# Patient Record
Sex: FEMALE | Race: WHITE | Employment: UNEMPLOYED | URBAN - NONMETROPOLITAN AREA
[De-identification: names, ages, dates, MRNs, and addresses within clinical notes are randomized per-mention and may not be internally consistent; named-entity substitution may affect disease eponyms.]

---

## 2017-01-30 ENCOUNTER — OFFICE VISIT (OUTPATIENT)
Dept: FAMILY MEDICINE CLINIC | Age: 22
End: 2017-01-30
Payer: COMMERCIAL

## 2017-01-30 VITALS
WEIGHT: 91 LBS | BODY MASS INDEX: 18.35 KG/M2 | TEMPERATURE: 98 F | SYSTOLIC BLOOD PRESSURE: 98 MMHG | OXYGEN SATURATION: 96 % | HEIGHT: 59 IN | HEART RATE: 82 BPM | DIASTOLIC BLOOD PRESSURE: 64 MMHG | RESPIRATION RATE: 16 BRPM

## 2017-01-30 DIAGNOSIS — J01.01 ACUTE RECURRENT MAXILLARY SINUSITIS: ICD-10-CM

## 2017-01-30 DIAGNOSIS — J01.01 ACUTE RECURRENT MAXILLARY SINUSITIS: Primary | ICD-10-CM

## 2017-01-30 LAB
ANION GAP SERPL CALCULATED.3IONS-SCNC: 17 MMOL/L (ref 7–19)
BUN BLDV-MCNC: 7 MG/DL (ref 6–20)
CALCIUM SERPL-MCNC: 9.1 MG/DL (ref 8.6–10)
CHLORIDE BLD-SCNC: 98 MMOL/L (ref 98–111)
CO2: 25 MMOL/L (ref 22–29)
CREAT SERPL-MCNC: 0.5 MG/DL (ref 0.5–0.9)
GFR NON-AFRICAN AMERICAN: >60
GLUCOSE BLD-MCNC: 84 MG/DL (ref 74–109)
HCT VFR BLD CALC: 43.6 % (ref 37–47)
HEMOGLOBIN: 13.6 G/DL (ref 12–16)
MCH RBC QN AUTO: 29.2 PG (ref 27–31)
MCHC RBC AUTO-ENTMCNC: 31.2 G/DL (ref 33–37)
MCV RBC AUTO: 93.6 FL (ref 81–99)
PDW BLD-RTO: 14.1 % (ref 11.5–14.5)
PLATELET # BLD: 244 K/UL (ref 130–400)
PMV BLD AUTO: 10.7 FL (ref 7.4–10.4)
POTASSIUM SERPL-SCNC: 4.3 MMOL/L (ref 3.5–5)
RBC # BLD: 4.66 M/UL (ref 4.2–5.4)
SODIUM BLD-SCNC: 140 MMOL/L (ref 136–145)
WBC # BLD: 6.6 K/UL (ref 4.8–10.8)

## 2017-01-30 PROCEDURE — 96372 THER/PROPH/DIAG INJ SC/IM: CPT | Performed by: FAMILY MEDICINE

## 2017-01-30 PROCEDURE — 99214 OFFICE O/P EST MOD 30 MIN: CPT | Performed by: FAMILY MEDICINE

## 2017-01-30 RX ORDER — CEPHALEXIN 500 MG/1
500 CAPSULE ORAL 3 TIMES DAILY
Qty: 30 CAPSULE | Refills: 0 | Status: SHIPPED | OUTPATIENT
Start: 2017-01-30 | End: 2022-03-01 | Stop reason: CLARIF

## 2017-01-30 RX ORDER — TRIAMCINOLONE ACETONIDE 40 MG/ML
40 INJECTION, SUSPENSION INTRA-ARTICULAR; INTRAMUSCULAR ONCE
Status: COMPLETED | OUTPATIENT
Start: 2017-01-30 | End: 2017-01-30

## 2017-01-30 RX ORDER — IPRATROPIUM BROMIDE 42 UG/1
2 SPRAY, METERED NASAL 3 TIMES DAILY
Qty: 1 BOTTLE | Refills: 3 | Status: SHIPPED | OUTPATIENT
Start: 2017-01-30 | End: 2022-03-01 | Stop reason: CLARIF

## 2017-01-30 RX ORDER — HYDROXYZINE HYDROCHLORIDE 25 MG/1
12.5 TABLET, FILM COATED ORAL EVERY 8 HOURS PRN
COMMUNITY
End: 2022-03-01 | Stop reason: CLARIF

## 2017-01-30 RX ADMIN — TRIAMCINOLONE ACETONIDE 40 MG: 40 INJECTION, SUSPENSION INTRA-ARTICULAR; INTRAMUSCULAR at 17:16

## 2017-01-30 ASSESSMENT — ENCOUNTER SYMPTOMS
GASTROINTESTINAL NEGATIVE: 1
ALLERGIC/IMMUNOLOGIC NEGATIVE: 1
EYES NEGATIVE: 1
SORE THROAT: 1
COUGH: 1

## 2017-02-10 ENCOUNTER — ANTI-COAG VISIT (OUTPATIENT)
Dept: FAMILY MEDICINE CLINIC | Age: 22
End: 2017-02-10

## 2021-11-29 ENCOUNTER — OFFICE VISIT (OUTPATIENT)
Dept: FAMILY MEDICINE CLINIC | Age: 26
End: 2021-11-29
Payer: MEDICAID

## 2021-11-29 VITALS
HEART RATE: 120 BPM | WEIGHT: 109 LBS | HEIGHT: 61 IN | OXYGEN SATURATION: 99 % | BODY MASS INDEX: 20.58 KG/M2 | TEMPERATURE: 98.4 F

## 2021-11-29 DIAGNOSIS — J06.9 VIRAL URI: ICD-10-CM

## 2021-11-29 DIAGNOSIS — J06.9 VIRAL URI: Primary | ICD-10-CM

## 2021-11-29 PROCEDURE — 99213 OFFICE O/P EST LOW 20 MIN: CPT | Performed by: FAMILY MEDICINE

## 2021-11-29 RX ORDER — AZITHROMYCIN 250 MG/1
TABLET, FILM COATED ORAL
Qty: 6 TABLET | Refills: 0 | Status: SHIPPED | OUTPATIENT
Start: 2021-11-29 | End: 2022-03-01 | Stop reason: CLARIF

## 2021-11-29 ASSESSMENT — PATIENT HEALTH QUESTIONNAIRE - PHQ9
SUM OF ALL RESPONSES TO PHQ9 QUESTIONS 1 & 2: 0
SUM OF ALL RESPONSES TO PHQ QUESTIONS 1-9: 0
2. FEELING DOWN, DEPRESSED OR HOPELESS: 0
SUM OF ALL RESPONSES TO PHQ QUESTIONS 1-9: 0
SUM OF ALL RESPONSES TO PHQ QUESTIONS 1-9: 0
1. LITTLE INTEREST OR PLEASURE IN DOING THINGS: 0

## 2021-11-29 ASSESSMENT — ENCOUNTER SYMPTOMS
GASTROINTESTINAL NEGATIVE: 1
ALLERGIC/IMMUNOLOGIC NEGATIVE: 1
RESPIRATORY NEGATIVE: 1
EYES NEGATIVE: 1

## 2021-11-29 NOTE — PROGRESS NOTES
SUBJECTIVE:    Patient ID: Noemy Seen is a 27 y. o.female. HPI:   Patient here for evaluation of a upper respiratory infection  Patient is a 68-year-old white female. She complains of a couple day history of cough congestion associated with fevers and wheezes. No nausea no vomiting. Mom and dad are sick with the same thing according to her. Fevers and chills. No loss of smell or taste. Past Medical History:   Diagnosis Date    Allergic rhinitis       Current Outpatient Medications   Medication Sig Dispense Refill    azithromycin (ZITHROMAX) 250 MG tablet 2 pills day 1 then 1 pill daily for day 2-5 6 tablet 0    ondansetron (ZOFRAN-ODT) 4 MG disintegrating tablet Take 4 mg by mouth every 8 hours as needed for Nausea or Vomiting      pantoprazole (PROTONIX) 40 MG tablet Take 40 mg by mouth daily      hydrOXYzine (ATARAX) 25 MG tablet Take 12.5 mg by mouth every 8 hours as needed for Itching or Anxiety (Patient not taking: Reported on 11/29/2021)      cephALEXin (KEFLEX) 500 MG capsule Take 1 capsule by mouth 3 times daily (Patient not taking: Reported on 11/29/2021) 30 capsule 0    ipratropium (ATROVENT) 0.06 % nasal spray 2 sprays by Nasal route 3 times daily 1 Bottle 3    LO LOESTRIN FE 1 MG-10 MCG / 10 MCG tablet       escitalopram (LEXAPRO) 10 MG tablet Take 1 tablet by mouth daily (Patient not taking: Reported on 11/29/2021) 30 tablet 5    hyoscyamine (ANASPAZ;LEVSIN) 0.125 MG tablet Take 1 tablet by mouth every 4 hours as needed for Cramping (Patient not taking: Reported on 11/29/2021) 180 tablet 3    colestipol (COLESTID) 1 G tablet Take 1 tablet by mouth 4 times daily (before meals and nightly) (Patient not taking: Reported on 11/29/2021) 120 tablet 5     No current facility-administered medications for this visit.       Allergies   Allergen Reactions    Latex     Bentyl [Dicyclomine Hcl] Other (See Comments)     Tachycardia    Paxil [Paroxetine Hcl] Other (See Comments) Suicidal and worsen depression, work very on panic attacks    Desyrel [Trazodone] Nausea And Vomiting    Penicillins Rash    Zoloft [Sertraline Hcl] Nausea And Vomiting       Review of Systems   Constitutional: Positive for fever. HENT: Positive for congestion. Eyes: Negative. Respiratory: Negative. Cardiovascular: Negative. Gastrointestinal: Negative. Endocrine: Negative. Genitourinary: Negative. Musculoskeletal: Negative. Skin: Negative. Allergic/Immunologic: Negative. Neurological: Negative. Hematological: Negative. Psychiatric/Behavioral: Negative. OBJECTIVE:    Physical Exam  Constitutional:       Appearance: Normal appearance. She is well-developed and well-groomed. HENT:      Head: Normocephalic and atraumatic. Right Ear: Tympanic membrane, ear canal and external ear normal. There is no impacted cerumen. Left Ear: Tympanic membrane, ear canal and external ear normal. There is no impacted cerumen. Nose: Nose normal.      Mouth/Throat:      Lips: Pink. Mouth: Mucous membranes are moist.      Dentition: Normal dentition. Pharynx: Oropharynx is clear. Uvula midline. Eyes:      General: Lids are normal.         Right eye: No discharge. Left eye: No discharge. Extraocular Movements: Extraocular movements intact. Conjunctiva/sclera: Conjunctivae normal.      Right eye: Right conjunctiva is not injected. Left eye: Left conjunctiva is not injected. Pupils: Pupils are equal, round, and reactive to light. Neck:      Thyroid: No thyromegaly. Vascular: No carotid bruit or JVD. Cardiovascular:      Rate and Rhythm: Normal rate and regular rhythm. Pulses: Normal pulses. Carotid pulses are 2+ on the right side and 2+ on the left side. Radial pulses are 2+ on the right side and 2+ on the left side.       Heart sounds: Normal heart sounds, S1 normal and S2 normal. No murmur heard.      Pulmonary:      Effort: Pulmonary effort is normal.      Breath sounds: Normal breath sounds. Chest:   Breasts:      Right: No supraclavicular adenopathy. Left: No supraclavicular adenopathy. Abdominal:      General: Bowel sounds are normal. There is no distension or abdominal bruit. Palpations: Abdomen is soft. There is no mass. Hernia: No hernia is present. Musculoskeletal:         General: Normal range of motion. Cervical back: Full passive range of motion without pain, normal range of motion and neck supple. Right lower leg: No edema. Left lower leg: No edema. Lymphadenopathy:      Cervical: No cervical adenopathy. Right cervical: No superficial cervical adenopathy. Left cervical: No superficial cervical adenopathy. Upper Body:      Right upper body: No supraclavicular adenopathy. Left upper body: No supraclavicular adenopathy. Skin:     General: Skin is warm and dry. Coloration: Skin is not pale. Findings: No lesion or rash. Nails: There is no clubbing. Neurological:      Mental Status: She is alert and oriented to person, place, and time. Motor: No weakness or tremor. Coordination: Coordination normal.      Deep Tendon Reflexes: Reflexes are normal and symmetric. Psychiatric:         Attention and Perception: Attention normal.         Mood and Affect: Mood normal.         Speech: Speech normal.         Behavior: Behavior normal. Behavior is cooperative. Thought Content: Thought content normal.         Cognition and Memory: Cognition and memory normal.         Judgment: Judgment normal.        Pulse 120   Temp 98.4 °F (36.9 °C) (Temporal)   Ht 5' 1\" (1.549 m)   Wt 109 lb (49.4 kg)   SpO2 99%   BMI 20.60 kg/m²      ASSESSMENT:     Diagnosis Orders   1. Viral URI  Miscellaneous Sendout 1    azithromycin (ZITHROMAX) 250 MG tablet        PLAN:    1.  Z-Diogenes. Respiratory DNA.   Follow-up as needed

## 2021-11-30 LAB
ADENOVIRUS BY PCR: NOT DETECTED
BORDETELLA PARAPERTUSSIS BY PCR: NOT DETECTED
BORDETELLA PERTUSSIS BY PCR: NOT DETECTED
CHLAMYDOPHILIA PNEUMONIAE BY PCR: NOT DETECTED
CORONAVIRUS 229E BY PCR: NOT DETECTED
CORONAVIRUS HKU1 BY PCR: NOT DETECTED
CORONAVIRUS NL63 BY PCR: NOT DETECTED
CORONAVIRUS OC43 BY PCR: NOT DETECTED
HUMAN METAPNEUMOVIRUS BY PCR: DETECTED
HUMAN RHINOVIRUS/ENTEROVIRUS BY PCR: NOT DETECTED
INFLUENZA A BY PCR: NOT DETECTED
INFLUENZA B BY PCR: NOT DETECTED
MYCOPLASMA PNEUMONIAE BY PCR: NOT DETECTED
PARAINFLUENZA VIRUS 1 BY PCR: NOT DETECTED
PARAINFLUENZA VIRUS 2 BY PCR: NOT DETECTED
PARAINFLUENZA VIRUS 3 BY PCR: NOT DETECTED
PARAINFLUENZA VIRUS 4 BY PCR: NOT DETECTED
RESPIRATORY SYNCYTIAL VIRUS BY PCR: NOT DETECTED
SARS-COV-2, PCR: NOT DETECTED

## 2022-03-01 ENCOUNTER — OFFICE VISIT (OUTPATIENT)
Dept: FAMILY MEDICINE CLINIC | Age: 27
End: 2022-03-01
Payer: MEDICAID

## 2022-03-01 VITALS
OXYGEN SATURATION: 99 % | TEMPERATURE: 98.2 F | BODY MASS INDEX: 19.84 KG/M2 | DIASTOLIC BLOOD PRESSURE: 62 MMHG | HEART RATE: 109 BPM | WEIGHT: 105 LBS | SYSTOLIC BLOOD PRESSURE: 112 MMHG

## 2022-03-01 DIAGNOSIS — R00.0 TACHYCARDIA: ICD-10-CM

## 2022-03-01 DIAGNOSIS — G93.5 CHIARI I MALFORMATION (HCC): ICD-10-CM

## 2022-03-01 DIAGNOSIS — N30.00 ACUTE CYSTITIS WITHOUT HEMATURIA: Primary | ICD-10-CM

## 2022-03-01 DIAGNOSIS — K58.9 IRRITABLE BOWEL SYNDROME, UNSPECIFIED TYPE: ICD-10-CM

## 2022-03-01 LAB
ALBUMIN SERPL-MCNC: 5.1 G/DL (ref 3.5–5.2)
ALP BLD-CCNC: 62 U/L (ref 35–104)
ALT SERPL-CCNC: <5 U/L (ref 5–33)
ANION GAP SERPL CALCULATED.3IONS-SCNC: 16 MMOL/L (ref 7–19)
AST SERPL-CCNC: 14 U/L (ref 5–32)
BILIRUB SERPL-MCNC: 0.5 MG/DL (ref 0.2–1.2)
BILIRUBIN URINE: NEGATIVE
BLOOD, URINE: NEGATIVE
BUN BLDV-MCNC: 10 MG/DL (ref 6–20)
CALCIUM SERPL-MCNC: 9.6 MG/DL (ref 8.6–10)
CHLORIDE BLD-SCNC: 106 MMOL/L (ref 98–111)
CLARITY: CLEAR
CO2: 18 MMOL/L (ref 22–29)
COLOR: YELLOW
CREAT SERPL-MCNC: 0.6 MG/DL (ref 0.5–0.9)
GFR AFRICAN AMERICAN: >59
GFR NON-AFRICAN AMERICAN: >60
GLUCOSE BLD-MCNC: 95 MG/DL (ref 74–109)
GLUCOSE URINE: NEGATIVE MG/DL
HCT VFR BLD CALC: 45.9 % (ref 37–47)
HEMOGLOBIN: 14.3 G/DL (ref 12–16)
KETONES, URINE: NEGATIVE MG/DL
LEUKOCYTE ESTERASE, URINE: ABNORMAL
MCH RBC QN AUTO: 31.4 PG (ref 27–31)
MCHC RBC AUTO-ENTMCNC: 31.2 G/DL (ref 33–37)
MCV RBC AUTO: 100.7 FL (ref 81–99)
NITRITE, URINE: NEGATIVE
PDW BLD-RTO: 12.8 % (ref 11.5–14.5)
PH UA: 7.5 (ref 5–8)
PLATELET # BLD: 220 K/UL (ref 130–400)
PMV BLD AUTO: 10.1 FL (ref 9.4–12.3)
POTASSIUM SERPL-SCNC: 4.2 MMOL/L (ref 3.5–5)
PROTEIN UA: NEGATIVE MG/DL
RBC # BLD: 4.56 M/UL (ref 4.2–5.4)
SODIUM BLD-SCNC: 140 MMOL/L (ref 136–145)
SPECIFIC GRAVITY UA: 1.01 (ref 1–1.03)
TOTAL PROTEIN: 7.2 G/DL (ref 6.6–8.7)
TSH SERPL DL<=0.05 MIU/L-ACNC: 2.43 UIU/ML (ref 0.27–4.2)
URINE REFLEX TO CULTURE: YES
URINE TYPE: ABNORMAL
UROBILINOGEN, URINE: 0.2 E.U./DL
WBC # BLD: 6.7 K/UL (ref 4.8–10.8)

## 2022-03-01 PROCEDURE — 81003 URINALYSIS AUTO W/O SCOPE: CPT | Performed by: FAMILY MEDICINE

## 2022-03-01 PROCEDURE — 99214 OFFICE O/P EST MOD 30 MIN: CPT | Performed by: FAMILY MEDICINE

## 2022-03-01 RX ORDER — QUETIAPINE FUMARATE 100 MG/1
TABLET, FILM COATED ORAL
COMMUNITY
Start: 2021-12-22 | End: 2022-04-04

## 2022-03-01 RX ORDER — ALPRAZOLAM 1 MG/1
1 TABLET ORAL 3 TIMES DAILY PRN
COMMUNITY
Start: 2021-10-28

## 2022-03-01 RX ORDER — LEVOFLOXACIN 500 MG/1
500 TABLET, FILM COATED ORAL DAILY
Qty: 7 TABLET | Refills: 0 | Status: SHIPPED | OUTPATIENT
Start: 2022-03-01 | End: 2022-03-08

## 2022-03-01 ASSESSMENT — ENCOUNTER SYMPTOMS
RESPIRATORY NEGATIVE: 1
ALLERGIC/IMMUNOLOGIC NEGATIVE: 1
EYES NEGATIVE: 1
ABDOMINAL PAIN: 1

## 2022-03-01 NOTE — PROGRESS NOTES
SUBJECTIVE:    Patient ID: Madeline Hale is a 27 y. o.female. HPI:   Here for follow-up of multiple medical problems. Patient is a 79-year-old white female. She complains of a 2-week history of dysuria frequency with some mild lower back pain. No nausea no vomiting. No fevers no chills. She has history of UTIs. She also would like to be referred to a cardiologist.  She was seen in cardiologist for tachycardia in [facility]. She was given a beta-blocker that she was not able to tolerate. Etiology of her tachycardia was never determined. She also have IBS. She was seen the GI doctor in [place]. She needs to be referral to GI locally. She also have a mild Chiari malformation. She was seen neurosurgery in [place]. Past Medical History:   Diagnosis Date    Allergic rhinitis       Current Outpatient Medications   Medication Sig Dispense Refill    QUEtiapine (SEROQUEL) 100 MG tablet       ALPRAZolam (XANAX) 1 MG tablet Take 1 mg by mouth 3 times daily as needed.  levoFLOXacin (LEVAQUIN) 500 MG tablet Take 1 tablet by mouth daily for 7 days 7 tablet 0     No current facility-administered medications for this visit. Allergies   Allergen Reactions    Latex     Bentyl [Dicyclomine Hcl] Other (See Comments)     Tachycardia    Paxil [Paroxetine Hcl] Other (See Comments)     Suicidal and worsen depression, work very on panic attacks    Desyrel [Trazodone] Nausea And Vomiting    Penicillins Rash    Zoloft [Sertraline Hcl] Nausea And Vomiting       Review of Systems   Constitutional: Negative. HENT: Negative. Eyes: Negative. Respiratory: Negative. Cardiovascular: Positive for palpitations. Gastrointestinal: Positive for abdominal pain. Endocrine: Negative. Genitourinary: Positive for dysuria. Musculoskeletal: Negative. Skin: Negative. Allergic/Immunologic: Negative. Neurological: Negative. Hematological: Negative.     Psychiatric/Behavioral: Negative. OBJECTIVE:    Physical Exam   /62 (Site: Left Upper Arm, Position: Sitting, Cuff Size: Medium Adult)   Pulse 109   Temp 98.2 °F (36.8 °C) (Temporal)   Wt 105 lb (47.6 kg)   SpO2 99%   BMI 19.84 kg/m²      ASSESSMENT:     Diagnosis Orders   1. Acute cystitis without hematuria  Urinalysis with Reflex to Culture    levoFLOXacin (LEVAQUIN) 500 MG tablet   2. Tachycardia-etiology unknown Alyssa Jacobs MD, Cardiology, [place]    CBC    Comprehensive Metabolic Panel    TSH   3. Irritable bowel syndrome, unspecified type-ongoing Fernanda Morrison MD, Gastroenterology, [place]   4. Chiari I malformation (HCC)-needs follow-up Paulina Lopez DO, Neurosurgery, [place]        PLAN:    1. Urine with reflex to culture. Trial of Levaquin. 2.  Refer to cardiology. Blood work. Zio patch  3.  Refer to GI  4.   Refer to neurosurgery  Follow-up after seen by specialist.

## 2022-03-02 LAB
BACTERIA: NEGATIVE /HPF
CRYSTALS, UA: ABNORMAL /HPF
EPITHELIAL CELLS, UA: 2 /HPF (ref 0–5)
HYALINE CASTS: 0 /HPF (ref 0–8)
RBC UA: 0 /HPF (ref 0–4)
WBC UA: 6 /HPF (ref 0–5)

## 2022-03-04 ENCOUNTER — TELEPHONE (OUTPATIENT)
Dept: NEUROSURGERY | Age: 27
End: 2022-03-04

## 2022-03-04 LAB — URINE CULTURE, ROUTINE: NORMAL

## 2022-03-04 NOTE — TELEPHONE ENCOUNTER
1st attempt to call patient to schedule appointment, left voicemail with call back number [phone]

## 2022-03-07 ENCOUNTER — TELEPHONE (OUTPATIENT)
Dept: NEUROSURGERY | Age: 27
End: 2022-03-07

## 2022-03-07 NOTE — TELEPHONE ENCOUNTER
2nd attempt to call patient to schedule appointment left voicemail with call back number [phone]

## 2022-03-08 DIAGNOSIS — N30.00 ACUTE CYSTITIS WITHOUT HEMATURIA: Primary | ICD-10-CM

## 2022-03-10 ENCOUNTER — TELEPHONE (OUTPATIENT)
Dept: NEUROSURGERY | Age: 27
End: 2022-03-10

## 2022-03-10 NOTE — TELEPHONE ENCOUNTER
3rd attempt to call patient to schedule appointment, left voicemail with call back number [phone]

## 2022-03-14 ENCOUNTER — HOSPITAL ENCOUNTER (OUTPATIENT)
Dept: NON INVASIVE DIAGNOSTICS | Age: 27
Discharge: HOME | End: 2022-03-14
Payer: MEDICAID

## 2022-03-14 DIAGNOSIS — R00.0 TACHYCARDIA: ICD-10-CM

## 2022-03-14 PROCEDURE — 93246 EXT ECG>7D<15D RECORDING: CPT

## 2022-03-28 ENCOUNTER — OFFICE VISIT (OUTPATIENT)
Dept: FAMILY MEDICINE CLINIC | Age: 27
End: 2022-03-28
Payer: MEDICAID

## 2022-03-28 ENCOUNTER — NURSE TRIAGE (OUTPATIENT)
Dept: OTHER | Facility: CLINIC | Age: 27
End: 2022-03-28

## 2022-03-28 VITALS
TEMPERATURE: 98.1 F | OXYGEN SATURATION: 99 % | DIASTOLIC BLOOD PRESSURE: 64 MMHG | HEART RATE: 116 BPM | RESPIRATION RATE: 20 BRPM | SYSTOLIC BLOOD PRESSURE: 100 MMHG | WEIGHT: 101 LBS | BODY MASS INDEX: 19.08 KG/M2

## 2022-03-28 DIAGNOSIS — R53.83 OTHER FATIGUE: ICD-10-CM

## 2022-03-28 DIAGNOSIS — R74.8 ABNORMAL AST AND ALT: Primary | ICD-10-CM

## 2022-03-28 DIAGNOSIS — R79.89 ABNORMAL CBC: ICD-10-CM

## 2022-03-28 DIAGNOSIS — R74.8 ABNORMAL AST AND ALT: ICD-10-CM

## 2022-03-28 LAB
ALBUMIN SERPL-MCNC: 4.9 G/DL (ref 3.5–5.2)
ALP BLD-CCNC: 65 U/L (ref 35–104)
ALT SERPL-CCNC: <5 U/L (ref 5–33)
AST SERPL-CCNC: 11 U/L (ref 5–32)
BASOPHILS ABSOLUTE: 0 K/UL (ref 0–0.2)
BASOPHILS RELATIVE PERCENT: 0.5 % (ref 0–1)
BILIRUB SERPL-MCNC: 0.4 MG/DL (ref 0.2–1.2)
BILIRUBIN DIRECT: 0.1 MG/DL (ref 0–0.3)
BILIRUBIN, INDIRECT: 0.3 MG/DL (ref 0.1–1)
C-REACTIVE PROTEIN: <0.3 MG/DL (ref 0–0.5)
EOSINOPHILS ABSOLUTE: 0 K/UL (ref 0–0.6)
EOSINOPHILS RELATIVE PERCENT: 0.7 % (ref 0–5)
FERRITIN: 46.9 NG/ML (ref 13–150)
HCT VFR BLD CALC: 45 % (ref 37–47)
HEMOGLOBIN: 14.2 G/DL (ref 12–16)
IMMATURE GRANULOCYTES #: 0 K/UL
IRON SATURATION: 20 % (ref 14–50)
IRON: 60 UG/DL (ref 37–145)
LYMPHOCYTES ABSOLUTE: 2.1 K/UL (ref 1.1–4.5)
LYMPHOCYTES RELATIVE PERCENT: 37.4 % (ref 20–40)
MCH RBC QN AUTO: 31 PG (ref 27–31)
MCHC RBC AUTO-ENTMCNC: 31.6 G/DL (ref 33–37)
MCV RBC AUTO: 98.3 FL (ref 81–99)
MONOCYTES ABSOLUTE: 0.5 K/UL (ref 0–0.9)
MONOCYTES RELATIVE PERCENT: 9 % (ref 0–10)
NEUTROPHILS ABSOLUTE: 2.9 K/UL (ref 1.5–7.5)
NEUTROPHILS RELATIVE PERCENT: 52 % (ref 50–65)
PDW BLD-RTO: 12.8 % (ref 11.5–14.5)
PLATELET # BLD: 292 K/UL (ref 130–400)
PMV BLD AUTO: 10 FL (ref 9.4–12.3)
RBC # BLD: 4.58 M/UL (ref 4.2–5.4)
SEDIMENTATION RATE, ERYTHROCYTE: 6 MM/HR (ref 0–20)
TOTAL IRON BINDING CAPACITY: 295 UG/DL (ref 250–400)
TOTAL PROTEIN: 7 G/DL (ref 6.6–8.7)
WBC # BLD: 5.6 K/UL (ref 4.8–10.8)

## 2022-03-28 PROCEDURE — 99214 OFFICE O/P EST MOD 30 MIN: CPT | Performed by: NURSE PRACTITIONER

## 2022-03-28 SDOH — ECONOMIC STABILITY: FOOD INSECURITY: WITHIN THE PAST 12 MONTHS, THE FOOD YOU BOUGHT JUST DIDN'T LAST AND YOU DIDN'T HAVE MONEY TO GET MORE.: NEVER TRUE

## 2022-03-28 SDOH — ECONOMIC STABILITY: FOOD INSECURITY: WITHIN THE PAST 12 MONTHS, YOU WORRIED THAT YOUR FOOD WOULD RUN OUT BEFORE YOU GOT MONEY TO BUY MORE.: NEVER TRUE

## 2022-03-28 ASSESSMENT — ENCOUNTER SYMPTOMS
BLOOD IN STOOL: 1
SHORTNESS OF BREATH: 0
COUGH: 0
NAUSEA: 1
VOMITING: 0
SORE THROAT: 0

## 2022-03-28 ASSESSMENT — SOCIAL DETERMINANTS OF HEALTH (SDOH): HOW HARD IS IT FOR YOU TO PAY FOR THE VERY BASICS LIKE FOOD, HOUSING, MEDICAL CARE, AND HEATING?: NOT HARD AT ALL

## 2022-03-28 NOTE — TELEPHONE ENCOUNTER
Received call from [facility] with Red Flag Complaint. Subjective: Caller states \"I've had a migraine for a few days. I have really bad body pain. \"     Hx of Migraines-\"body pain is not this bad usually\"    Current Symptoms: headache, extreme fatigue, body aches, slight nausea    Onset: a few days ago; worsening    Associated Symptoms: reduced activity    Pain Severity: 7/10 body aches, 6/10 headache; aching; constant    Temperature: Denies feeling feverish    What has been tried: Tramadol, Tylenol-NOT helping    LMP: IUD Pregnant: No    Recommended disposition: Go to Office Now. Writer advised patient to be seen at [facility] if unable to get appointment in office in the next four hours. Patient agreeable. Care advice provided, patient verbalizes understanding; denies any other questions or concerns; instructed to call back for any new or worsening symptoms. Patient/Caller agrees with recommended disposition; writer provided warm transfer to [place] [facility] [employer] for appointment scheduling. Attention Provider: Thank you for allowing me to participate in the care of your patient. The patient was connected to triage in response to information provided to the [facility]. Please do not respond through this encounter as the response is not directed to a shared pool.       Reason for Disposition   SEVERE pain (e.g., excruciating, unable to do any normal activities) and not improved 2 hours after pain medicine    Protocols used: MUSCLE ACHES AND BODY PAIN-ADULT-[place]

## 2022-03-28 NOTE — PROGRESS NOTES
SUBJECTIVE:  Headache , Fatigue and body aches   Patient ID: Erlin Griffin is a 27 y.o. female. HPI:   HPI   So reviewed with mother number to call for Neuro. She has correct number. Started a few days ago. Felt tired ,   Has had diarrhea for 2 months. Goes 30 minutes after eating. 3-4 times a day. Doesn't wake in the middle of the night. States bleeding from rectum. Has an appointment with GI 15 th of April   It is from fissures. No hard Bm. Has hemorrhoids. Will alternate between improving and getting bad. Red in color no clots. No fever. Has some stomach pain. Sometimes stabbing and sometimes achy. With Nausea. Drink ETOH Wine once a month. Eating only 1-2 meals. Fatigue for 3 days. sleeping all the time. No sore throat. Works at [employer]. Past Medical History:   Diagnosis Date    Allergic rhinitis       Prior to Visit Medications    Medication Sig Taking? Authorizing Provider   QUEtiapine (SEROQUEL) 100 MG tablet  Yes Historical Provider, MD   ALPRAZolam (XANAX) 1 MG tablet Take 1 mg by mouth 3 times daily as needed. Yes Historical Provider, MD      Allergies   Allergen Reactions    Latex     Bentyl [Dicyclomine Hcl] Other (See Comments)     Tachycardia    Paxil [Paroxetine Hcl] Other (See Comments)     Suicidal and worsen depression, work very on panic attacks    Desyrel [Trazodone] Nausea And Vomiting    Penicillins Rash    Zoloft [Sertraline Hcl] Nausea And Vomiting       Review of Systems   Constitutional: Positive for fatigue. Negative for fever. HENT: Negative for congestion and sore throat. Respiratory: Negative for cough and shortness of breath. Cardiovascular: Negative for chest pain and leg swelling. Gastrointestinal: Positive for blood in stool and nausea. Negative for vomiting. Genitourinary: Negative for difficulty urinating, hematuria and menstrual problem. Vaginal discharge: all over    Musculoskeletal: Positive for arthralgias and myalgias. Neurological: Positive for dizziness and headaches. Psychiatric/Behavioral: Positive for sleep disturbance. Negative for confusion and dysphoric mood. The patient is not nervous/anxious. OBJECTIVE:    Physical Exam  Constitutional:       Appearance: Normal appearance. She is well-developed and well-groomed. HENT:      Head: Normocephalic and atraumatic. Right Ear: Tympanic membrane, ear canal and external ear normal. There is no impacted cerumen. Left Ear: Tympanic membrane, ear canal and external ear normal. There is no impacted cerumen. Nose: Nose normal.      Mouth/Throat:      Lips: Pink. Mouth: Mucous membranes are moist.      Dentition: Normal dentition. Pharynx: Oropharynx is clear. Uvula midline. Eyes:      General: Lids are normal.         Right eye: No discharge. Left eye: No discharge. Extraocular Movements: Extraocular movements intact. Conjunctiva/sclera: Conjunctivae normal.      Right eye: Right conjunctiva is not injected. Left eye: Left conjunctiva is not injected. Pupils: Pupils are equal, round, and reactive to light. Neck:      Thyroid: No thyromegaly. Vascular: No carotid bruit or JVD. Cardiovascular:      Rate and Rhythm: Normal rate and regular rhythm. Pulses: Normal pulses. Carotid pulses are 2+ on the right side and 2+ on the left side. Radial pulses are 2+ on the right side and 2+ on the left side. Heart sounds: Normal heart sounds, S1 normal and S2 normal. No murmur heard. Pulmonary:      Effort: Pulmonary effort is normal.      Breath sounds: Normal breath sounds. Chest:   Breasts:      Right: No supraclavicular adenopathy. Left: No supraclavicular adenopathy. Abdominal:      General: Bowel sounds are normal. There is no distension or abdominal bruit. Palpations: Abdomen is soft. There is no mass. Hernia: No hernia is present.    Musculoskeletal: General: Normal range of motion. Cervical back: Full passive range of motion without pain, normal range of motion and neck supple. Right lower leg: No edema. Left lower leg: No edema. Lymphadenopathy:      Cervical: No cervical adenopathy. Right cervical: No superficial cervical adenopathy. Left cervical: No superficial cervical adenopathy. Upper Body:      Right upper body: No supraclavicular adenopathy. Left upper body: No supraclavicular adenopathy. Skin:     General: Skin is warm and dry. Coloration: Skin is not pale. Findings: No lesion or rash. Nails: There is no clubbing. Neurological:      Mental Status: She is alert and oriented to person, place, and time. Motor: No weakness or tremor. Coordination: Coordination normal.      Deep Tendon Reflexes: Reflexes are normal and symmetric. Psychiatric:         Attention and Perception: Attention normal.         Mood and Affect: Mood normal.         Speech: Speech normal.         Behavior: Behavior normal. Behavior is cooperative. Thought Content: Thought content normal.         Cognition and Memory: Cognition and memory normal.         Judgment: Judgment normal.        /64 (Site: Left Upper Arm, Position: Sitting, Cuff Size: Medium Adult)   Pulse 116   Temp 98.1 °F (36.7 °C) (Temporal)   Resp 20   Wt 101 lb (45.8 kg)   SpO2 99%   BMI 19.08 kg/m²      ASSESSMENT:      ICD-10-CM    1. Abnormal AST and ALT  R74.8 Hepatic Function Panel   2. Abnormal CBC  R79.89 Ferritin     Iron and TIBC     CBC with Auto Differential   3.  Other fatigue  R53.83 Sedimentation Rate     C-Reactive Protein     SHANDA Screen with Reflex       PLAN:    Tennie Showers: Migraine (has had a migraine with body aches and fatigue for the last few days ), Fatigue (fatigue ), and Generalized Body Aches (has taken Tramadol, Tylenol and Gar Patricia nothing is helping )  Has an appointment with GI in April   Mother Has called Neurology back no answer left message   Will see cardiology     Diagnosis and orders for this visit are above.

## 2022-03-28 NOTE — LETTER
[facility]  [address] [address]  Phone: [phone]  Fax: [phone]    GENARO Saini        March 28, 2022     Patient: Jesus Peterson   YOB: 1995   Date of Visit: 3/28/2022       To Whom It May Concern: It is my medical opinion that Jesus Peterson may return to work on 3/29/2022. If you have any questions or concerns, please don't hesitate to call.     Sincerely,        GENARO Saini

## 2022-03-29 ENCOUNTER — TELEPHONE (OUTPATIENT)
Dept: NEUROSURGERY | Age: 27
End: 2022-03-29

## 2022-03-29 ENCOUNTER — TELEPHONE (OUTPATIENT)
Dept: NEUROLOGY | Age: 27
End: 2022-03-29

## 2022-03-29 NOTE — TELEPHONE ENCOUNTER
Spoke with Blossom Stout at [facility] to request MRI Brain Imaging, and was informed to fax a medical records request to [phone]. I faxed request to obtain MRI Brain from 4/13/2021 and to also obtain X-ray Lumbar Spine and X-ray Thoracic (10-26-21).

## 2022-03-29 NOTE — TELEPHONE ENCOUNTER
[facility] New Patient Questionnaire    1. Diagnosis/Reason for Referral?    Chiari malformation     2. Who is completing questionnaire? Patient X Caregiver Family      3. Has the patient had any previous spinal/brain surgeries? NO        A. If yes, what is the name of the facility in which the surgery was performed? B. Procedure/Surgery performed? C. Who was the surgeon? D. When was the surgery? MM/YY       E. Did the patient improve after the surgery? 4. Is this a second opinion? If yes, Dr. Heather Hooks would like to review patient first before making the appointment. 5. Have MRI Images been obtain within the last year? Yes  No X      XR  CT     If yes, where was the imaging performed? If yes, what part of the body? Lumbar  Cervical  Thoracic  Brain     If yes, when was it obtained? MM/YY    Note: if the scan was performed at a facility other than [facility], the disc will need to be brought to the appointment or we need to reach out to obtain the disc. A. Was the patient instructed to provide the disc? Yes   No X      8. Has the patient had a NCV/EMG within the last year? Yes  No X     If yes, where was it performed and date? MM/YY  Location:      9. Has the patient been to Physical Therapy? Yes  No X     If yes, what location, how long attended, and last visit? Location:        Therapy Lasted:    Date of Last Visit:      10. Has the patient been to Pain Management? Yes  No X     If yes, what location and last visit     Location:   Last Visit:   Is it helping?

## 2022-03-31 LAB — ANA IGG, ELISA: NORMAL

## 2022-04-04 ENCOUNTER — OFFICE VISIT (OUTPATIENT)
Dept: UROLOGY | Age: 27
End: 2022-04-04
Payer: MEDICAID

## 2022-04-04 VITALS — WEIGHT: 103.4 LBS | BODY MASS INDEX: 19.52 KG/M2 | HEIGHT: 61 IN | TEMPERATURE: 98.1 F

## 2022-04-04 DIAGNOSIS — Z87.442 HISTORY OF KIDNEY STONES: ICD-10-CM

## 2022-04-04 DIAGNOSIS — R30.0 DYSURIA: ICD-10-CM

## 2022-04-04 DIAGNOSIS — N39.0 RECURRENT UTI: Primary | ICD-10-CM

## 2022-04-04 PROCEDURE — 99204 OFFICE O/P NEW MOD 45 MIN: CPT | Performed by: NURSE PRACTITIONER

## 2022-04-04 PROCEDURE — 51798 US URINE CAPACITY MEASURE: CPT | Performed by: NURSE PRACTITIONER

## 2022-04-04 RX ORDER — PANTOPRAZOLE SODIUM 40 MG/1
40 TABLET, DELAYED RELEASE ORAL DAILY
COMMUNITY
End: 2022-08-26 | Stop reason: SDUPTHER

## 2022-04-04 RX ORDER — AMITRIPTYLINE HYDROCHLORIDE 10 MG/1
10 TABLET, FILM COATED ORAL NIGHTLY
Qty: 14 TABLET | Refills: 0 | Status: SHIPPED | OUTPATIENT
Start: 2022-04-04 | End: 2022-05-04 | Stop reason: ALTCHOICE

## 2022-04-04 RX ORDER — TRAMADOL HYDROCHLORIDE 50 MG/1
50 TABLET ORAL EVERY 6 HOURS PRN
COMMUNITY
Start: 2022-01-07 | End: 2022-08-26 | Stop reason: SDUPTHER

## 2022-04-04 RX ORDER — TOPIRAMATE 50 MG/1
TABLET, FILM COATED ORAL
COMMUNITY
Start: 2022-01-07

## 2022-04-04 RX ORDER — PHENAZOPYRIDINE HYDROCHLORIDE 100 MG/1
100 TABLET, FILM COATED ORAL 3 TIMES DAILY PRN
Qty: 30 TABLET | Refills: 0 | Status: SHIPPED | OUTPATIENT
Start: 2022-04-04 | End: 2022-05-04 | Stop reason: ALTCHOICE

## 2022-04-04 RX ORDER — AMITRIPTYLINE HYDROCHLORIDE 25 MG/1
25 TABLET, FILM COATED ORAL NIGHTLY
Qty: 30 TABLET | Refills: 0 | Status: SHIPPED | OUTPATIENT
Start: 2022-04-04 | End: 2022-08-26

## 2022-04-04 RX ORDER — QUETIAPINE FUMARATE 25 MG/1
25 TABLET, FILM COATED ORAL NIGHTLY
COMMUNITY
Start: 2022-03-01

## 2022-04-04 NOTE — PROGRESS NOTES
Sebastien Santiago is a 27 y.o., female, New patient who presents today   Chief Complaint   Patient presents with    New Patient     I am here today for recurrent UTI        HPI   Patient presents with complaints of lower urinary tract symptoms including dysuria, pain in her lower abdomen and vaginal area, lower back pain. She reports these have been recurring intermittently since she was 17years old. She believes these are secondary to recurrent urinary tract infections. She denies any fevers, but sometimes can experience some chills and nausea if her symptoms are severe enough. She has recently moved here and her previous primary care provider only sent 1+ culture for review. This was in February 2020 and grew out Enterococcus. She reports over the past few years, chronically she has been experiencing some urgency without frequency. She denies any trouble emptying her bladder. She does note some triggers for her symptoms including tampons, intercourse, after her period. She also notes dietary triggers such as soda and sugar. She has utilized Azo in the past which has been helpful for her symptoms. She reports recently having her first stone episode about 2 to 3 weeks ago. She was able to pass this on her own. She was evaluated at that time for her urinary symptoms, however, her culture was negative as her symptoms were likely secondary to the stone passage. She is on Topamax and has been on the medication for about 8 years. REVIEW OF SYSTEMS:  Review of Systems   Constitutional: Negative for chills and fever. Gastrointestinal: Positive for abdominal pain (Suprapubic). Negative for abdominal distention, nausea and vomiting. Genitourinary: Positive for dysuria and urgency. Negative for difficulty urinating, flank pain, frequency and hematuria. Musculoskeletal: Negative for back pain and gait problem. Psychiatric/Behavioral: Negative for agitation and confusion.         PHYSICAL EXAM:  Temp 98.1 °F (36.7 °C) (Temporal)   Ht 5' 1\" (1.549 m)   Wt 103 lb 6.4 oz (46.9 kg)   BMI 19.54 kg/m²   Physical Exam  Vitals and nursing note reviewed. Constitutional:       General: She is not in acute distress. Appearance: Normal appearance. She is not ill-appearing. Pulmonary:      Effort: Pulmonary effort is normal. No respiratory distress. Abdominal:      General: There is no distension. Tenderness: There is no abdominal tenderness. There is no right CVA tenderness or left CVA tenderness. Neurological:      Mental Status: She is alert and oriented to person, place, and time. Mental status is at baseline. Psychiatric:         Mood and Affect: Mood normal.         Behavior: Behavior normal.         ASSESSMENT/PLAN  1. Recurrent UTI  Patient presents with complaints of recurrent urinary tract infections, however, I am uncertain these are actually secondary to bacterial infection versus chronic cystitis or interstitial cystitis. The patient, her mother, and I discussed the difference between these conditions and the treatment moving forward. She has elected to move forward with Elavil as well as as needed Pyridium. We will reevaluate in a few weeks and if she is doing well on the current dosage, we will try to increased to 50 mg. If this is not helpful, we can look into other therapies. - [place] Measure, post-void residual, US, non-imaging  - amitriptyline (ELAVIL) 10 MG tablet; Take 1 tablet by mouth nightly  Dispense: 14 tablet; Refill: 0  - amitriptyline (ELAVIL) 25 MG tablet; Take 1 tablet by mouth nightly  Dispense: 30 tablet; Refill: 0  - phenazopyridine (PYRIDIUM) 100 MG tablet; Take 1 tablet by mouth 3 times daily as needed for Pain  Dispense: 30 tablet; Refill: [facility], [place]    2. Dysuria  Not likely secondary to bacterial source. Will treat as of interstitial cystitis. - AZ Measure, post-void residual, US, non-imaging  - amitriptyline (ELAVIL) 10 MG tablet;  Take 1 tablet by mouth nightly  Dispense: 14 tablet; Refill: 0  - phenazopyridine (PYRIDIUM) 100 MG tablet; Take 1 tablet by mouth 3 times daily as needed for Pain  Dispense: 30 tablet; Refill: 0    3. History of kidney stones  Recent history of stones. No imaging. Will evaluate with KUB at next appointment. - XR ABDOMEN (KUB) (SINGLE AP VIEW); Future      Orders Placed This Encounter   Procedures    XR ABDOMEN (KUB) (SINGLE AP VIEW)     Standing Status:   Future     Standing Expiration Date:   4/4/2023   [facility], [place]     Referral Priority:   Routine     Referral Type:   Eval and Treat     Referral Reason:   Specialty Services Required     Requested Specialty:   Obstetrics & Gynecology     Number of Visits Requested:   1    [place] Measure, post-void residual, US, non-imaging        Return in about 6 weeks (around 5/16/2022) for KUB prior. An electronic signature was used to authenticate this note. TYESHA LADD, APRN - CNP    All information inputted into the note by the MA to include chief complaint, past medical history, past surgical history, medications, allergies, social and family history and review of systems has been reviewed and updated as needed by me. EMR Dragon/transcription disclaimer: Much of this document is electronic transcription/translation of spoken language to printed text. The electronic translation of spoken language may be erroneous or, at times, nonsensical words or phrases may be inadvertently transcribed.  Although I have reviewed the document for such errors, some may still exist.

## 2022-04-05 ASSESSMENT — ENCOUNTER SYMPTOMS
BACK PAIN: 0
ABDOMINAL DISTENTION: 0
VOMITING: 0
NAUSEA: 0
ABDOMINAL PAIN: 1

## 2022-04-06 PROCEDURE — 93248 EXT ECG>7D<15D REV&INTERPJ: CPT | Performed by: INTERNAL MEDICINE

## 2022-04-06 NOTE — PROCEDURES
Cardiac event monitor report    Dates of recording 3/14/2022 through 3/26/2022    Summary impressions:    1. Sinus rhythm minimal heart rate 45 average 89 maximal 187    2. No episodes of ventricular tachycardia were recorded    3. No pauses greater than 3.0 seconds were recorded    4. No episodes of complete or Mobitz 2 atrioventricular block were recorded; rare Mobitz type I second-degree atrioventricular block was recorded. 5. No episodes of atrial fibrillation were recorded    6. No episodes of supraventricular tachycardia were recorded    7. 5 triggered events 0 diary events rhythm during those recording sinus rhythm    8.  Rare ectopy otherwise noted

## 2022-04-25 ENCOUNTER — TELEPHONE (OUTPATIENT)
Dept: UROLOGY | Age: 27
End: 2022-04-25

## 2022-04-25 NOTE — TELEPHONE ENCOUNTER
Pt. Called and states that amitripyline needs to be added to list of medicine she can not take. States it gives her bad side effects. Needing to have medicine changed. Please advise pt.

## 2022-05-04 ENCOUNTER — OFFICE VISIT (OUTPATIENT)
Dept: GASTROENTEROLOGY | Age: 27
End: 2022-05-04
Payer: MEDICAID

## 2022-05-04 VITALS
OXYGEN SATURATION: 99 % | BODY MASS INDEX: 19.26 KG/M2 | WEIGHT: 102 LBS | HEIGHT: 61 IN | HEART RATE: 83 BPM | DIASTOLIC BLOOD PRESSURE: 65 MMHG | SYSTOLIC BLOOD PRESSURE: 107 MMHG

## 2022-05-04 DIAGNOSIS — K52.9 CHRONIC DIARRHEA: Primary | ICD-10-CM

## 2022-05-04 DIAGNOSIS — R11.10 CHRONIC VOMITING: ICD-10-CM

## 2022-05-04 DIAGNOSIS — R11.0 CHRONIC NAUSEA: ICD-10-CM

## 2022-05-04 DIAGNOSIS — R10.84 GENERALIZED ABDOMINAL CRAMPING: ICD-10-CM

## 2022-05-04 DIAGNOSIS — G89.29 CHRONIC EPIGASTRIC PAIN: ICD-10-CM

## 2022-05-04 DIAGNOSIS — R10.13 CHRONIC EPIGASTRIC PAIN: ICD-10-CM

## 2022-05-04 PROCEDURE — 99204 OFFICE O/P NEW MOD 45 MIN: CPT | Performed by: NURSE PRACTITIONER

## 2022-05-04 ASSESSMENT — ENCOUNTER SYMPTOMS
CONSTIPATION: 0
DIARRHEA: 1
COUGH: 0
BLOOD IN STOOL: 0
RECTAL PAIN: 0
TROUBLE SWALLOWING: 0
SHORTNESS OF BREATH: 0
ABDOMINAL PAIN: 1
NAUSEA: 1
ABDOMINAL DISTENTION: 0
ANAL BLEEDING: 0
SORE THROAT: 1
VOMITING: 1
BACK PAIN: 1
VOICE CHANGE: 0

## 2022-05-04 NOTE — PROGRESS NOTES
Subjective:      Salvador Mercado is a23 y.o. female  Chief Complaint   Patient presents with    New Patient    Diarrhea       HPI  PCP: Mo Saini MD  Referring Provider: Ann Choe MD  New pt referral  From PCP  Pt reports the following GI complaints    Reports diarrhea  Started when she was 17years old  Used to alternate with soft formed stools but over the past 6 months its been diarrhea only  Has 5-6 diarrhea stools daily  Has generalized cramping all the time  But has sharp pains \"like knives\" that occur sometimes  And has nausea and vomiting with this. No blood in stool    Reports JAMAR pain  'Sensitive to touch\"  Chronic since childhood  Currently on oprotonix daily, this helps her heartburn but has never helped the JAMAR pain. Is s/p waqas      Family HX:    Pt denies family hx of colon polyps, colon CA, inflammatory bowel dx, gastric CA and esophageal CA.     Past Medical History:   Diagnosis Date    Allergic rhinitis           Past Surgical History:   Procedure Laterality Date    COLONOSCOPY  03/15/2012    Dr Alexandru Vergara  02/23/2014    Dr Arreola Done colitis (stool samples sent), cipro/flagyl    TONSILLECTOMY      TYMPANOSTOMY TUBE PLACEMENT  age 5   [facility] UPPER GASTROINTESTINAL ENDOSCOPY  05/11/2015    Dr Nik Camp reflux    UPPER GASTROINTESTINAL ENDOSCOPY  03/15/2012    Dr Ashli Carl (-)       Social History     Socioeconomic History    Marital status: Single     Spouse name: None    Number of children: None    Years of education: None    Highest education level: None   Occupational History    None   Tobacco Use    Smoking status: Never Smoker    Smokeless tobacco: Never Used   Vaping Use    Vaping Use: Never used   Substance and Sexual Activity    Alcohol use: Not Currently     Comment: maybe 2-3x a yr    Drug use: No    Sexual activity: None   Other Topics Concern    None   Social History Narrative    None     Social Determinants of Health     Financial Resource Strain: Low Risk     Difficulty of Paying Living Expenses: Not hard at all   Food Insecurity: No Food Insecurity    Worried About Running Out of Food in the Last Year: Never true    Debbie of Food in the Last Year: Never true   Transportation Needs:     Lack of Transportation (Medical): Not on file    Lack of Transportation (Non-Medical):  Not on file   Physical Activity:     Days of Exercise per Week: Not on file    Minutes of Exercise per Session: Not on file   Stress:     Feeling of Stress : Not on file   Social Connections:     Frequency of Communication with Friends and Family: Not on file    Frequency of Social Gatherings with Friends and Family: Not on file    Attends [employer] Services: Not on file    Active Member of [address] [place] [address] [employer] or Organizations: Not on file    Attends Club or Organization Meetings: Not on file    Marital Status: Not on file   Intimate Partner Violence:     Fear of Current or Ex-Partner: Not on file    Emotionally Abused: Not on file    Physically Abused: Not on file    Sexually Abused: Not on file   Housing Stability:     Unable to Pay for Housing in the Last Year: Not on file    Number of [place] in the Last Year: Not on file    Unstable Housing in the Last Year: Not on file       Allergies   Allergen Reactions    Latex Rash     Latex RAST test results 11/30/2018=  <0.1 kU/L-no significant reactivity detected    Penicillins Rash, Hives, Nausea And Vomiting, Palpitations and Shortness Of Breath    Amitriptyline     Bentyl [Dicyclomine Hcl] Other (See Comments)     Tachycardia    Dicyclomine Other (See Comments)     Heart rate increases    Paxil [Paroxetine Hcl] Other (See Comments)     Suicidal and worsen depression, work very on panic attacks    Desyrel [Trazodone] Nausea And Vomiting    Zoloft [Sertraline Hcl] Nausea And Vomiting       Current Outpatient Medications   Medication Sig Dispense Refill    topiramate (TOPAMAX) 50 MG tablet Take 1 tablet in the AM and 2 tablets in the PM      traMADol (ULTRAM) 50 MG tablet Take 50 mg by mouth every 6 hours as needed.  QUEtiapine (SEROQUEL) 25 MG tablet Take 25 mg by mouth at bedtime       pantoprazole (PROTONIX) 40 MG tablet Take 40 mg by mouth daily       amitriptyline (ELAVIL) 25 MG tablet Take 1 tablet by mouth nightly 30 tablet 0    ALPRAZolam (XANAX) 1 MG tablet Take 1 mg by mouth 3 times daily as needed. No current facility-administered medications for this visit. Review of Systems   Constitutional: Positive for fatigue. Negative for appetite change, fever and unexpected weight change. HENT: Positive for sore throat. Negative for trouble swallowing and voice change. Respiratory: Negative for cough and shortness of breath. Cardiovascular: Negative for chest pain, palpitations and leg swelling. Gastrointestinal: Positive for abdominal pain, diarrhea, nausea and vomiting. Negative for abdominal distention, anal bleeding, blood in stool, constipation and rectal pain. Genitourinary: Negative for hematuria. Musculoskeletal: Positive for back pain and neck pain. Negative for arthralgias. Neurological: Negative for dizziness, weakness, light-headedness and headaches. Psychiatric/Behavioral: Positive for dysphoric mood. Negative for sleep disturbance. The patient is nervous/anxious. All other systems reviewed and are negative. Objective:     Physical Exam  Vitals and nursing note reviewed. Constitutional:       Appearance: She is well-developed. Comments: /65 (Site: Left Upper Arm)   Pulse 83   Ht 5' 1\" (1.549 m)   Wt 102 lb (46.3 kg)   SpO2 99%   BMI 19.27 kg/m²    Eyes:      General: No scleral icterus. Conjunctiva/sclera: Conjunctivae normal.      Pupils: Pupils are equal, round, and reactive to light. Neck:      Thyroid: No thyromegaly. Cardiovascular:      Rate and Rhythm: Normal rate and regular rhythm.       Heart sounds: Normal heart sounds. No murmur heard. No friction rub. No gallop. Pulmonary:      Effort: Pulmonary effort is normal. No respiratory distress. Breath sounds: Normal breath sounds. Abdominal:      General: Bowel sounds are normal. There is no distension. Palpations: Abdomen is soft. Tenderness: There is abdominal tenderness. There is no rebound. Comments: abd pain in these locations with palpation   Musculoskeletal:         General: No deformity. Normal range of motion. Cervical back: Normal range of motion and neck supple. Neurological:      Mental Status: She is alert and oriented to person, place, and time. Cranial Nerves: No cranial nerve deficit. Psychiatric:         Judgment: Judgment normal.           Assessment:       Diagnosis Orders   1. Chronic diarrhea  COLONOSCOPY W/ OR W/O BIOPSY    ESOPHAGOSCOPY / EGD   2. Chronic nausea  ESOPHAGOSCOPY / EGD   3. Chronic vomiting  ESOPHAGOSCOPY / EGD   4. Chronic epigastric pain  ESOPHAGOSCOPY / EGD   5. Generalized abdominal cramping  COLONOSCOPY W/ OR W/O BIOPSY    ESOPHAGOSCOPY / EGD         Plan:      1. Schedule outpatient endoscopy r/o celiac and h pylori. Patient advised no Aspirin, Fish Oil, Vit E or NSAIDs 5 (five) days before procedure. Follow-up Visit: per Dr. Baljit Ruby  Pt Education:   Risks, benefits, and alternatives to endoscopy were discussed. Patient voices understanding of risks of, but not limited to, perforation, bleeding, and infection. The risk of perforation is increased with esophageal dilatation. All questions answered to patient's satisfaction. Patient is agreable to proceed. 2. Schedule outpatient colonoscopy with random colon bx. Patient advised no Aspirin, Fish Oil, Vit E or NSAIDs 5 (five) days before procedure. Follow-up Visit: per Dr Baljit Ruby  Pt education:  Risks, benefits, and alternatives to colonoscopy were discussed.  Risks of colonoscopy include, but are not limited to, perforation, bleeding, and infection. We discussed that the risk for perforation is 1-3 in 5,000  at the time of colonoscopy;   and 1-2% risk of bleeding post-polypectomy. All questions answered to the satisfaction of the patient. Pt is agreeable to proceed.   3. Sent pt home with diatherix stool kit test to r/o infectious etiology and h pylori

## 2022-05-04 NOTE — PATIENT INSTRUCTIONS

## 2022-05-18 ENCOUNTER — ANESTHESIA (OUTPATIENT)
Dept: OPERATING ROOM | Age: 27
End: 2022-05-18

## 2022-05-18 ENCOUNTER — TELEPHONE (OUTPATIENT)
Dept: GASTROENTEROLOGY | Age: 27
End: 2022-05-18

## 2022-05-18 ENCOUNTER — APPOINTMENT (OUTPATIENT)
Dept: OPERATING ROOM | Age: 27
End: 2022-05-18

## 2022-05-18 ENCOUNTER — HOSPITAL ENCOUNTER (OUTPATIENT)
Age: 27
Setting detail: OUTPATIENT SURGERY
Discharge: HOME | End: 2022-05-18
Attending: INTERNAL MEDICINE | Admitting: INTERNAL MEDICINE
Payer: MEDICAID

## 2022-05-18 ENCOUNTER — ANESTHESIA EVENT (OUTPATIENT)
Dept: OPERATING ROOM | Age: 27
End: 2022-05-18

## 2022-05-18 ENCOUNTER — HOSPITAL ENCOUNTER (OUTPATIENT)
Age: 27
Setting detail: SPECIMEN
Discharge: HOME | End: 2022-05-18
Payer: MEDICAID

## 2022-05-18 VITALS
HEART RATE: 77 BPM | HEIGHT: 61 IN | DIASTOLIC BLOOD PRESSURE: 59 MMHG | BODY MASS INDEX: 20.77 KG/M2 | RESPIRATION RATE: 16 BRPM | TEMPERATURE: 97.7 F | OXYGEN SATURATION: 100 % | WEIGHT: 110 LBS | SYSTOLIC BLOOD PRESSURE: 100 MMHG

## 2022-05-18 DIAGNOSIS — R11.10 CHRONIC VOMITING: ICD-10-CM

## 2022-05-18 DIAGNOSIS — R19.8 ABNORMAL FINDINGS ON ESOPHAGOGASTRODUODENOSCOPY (EGD): Primary | ICD-10-CM

## 2022-05-18 DIAGNOSIS — R10.13 CHRONIC EPIGASTRIC PAIN: ICD-10-CM

## 2022-05-18 DIAGNOSIS — R11.0 CHRONIC NAUSEA: ICD-10-CM

## 2022-05-18 DIAGNOSIS — G89.29 CHRONIC EPIGASTRIC PAIN: ICD-10-CM

## 2022-05-18 DIAGNOSIS — R10.84 GENERALIZED ABDOMINAL CRAMPING: ICD-10-CM

## 2022-05-18 PROCEDURE — 45380 COLONOSCOPY AND BIOPSY: CPT

## 2022-05-18 PROCEDURE — 88305 TISSUE EXAM BY PATHOLOGIST: CPT

## 2022-05-18 PROCEDURE — 45380 COLONOSCOPY AND BIOPSY: CPT | Performed by: INTERNAL MEDICINE

## 2022-05-18 PROCEDURE — 43239 EGD BIOPSY SINGLE/MULTIPLE: CPT

## 2022-05-18 PROCEDURE — 43239 EGD BIOPSY SINGLE/MULTIPLE: CPT | Performed by: INTERNAL MEDICINE

## 2022-05-18 RX ORDER — PROPOFOL 10 MG/ML
INJECTION, EMULSION INTRAVENOUS PRN
Status: DISCONTINUED | OUTPATIENT
Start: 2022-05-18 | End: 2022-05-18 | Stop reason: SDUPTHER

## 2022-05-18 RX ORDER — LIDOCAINE HYDROCHLORIDE 10 MG/ML
INJECTION, SOLUTION INFILTRATION; PERINEURAL PRN
Status: DISCONTINUED | OUTPATIENT
Start: 2022-05-18 | End: 2022-05-18 | Stop reason: SDUPTHER

## 2022-05-18 RX ORDER — SODIUM CHLORIDE 9 MG/ML
INJECTION, SOLUTION INTRAVENOUS CONTINUOUS
Status: DISCONTINUED | OUTPATIENT
Start: 2022-05-18 | End: 2022-05-18 | Stop reason: HOSPADM

## 2022-05-18 RX ADMIN — PROPOFOL 400 MG: 10 INJECTION, EMULSION INTRAVENOUS at 11:45

## 2022-05-18 RX ADMIN — LIDOCAINE HYDROCHLORIDE 40 MG: 10 INJECTION, SOLUTION INFILTRATION; PERINEURAL at 11:45

## 2022-05-18 RX ADMIN — SODIUM CHLORIDE: 9 INJECTION, SOLUTION INTRAVENOUS at 10:33

## 2022-05-18 NOTE — ANESTHESIA POSTPROCEDURE EVALUATION
Department of Anesthesiology  Postprocedure Note    Patient: Lyle Hedrick  MRN: [id number]  YOB: 1995  Date of evaluation: 5/18/2022  Time:  12:11 PM     Procedure Summary     Date: 05/18/22 Room / Location: [facility] ENDO 02 / [address]    Anesthesia Start: 3567 Anesthesia Stop:     Procedures:       EGD BIOPSY (N/A Esophagus)      COLONOSCOPY WITH BIOPSY (N/A Abdomen) Diagnosis: (N/V, DAIRRHEA, ABD PAIN)    Surgeons: Vern Tidwlel MD Responsible Provider: GENARO Frank CRNA    Anesthesia Type: general, TIVA ASA Status: 1          Anesthesia Type: No value filed. Gordo Phase I:      Gordo Phase II:      Last vitals: Reviewed and per EMR flowsheets.        Anesthesia Post Evaluation    Patient location during evaluation: bedside  Patient participation: complete - patient participated  Level of consciousness: sleepy but conscious  Pain score: 0  Airway patency: patent  Nausea & Vomiting: no nausea and no vomiting  Complications: no  Cardiovascular status: blood pressure returned to baseline  Respiratory status: acceptable, room air and spontaneous ventilation  Hydration status: euvolemic

## 2022-05-18 NOTE — ANESTHESIA PRE PROCEDURE
Department of Anesthesiology  Preprocedure Note       Name:  Tesfaye Dillard   Age:  27 y. o.  :  1995                                          MRN:  [id number]         Date:  2022      Surgeon: Reagan Cyr):  Dylon Root MD    Procedure: Procedure(s):  EGD BIOPSY  COLORECTAL CANCER SCREENING, NOT HIGH RISK    Medications prior to admission:   Prior to Admission medications    Medication Sig Start Date End Date Taking? Authorizing Provider   topiramate (TOPAMAX) 50 MG tablet Take 1 tablet in the AM and 2 tablets in the PM 22   Historical Provider, MD   traMADol (ULTRAM) 50 MG tablet Take 50 mg by mouth every 6 hours as needed. 22   Historical Provider, MD   QUEtiapine (SEROQUEL) 25 MG tablet Take 25 mg by mouth at bedtime  3/1/22   Historical Provider, MD   pantoprazole (PROTONIX) 40 MG tablet Take 40 mg by mouth daily     Historical Provider, MD   amitriptyline (ELAVIL) 25 MG tablet Take 1 tablet by mouth nightly  Patient not taking: Reported on 2022   Tari White, APRN - CNP   ALPRAZolam (XANAX) 1 MG tablet Take 1 mg by mouth 3 times daily as needed. 10/28/21   Historical Provider, MD       Current medications:    Current Facility-Administered Medications   Medication Dose Route Frequency Provider Last Rate Last Admin    0.9 % sodium chloride infusion   IntraVENous Continuous Dylon Root  mL/hr at 22 1033 New Bag at 22 1033       Allergies:     Allergies   Allergen Reactions    Latex Rash     Latex RAST test results 2018=  <0.1 kU/L-no significant reactivity detected    Penicillins Rash, Hives, Nausea And Vomiting, Palpitations and Shortness Of Breath    Amitriptyline     Bentyl [Dicyclomine Hcl] Other (See Comments)     Tachycardia    Dicyclomine Other (See Comments)     Heart rate increases    Paxil [Paroxetine Hcl] Other (See Comments)     Suicidal and worsen depression, work very on panic attacks    Desyrel [Trazodone] Nausea And Vomiting    Zoloft [Sertraline Hcl] Nausea And Vomiting       Problem List:    Patient Active Problem List   Diagnosis Code    Allergic rhinitis J30.9    IBS (irritable bowel syndrome) K58.9    Gastroesophageal reflux disease without esophagitis K21.9    Panic attack F41.0    Near syncope R55    Tachycardia R00.0    Chest pain R07.9    Generalized abdominal cramping R10.84    Chronic epigastric pain R10.13, G89.29    Chronic vomiting R11.10    Chronic nausea R11.0    Chronic diarrhea K52.9       Past Medical History:        Diagnosis Date    Allergic rhinitis        Past Surgical History:        Procedure Laterality Date    COLONOSCOPY  03/15/2012    Dr Liz Lewis  02/23/2014    Dr José Miguel Walter colitis (stool samples sent), cipro/flagyl    TONSILLECTOMY      TYMPANOSTOMY TUBE PLACEMENT  age 5   Gisel [facility] UPPER GASTROINTESTINAL ENDOSCOPY  05/11/2015    Dr Cameron Lai reflux    UPPER GASTROINTESTINAL ENDOSCOPY  03/15/2012    Dr Devante Villarreal (-)       Social History:    Social History     Tobacco Use    Smoking status: Never Smoker    Smokeless tobacco: Never Used   Substance Use Topics    Alcohol use: Not Currently     Comment: maybe 2-3x a yr                                Counseling given: Not Answered      Vital Signs (Current):   Vitals:    05/18/22 1024   BP: 112/69   Pulse: 85   Resp: 20   Temp: 97.7 °F (36.5 °C)   SpO2: 100%   Weight: 110 lb (49.9 kg)   Height: 5' 1\" (1.549 m)                                              BP Readings from Last 3 Encounters:   05/18/22 112/69   05/04/22 107/65   03/28/22 100/64       NPO Status: Time of last liquid consumption: 0500                        Time of last solid consumption: 2300                        Date of last liquid consumption: 05/18/22                        Date of last solid food consumption: 05/16/22    BMI:   Wt Readings from Last 3 Encounters:   05/18/22 110 lb (49.9 kg)   05/04/22 102 lb (46.3 kg)   04/04/22 103 lb 6.4 oz (46.9 kg)     Body mass index is 20.78 kg/m². CBC:   Lab Results   Component Value Date    WBC 5.6 03/28/2022    RBC 4.58 03/28/2022    HGB 14.2 03/28/2022    HCT 45.0 03/28/2022    MCV 98.3 03/28/2022    RDW 12.8 03/28/2022     03/28/2022       CMP:   Lab Results   Component Value Date     03/01/2022    K 4.2 03/01/2022     03/01/2022    CO2 18 03/01/2022    BUN 10 03/01/2022    CREATININE 0.6 03/01/2022    GFRAA >59 03/01/2022    LABGLOM >60 03/01/2022    GLUCOSE 95 03/01/2022    PROT 7.0 03/28/2022    CALCIUM 9.6 03/01/2022    BILITOT 0.4 03/28/2022    ALKPHOS 65 03/28/2022    AST 11 03/28/2022    ALT <5 03/28/2022       POC Tests: No results for input(s): POCGLU, POCNA, POCK, POCCL, POCBUN, POCHEMO, POCHCT in the last 72 hours. Coags: No results found for: PROTIME, INR, APTT    HCG (If Applicable): No results found for: PREGTESTUR, PREGSERUM, HCG, HCGQUANT     ABGs: No results found for: PHART, PO2ART, CWL4UUJ, NUK5JIU, BEART, B4SNRYVW     Type & Screen (If Applicable):  No results found for: LABABO, LABRH    Drug/Infectious Status (If Applicable):  No results found for: HIV, HEPCAB    COVID-19 Screening (If Applicable):   Lab Results   Component Value Date    COVID19 Not Detected 11/29/2021           Anesthesia Evaluation  Patient summary reviewed and Nursing notes reviewed  Airway: Mallampati: II  TM distance: >3 FB   Neck ROM: full  Mouth opening: > = 3 FB Dental: normal exam         Pulmonary:Negative Pulmonary ROS and normal exam                               Cardiovascular:Negative CV ROS  Exercise tolerance: good (>4 METS),                     Neuro/Psych:   Negative Neuro/Psych ROS              GI/Hepatic/Renal:   (+) GERD:,           Endo/Other: Negative Endo/Other ROS                    Abdominal:             Vascular: Other Findings:             Anesthesia Plan      general and TIVA     ASA 1       Induction: intravenous.       Anesthetic plan and risks discussed with patient. Plan discussed with CRNA.                   GENARO Vela - BILL   5/18/2022

## 2022-05-18 NOTE — TELEPHONE ENCOUNTER
Per Dr Alonso Likes note     Recommend   Consider a nuclear medicine gastric emptying scan study if not done previously      Called and notified Deedee Ktia (Mother)   Transferred to scheduling     Scheduled 06-06-22

## 2022-05-18 NOTE — OP NOTE
Patient: Shahab Steinberg : 1995  Med Rec#: [id number] Acc#: [id number]   Primary Care Provider Burt Chavira MD    Date of Procedure:  2022    Endoscopist: Abel Guzmán MD, MD    Referring Provider: Burt Chavira MD,     Operation Performed: Colonoscopy up to the terminal ileum with cold biopsies. Indications: For both EGD and colonoscopy exams today:    1. Chronic diarrhea          2. Chronic nausea     3. Chronic vomiting     4. Chronic epigastric pain     5. Generalized abdominal cramping       Anesthesia:  Sedation was administered by anesthesia who monitored the patient during the procedure. I met with Shahab Steinberg prior to procedure. We discussed the procedure itself, and I have discussed the risks of endoscopy (including-- but not limited to-- pain, discomfort, bleeding potentially requiring second endoscopic procedure and/or blood transfusion, organ perforation requiring operative repair, damage to organs near the colon, infection, aspiration, cardiopulmonary/allergic reaction), benefits, indications to endoscopy. Additionally, we discussed options other than colonoscopy. The patient expressed understanding. All questions answered. The patient decided to proceed with the procedure. Signed informed consent was placed on the chart. Blood Loss: minimal    Withdrawal time: >6 mins  Bowel Prep: adequate     Complications: no immediate complications    DESCRIPTION OF PROCEDURE:     A time out was performed. After written informed consent was obtained, the patient was placed in the left lateral position. The perianal area was inspected, and a digital rectal exam was performed. A rectal exam was performed: normal tone, no palpable lesions. At this point, a forward viewing Olympus colonoscope was inserted into the anus and carefully advanced to the terminal ileum. The cecum was identified by the ileocecal valve and the appendiceal orifice.  The colonoscope was then slowly withdrawn with careful inspection of the mucosa in a linear and circumferential fashion. The scope was retroflexed in the rectum. Suction was utilized during the procedure to remove as much air as possible from the bowel. The colonoscope was removed from the patient, and the procedure was terminated. Findings are listed below. Findings: The mucosa appeared normal throughout the entire examined colon and the examined terminal ileum; random cold biopsies were taken to check for microscopic colitis. NO large polyps or masses or strictures or colitis or ileitis or overt IBD. Retroflexion in the rectum was normal and revealed no further abnormalities. Patient's diarrhea is likely related to IBS-D or to dietary factors or possibly to non-GI causes. Recommendations:  1. Repeat colonoscopy: pending pathology -if no evidence of microscopic colitis, at age 41 to begin colorectal cancer screening; sooner if her personal or family history as pertaining to colorectal cancer risk changes requiring an earlier exam or if the patient were to develop lower GI symptoms such as bleeding, abdominal pain, change in bowel habits or stool caliber or if the patient has anemia or unexplained weight loss in the future. 2. Await biopsy results-you will receive a letter with your results within 7-10 days.    - Resume previous meds and diet  - GI clinic f/u 4-6 weeks with Ms. Cruz  - Keep scheduled f/u appts with other MDs     - NO ASA/NSAIDs x 2 weeks    Findings and recommendations were discussed w/ the patient. A copy of the images was provided.     Daria Swain MD, MD  5/18/2022  11:55 AM

## 2022-05-18 NOTE — H&P
Patient Name: Cullen Wolff  : 1995  MRN: [id number]  DATE: 22    Allergies: Allergies   Allergen Reactions    Latex Rash     Latex RAST test results 2018=  <0.1 kU/L-no significant reactivity detected    Penicillins Rash, Hives, Nausea And Vomiting, Palpitations and Shortness Of Breath    Amitriptyline     Bentyl [Dicyclomine Hcl] Other (See Comments)     Tachycardia    Dicyclomine Other (See Comments)     Heart rate increases    Paxil [Paroxetine Hcl] Other (See Comments)     Suicidal and worsen depression, work very on panic attacks    Desyrel [Trazodone] Nausea And Vomiting    Zoloft [Sertraline Hcl] Nausea And Vomiting        ENDOSCOPY  History and Physical    Procedure:    [x] Diagnostic Colonoscopy       [] Screening Colonoscopy  [x] EGD      [] ERCP      [] EUS       [] Other    [x] Previous office notes/History and Physical reviewed from the patients chart. Please see EMR for further details of HPI. I have examined the patient's status immediately prior to the procedure and:      Indications/HPI: For both EGD and colonoscopy exams today:    1. Chronic diarrhea          2. Chronic nausea     3. Chronic vomiting     4. Chronic epigastric pain     5.  Generalized abdominal cramping       []Abdominal Pain   []Cancer- GI/Lung     []Fhx of colon CA/polyps  []History of Polyps  []Barretts            []Melena  []Abnormal Imaging              []Dysphagia              []Persistent Pneumonia   []Anemia                            []Food Impaction        []History of Polyps  [] GI Bleed             []Pulmonary nodule/Mass   []Change in bowel habits []Heartburn/Reflux  []Rectal Bleed (BRBPR)  []Chest Pain - Non Cardiac []Heme (+) Stool []Ulcers  []Constipation  []Hemoptysis  []Varices  []Diarrhea  []Hypoxemia    []Nausea/Vomiting   []Screening   []Crohns/Colitis  []Other:     Anesthesia:   [x] MAC [] Moderate Sedation   [] General   [] None     ROS: 12 pt Review of Symptoms was negative unless mentioned above    Medications:   Prior to Admission medications    Medication Sig Start Date End Date Taking? Authorizing Provider   topiramate (TOPAMAX) 50 MG tablet Take 1 tablet in the AM and 2 tablets in the PM 1/7/22   Historical Provider, MD   traMADol (ULTRAM) 50 MG tablet Take 50 mg by mouth every 6 hours as needed. 1/7/22   Historical Provider, MD   QUEtiapine (SEROQUEL) 25 MG tablet Take 25 mg by mouth at bedtime  3/1/22   Historical Provider, MD   pantoprazole (PROTONIX) 40 MG tablet Take 40 mg by mouth daily     Historical Provider, MD   amitriptyline (ELAVIL) 25 MG tablet Take 1 tablet by mouth nightly  Patient not taking: Reported on 5/18/2022 4/4/22   Tari White, APRN - CNP   ALPRAZolam (XANAX) 1 MG tablet Take 1 mg by mouth 3 times daily as needed. 10/28/21   Historical Provider, MD       Past Medical History:  Past Medical History:   Diagnosis Date    Allergic rhinitis        Past Surgical History:  Past Surgical History:   Procedure Laterality Date    COLONOSCOPY  03/15/2012    Dr Cesar Mcmullen  02/23/2014    Dr Nirav Fernandez colitis (stool samples sent), cipro/flagyl    TONSILLECTOMY      TYMPANOSTOMY TUBE PLACEMENT  age 5   [facility]OS[facility]  05/11/2015    Dr Tashi Irving reflux    UPPER GASTROINTESTINAL ENDOSCOPY  03/15/2012    Dr Le Fall (-)       Social History:  Social History     Tobacco Use    Smoking status: Never Smoker    Smokeless tobacco: Never Used   Vaping Use    Vaping Use: Never used   Substance Use Topics    Alcohol use: Not Currently     Comment: maybe 2-3x a yr    Drug use: No       Vital Signs:   Vitals:    05/18/22 1024   BP: 112/69   Pulse: 85   Resp: 20   Temp: 97.7 °F (36.5 °C)   SpO2: 100%        Physical Exam:  Cardiac:  [x]WNL  []Comments:  Pulmonary:  [x]WNL   []Comments:  Neuro/Mental Status:  [x]WNL  []Comments:  Abdominal:  [x]WNL    []Comments:  Other:   []WNL  []Comments:    Informed Consent:   The risks and benefits of the procedure have been discussed with either the patient or if they cannot consent, their representative. Assessment:  Patient examined and appropriate for planned sedation and procedure. Plan:  Proceed with planned sedation and procedure as above.          Yves Nicolas MD

## 2022-05-18 NOTE — OP NOTE
Endoscopic Procedure Note    Patient: Ivy Lombardi : 1995  Med Rec#: [id number] Acc#: [id number]     Primary Care Provider Carmen Kennedy MD    Endoscopist: Yves Nicolas MD, MD    Date of Procedure:  2022    Procedure:   1. EGD with cold biopsies    Indications: For both EGD and colonoscopy exams today:    1. Chronic diarrhea          2. Chronic nausea     3. Chronic vomiting     4. Chronic epigastric pain     5. Generalized abdominal cramping       Anesthesia:  Sedation was administered by anesthesia who monitored the patient during the procedure. Estimated Blood Loss: minimal    Procedure:   After reviewing the patient's chart and obtaining informed consent, the patient was placed in the left lateral decubitus position. A forward-viewing Olympus endoscope was lubricated and inserted through the mouth into the oropharynx. Under direct visualization, the upper esophagus was intubated. The scope was advanced to the level of the third portion of duodenum. Scope was slowly withdrawn with careful inspection of the mucosal surfaces. The scope was retroflexed for inspection of the gastric fundus and incisura. Findings and maneuvers are listed in impression below. The patient tolerated the procedure well. The scope was removed. There were no immediate complications. Findings/IMPRESSION:  Esophagus: normal and GE junction at 38 cm also appeared normal  NO erosions or ulcers or nodules or strictures or webs or rings or mass lesions or extrinsic compression or diverticula noted. There is no obvious hiatal hernia present. Stomach:  Normal.    NO ulcers or masses or gastric outlet obstruction or retained food or fluid. Rugae were normal and lumen distended well with insufflation. Retroflexed views otherwise revealed a normal GE junction, fundus and cardia as well. Duodenum: Normal. Random biopsies were taken to check for Celiac disease and other causes of villous atrophy. RECOMMENDATIONS:    1. Await path results, the patient will be contacted in 7-10 days with biopsy results. 2.  Small frequent meals and a soft diet until her nausea and vomiting improved  3. Consider a nuclear medicine gastric emptying scan study if not done previously  4. Continue antiemetics as needed    The results were discussed with the patient and family. A copy of the images obtained were given to the patient.      Lorraine Barahona MD, MD  5/18/2022  11:56 AM

## 2022-06-06 ENCOUNTER — HOSPITAL ENCOUNTER (OUTPATIENT)
Dept: NUCLEAR MEDICINE | Age: 27
Discharge: HOME | End: 2022-06-08
Payer: MEDICAID

## 2022-06-06 DIAGNOSIS — R19.8 ABNORMAL FINDINGS ON ESOPHAGOGASTRODUODENOSCOPY (EGD): ICD-10-CM

## 2022-06-06 DIAGNOSIS — R10.84 GENERALIZED ABDOMINAL CRAMPING: ICD-10-CM

## 2022-06-06 DIAGNOSIS — G89.29 CHRONIC EPIGASTRIC PAIN: ICD-10-CM

## 2022-06-06 DIAGNOSIS — R10.13 CHRONIC EPIGASTRIC PAIN: ICD-10-CM

## 2022-06-06 DIAGNOSIS — R11.10 CHRONIC VOMITING: ICD-10-CM

## 2022-06-06 DIAGNOSIS — R11.0 CHRONIC NAUSEA: ICD-10-CM

## 2022-06-06 PROCEDURE — 3430000000 HC RX DIAGNOSTIC RADIOPHARMACEUTICAL: Performed by: INTERNAL MEDICINE

## 2022-06-06 PROCEDURE — 78264 GASTRIC EMPTYING IMG STUDY: CPT

## 2022-06-06 PROCEDURE — 78264 GASTRIC EMPTYING IMG STUDY: CPT | Performed by: RADIOLOGY

## 2022-06-06 PROCEDURE — A9541 TC99M SULFUR COLLOID: HCPCS | Performed by: INTERNAL MEDICINE

## 2022-06-06 RX ADMIN — Medication 2 MILLICURIE: at 15:16

## 2022-06-07 ENCOUNTER — TELEPHONE (OUTPATIENT)
Dept: GASTROENTEROLOGY | Age: 27
End: 2022-06-07

## 2022-06-07 NOTE — TELEPHONE ENCOUNTER
6-7-22    REMINDER CALL TO PT FOR HER BRING BACK  THE DIATHERIX TEST BEFORE THIS THURS SO WE CAN HAVE THE RESULTS BEFORE HER APPT ON 6-15-22    PT VOICED UNDERSTANDING.

## 2022-06-13 ENCOUNTER — TELEPHONE (OUTPATIENT)
Dept: GASTROENTEROLOGY | Age: 27
End: 2022-06-13

## 2022-06-13 NOTE — TELEPHONE ENCOUNTER
Virginia Cazares, this pt is on your schedule 6-15-22,  She has not brought back the diatherix stool test.      Do I need to reschedule this appt? ??

## 2022-06-13 NOTE — TELEPHONE ENCOUNTER
Yes please I need results of this prior to her appt so we can develop a plan of care at her OV appt. Thanks!

## 2022-07-05 ENCOUNTER — TELEPHONE (OUTPATIENT)
Dept: CARDIOLOGY CLINIC | Age: 27
End: 2022-07-05

## 2022-07-05 ENCOUNTER — TELEPHONE (OUTPATIENT)
Dept: GASTROENTEROLOGY | Age: 27
End: 2022-07-05

## 2022-07-11 ENCOUNTER — TELEPHONE (OUTPATIENT)
Dept: OBGYN CLINIC | Age: 27
End: 2022-07-11

## 2022-07-11 NOTE — TELEPHONE ENCOUNTER
Elly requests that office return their call. The best time to reach her is Anytime. Patient states she is returning [employer]. Call, did not provide additional information. Thank you.

## 2022-08-26 ENCOUNTER — OFFICE VISIT (OUTPATIENT)
Dept: FAMILY MEDICINE CLINIC | Age: 27
End: 2022-08-26
Payer: MEDICAID

## 2022-08-26 VITALS
DIASTOLIC BLOOD PRESSURE: 68 MMHG | OXYGEN SATURATION: 99 % | BODY MASS INDEX: 18.5 KG/M2 | HEART RATE: 117 BPM | HEIGHT: 61 IN | TEMPERATURE: 98.1 F | WEIGHT: 98 LBS | SYSTOLIC BLOOD PRESSURE: 116 MMHG

## 2022-08-26 DIAGNOSIS — K21.9 GASTROESOPHAGEAL REFLUX DISEASE WITHOUT ESOPHAGITIS: ICD-10-CM

## 2022-08-26 DIAGNOSIS — K58.9 IRRITABLE BOWEL SYNDROME, UNSPECIFIED TYPE: Primary | ICD-10-CM

## 2022-08-26 DIAGNOSIS — Z11.59 NEED FOR HEPATITIS C SCREENING TEST: ICD-10-CM

## 2022-08-26 DIAGNOSIS — Z11.4 ENCOUNTER FOR SCREENING FOR HIV: ICD-10-CM

## 2022-08-26 DIAGNOSIS — M79.7 FIBROMYALGIA: ICD-10-CM

## 2022-08-26 LAB
ALBUMIN SERPL-MCNC: 4.4 G/DL (ref 3.5–5.2)
ALP BLD-CCNC: 62 U/L (ref 35–104)
ALT SERPL-CCNC: 6 U/L (ref 5–33)
ANION GAP SERPL CALCULATED.3IONS-SCNC: 13 MMOL/L (ref 7–19)
AST SERPL-CCNC: 13 U/L (ref 5–32)
BASOPHILS ABSOLUTE: 0 K/UL (ref 0–0.2)
BASOPHILS RELATIVE PERCENT: 0.8 % (ref 0–1)
BILIRUB SERPL-MCNC: 0.3 MG/DL (ref 0.2–1.2)
BUN BLDV-MCNC: 7 MG/DL (ref 6–20)
CALCIUM SERPL-MCNC: 9 MG/DL (ref 8.6–10)
CHLORIDE BLD-SCNC: 104 MMOL/L (ref 98–111)
CO2: 22 MMOL/L (ref 22–29)
CREAT SERPL-MCNC: 0.7 MG/DL (ref 0.5–0.9)
EOSINOPHILS ABSOLUTE: 0.1 K/UL (ref 0–0.6)
EOSINOPHILS RELATIVE PERCENT: 1.2 % (ref 0–5)
GFR AFRICAN AMERICAN: >59
GFR NON-AFRICAN AMERICAN: >60
GLUCOSE BLD-MCNC: 84 MG/DL (ref 74–109)
HCT VFR BLD CALC: 43.7 % (ref 37–47)
HEMOGLOBIN: 14 G/DL (ref 12–16)
HEPATITIS C ANTIBODY INTERPRETATION: NORMAL
HIV-1 P24 AG: NORMAL
IMMATURE GRANULOCYTES #: 0 K/UL
LYMPHOCYTES ABSOLUTE: 2 K/UL (ref 1.1–4.5)
LYMPHOCYTES RELATIVE PERCENT: 39.6 % (ref 20–40)
MCH RBC QN AUTO: 32 PG (ref 27–31)
MCHC RBC AUTO-ENTMCNC: 32 G/DL (ref 33–37)
MCV RBC AUTO: 99.8 FL (ref 81–99)
MONOCYTES ABSOLUTE: 0.4 K/UL (ref 0–0.9)
MONOCYTES RELATIVE PERCENT: 8.5 % (ref 0–10)
NEUTROPHILS ABSOLUTE: 2.5 K/UL (ref 1.5–7.5)
NEUTROPHILS RELATIVE PERCENT: 49.3 % (ref 50–65)
PDW BLD-RTO: 13.2 % (ref 11.5–14.5)
PLATELET # BLD: 224 K/UL (ref 130–400)
PMV BLD AUTO: 11.2 FL (ref 9.4–12.3)
POTASSIUM SERPL-SCNC: 4.1 MMOL/L (ref 3.5–5)
RAPID HIV 1&2: NORMAL
RBC # BLD: 4.38 M/UL (ref 4.2–5.4)
RHEUMATOID FACTOR: <10 IU/ML
SEDIMENTATION RATE, ERYTHROCYTE: 4 MM/HR (ref 0–20)
SODIUM BLD-SCNC: 139 MMOL/L (ref 136–145)
TOTAL PROTEIN: 6.8 G/DL (ref 6.6–8.7)
WBC # BLD: 5 K/UL (ref 4.8–10.8)

## 2022-08-26 PROCEDURE — 99214 OFFICE O/P EST MOD 30 MIN: CPT | Performed by: FAMILY MEDICINE

## 2022-08-26 RX ORDER — TRAMADOL HYDROCHLORIDE 50 MG/1
50 TABLET ORAL EVERY 6 HOURS PRN
Status: CANCELLED | OUTPATIENT
Start: 2022-08-26

## 2022-08-26 RX ORDER — DICYCLOMINE HCL 20 MG
20 TABLET ORAL EVERY 6 HOURS
Qty: 120 TABLET | Refills: 5 | Status: SHIPPED | OUTPATIENT
Start: 2022-08-26

## 2022-08-26 RX ORDER — PANTOPRAZOLE SODIUM 40 MG/1
40 TABLET, DELAYED RELEASE ORAL DAILY
Qty: 30 TABLET | Refills: 5 | Status: SHIPPED | OUTPATIENT
Start: 2022-08-26

## 2022-08-26 RX ORDER — TRAMADOL HYDROCHLORIDE 50 MG/1
50 TABLET ORAL EVERY 6 HOURS PRN
Qty: 30 TABLET | Refills: 0 | Status: SHIPPED | OUTPATIENT
Start: 2022-08-26 | End: 2022-09-25

## 2022-08-26 ASSESSMENT — ENCOUNTER SYMPTOMS
RESPIRATORY NEGATIVE: 1
EYES NEGATIVE: 1
GASTROINTESTINAL NEGATIVE: 1
ALLERGIC/IMMUNOLOGIC NEGATIVE: 1

## 2022-08-26 ASSESSMENT — PATIENT HEALTH QUESTIONNAIRE - PHQ9
SUM OF ALL RESPONSES TO PHQ QUESTIONS 1-9: 0
2. FEELING DOWN, DEPRESSED OR HOPELESS: 0
SUM OF ALL RESPONSES TO PHQ QUESTIONS 1-9: 0
1. LITTLE INTEREST OR PLEASURE IN DOING THINGS: 0
SUM OF ALL RESPONSES TO PHQ9 QUESTIONS 1 & 2: 0

## 2022-08-26 NOTE — PROGRESS NOTES
SUBJECTIVE:    Patient ID: Yolanda Gonzalez is a 27 y. o.female. HPI:   Here for follow-up of multiple medical problems  Patient is a 82-year-old white female. She have chronic abdominal pain associated with vomiting and diarrhea. She was referred to GI. She have used Bentyl in the past but she thought it was not helpful. She is willing to try it again. She also have anxiety. Sometimes anxiety make her feel bad 2. She also complains of generalized pain. Pain is above and below her waist.  Pain is in the muscles and joints. She was given tramadol. She used it only once in a while. Averaged about once a week before that. She does not like taking it. She never been diagnosed with anything. No source of pain was ever identified. Past Medical History:   Diagnosis Date    Allergic rhinitis       Current Outpatient Medications   Medication Sig Dispense Refill    pantoprazole (PROTONIX) 40 MG tablet Take 1 tablet by mouth daily Take 40 mg by mouth daily 30 tablet 5    dicyclomine (BENTYL) 20 MG tablet Take 1 tablet by mouth in the morning and 1 tablet at noon and 1 tablet in the evening and 1 tablet before bedtime. 120 tablet 5    traMADol (ULTRAM) 50 MG tablet Take 1 tablet by mouth every 6 hours as needed for Pain for up to 30 days. 30 tablet 0    topiramate (TOPAMAX) 50 MG tablet Take 1 tablet in the AM and 2 tablets in the PM      QUEtiapine (SEROQUEL) 25 MG tablet Take 25 mg by mouth at bedtime       ALPRAZolam (XANAX) 1 MG tablet Take 1 mg by mouth 3 times daily as needed. No current facility-administered medications for this visit.       Allergies   Allergen Reactions    Latex Rash     Latex RAST test results 11/30/2018=  <0.1 kU/L-no significant reactivity detected    Penicillins Rash, Hives, Nausea And Vomiting, Palpitations and Shortness Of Breath    Amitriptyline     Bentyl [Dicyclomine Hcl] Other (See Comments)     Tachycardia    Dicyclomine Other (See Comments)     Heart rate increases Paxil [Paroxetine Hcl] Other (See Comments)     Suicidal and worsen depression, work very on panic attacks    Desyrel [Trazodone] Nausea And Vomiting    Zoloft [Sertraline Hcl] Nausea And Vomiting       Review of Systems   Constitutional: Negative. HENT: Negative. Eyes: Negative. Respiratory: Negative. Cardiovascular: Negative. Gastrointestinal: Negative. Endocrine: Negative. Genitourinary: Negative. Musculoskeletal: Negative. Skin: Negative. Allergic/Immunologic: Negative. Neurological: Negative. Hematological: Negative. Psychiatric/Behavioral: Negative. OBJECTIVE:    Physical Exam  Constitutional:       Appearance: Normal appearance. She is well-developed and well-groomed. HENT:      Head: Normocephalic and atraumatic. Right Ear: Tympanic membrane, ear canal and external ear normal. There is no impacted cerumen. Left Ear: Tympanic membrane, ear canal and external ear normal. There is no impacted cerumen. Nose: Nose normal.      Mouth/Throat:      Lips: Pink. Mouth: Mucous membranes are moist.      Dentition: Normal dentition. Pharynx: Oropharynx is clear. Uvula midline. Eyes:      General: Lids are normal.         Right eye: No discharge. Left eye: No discharge. Extraocular Movements: Extraocular movements intact. Conjunctiva/sclera: Conjunctivae normal.      Right eye: Right conjunctiva is not injected. Left eye: Left conjunctiva is not injected. Pupils: Pupils are equal, round, and reactive to light. Neck:      Thyroid: No thyromegaly. Vascular: No carotid bruit or JVD. Cardiovascular:      Rate and Rhythm: Normal rate and regular rhythm. Pulses: Normal pulses. Carotid pulses are 2+ on the right side and 2+ on the left side. Radial pulses are 2+ on the right side and 2+ on the left side.       Heart sounds: Normal heart sounds, S1 normal and S2 normal. No murmur heard.  Pulmonary:      Effort: Pulmonary effort is normal.      Breath sounds: Normal breath sounds. Chest:   Breasts:     Right: No supraclavicular adenopathy. Left: No supraclavicular adenopathy. Abdominal:      General: Bowel sounds are normal. There is no distension or abdominal bruit. Palpations: Abdomen is soft. There is no mass. Hernia: No hernia is present. Musculoskeletal:         General: Tenderness (Multiple tender points) present. Normal range of motion. Cervical back: Full passive range of motion without pain, normal range of motion and neck supple. Right lower leg: No edema. Left lower leg: No edema. Lymphadenopathy:      Cervical: No cervical adenopathy. Right cervical: No superficial cervical adenopathy. Left cervical: No superficial cervical adenopathy. Upper Body:      Right upper body: No supraclavicular adenopathy. Left upper body: No supraclavicular adenopathy. Skin:     General: Skin is warm and dry. Coloration: Skin is not pale. Findings: No lesion or rash. Nails: There is no clubbing. Neurological:      Mental Status: She is alert and oriented to person, place, and time. Motor: No weakness or tremor. Coordination: Coordination normal.      Deep Tendon Reflexes: Reflexes are normal and symmetric. Psychiatric:         Attention and Perception: Attention normal.         Mood and Affect: Mood normal.         Speech: Speech normal.         Behavior: Behavior normal. Behavior is cooperative. Thought Content: Thought content normal.         Cognition and Memory: Cognition and memory normal.         Judgment: Judgment normal.      /68 (Site: Left Upper Arm, Position: Sitting, Cuff Size: Medium Adult)   Pulse (!) 117   Temp 98.1 °F (36.7 °C) (Temporal)   Ht 5' 1\" (1.549 m)   Wt 98 lb (44.5 kg)   SpO2 99%   BMI 18.52 kg/m²      ASSESSMENT:     Diagnosis Orders   1.  Irritable bowel syndrome, unspecified type-? dicyclomine (BENTYL) 20 MG tablet      2. Gastroesophageal reflux disease without esophagitis-ongoing pantoprazole (PROTONIX) 40 MG tablet      3. Fibromyalgia-suspected traMADol (ULTRAM) 50 MG tablet    Sedimentation Rate    Rheumatoid Factor    CBC with Auto Differential    Comprehensive Metabolic Panel      4. Encounter for screening for HIV  HIV Rapid 1&2      5. Need for hepatitis C screening test  Hepatitis C Antibody           PLAN:    1. Follow-up with GI. Trial of Bentyl again. 2.  Continue PPI. 3.  Refill tramadol. Belinda [facility]. Blood work. Fibromyalgia testing. Consider adding nortriptyline. 4.  Blood work  5.   Blood work  Follow-up

## 2022-09-26 ENCOUNTER — OFFICE VISIT (OUTPATIENT)
Dept: FAMILY MEDICINE CLINIC | Age: 27
End: 2022-09-26
Payer: MEDICAID

## 2022-09-26 VITALS
DIASTOLIC BLOOD PRESSURE: 64 MMHG | HEART RATE: 93 BPM | BODY MASS INDEX: 18.71 KG/M2 | SYSTOLIC BLOOD PRESSURE: 122 MMHG | WEIGHT: 99 LBS | TEMPERATURE: 98.1 F | OXYGEN SATURATION: 99 %

## 2022-09-26 DIAGNOSIS — N12 PYELONEPHRITIS: Primary | ICD-10-CM

## 2022-09-26 DIAGNOSIS — N12 PYELONEPHRITIS: ICD-10-CM

## 2022-09-26 LAB
ALBUMIN SERPL-MCNC: 4.5 G/DL (ref 3.5–5.2)
ALP BLD-CCNC: 66 U/L (ref 35–104)
ALT SERPL-CCNC: 6 U/L (ref 5–33)
ANION GAP SERPL CALCULATED.3IONS-SCNC: 10 MMOL/L (ref 7–19)
AST SERPL-CCNC: 13 U/L (ref 5–32)
BASOPHILS ABSOLUTE: 0 K/UL (ref 0–0.2)
BASOPHILS RELATIVE PERCENT: 0.7 % (ref 0–1)
BILIRUB SERPL-MCNC: 0.6 MG/DL (ref 0.2–1.2)
BUN BLDV-MCNC: 10 MG/DL (ref 6–20)
C-REACTIVE PROTEIN: <0.3 MG/DL (ref 0–0.5)
CALCIUM SERPL-MCNC: 9.2 MG/DL (ref 8.6–10)
CHLORIDE BLD-SCNC: 107 MMOL/L (ref 98–111)
CO2: 24 MMOL/L (ref 22–29)
CREAT SERPL-MCNC: 0.6 MG/DL (ref 0.5–0.9)
EOSINOPHILS ABSOLUTE: 0 K/UL (ref 0–0.6)
EOSINOPHILS RELATIVE PERCENT: 0.6 % (ref 0–5)
GFR AFRICAN AMERICAN: >59
GFR NON-AFRICAN AMERICAN: >60
GLUCOSE BLD-MCNC: 94 MG/DL (ref 74–109)
HCG(URINE) PREGNANCY TEST: NEGATIVE
HCT VFR BLD CALC: 45.1 % (ref 37–47)
HEMOGLOBIN: 14.4 G/DL (ref 12–16)
IMMATURE GRANULOCYTES #: 0 K/UL
LYMPHOCYTES ABSOLUTE: 2.2 K/UL (ref 1.1–4.5)
LYMPHOCYTES RELATIVE PERCENT: 40.7 % (ref 20–40)
MCH RBC QN AUTO: 31.4 PG (ref 27–31)
MCHC RBC AUTO-ENTMCNC: 31.9 G/DL (ref 33–37)
MCV RBC AUTO: 98.5 FL (ref 81–99)
MONOCYTES ABSOLUTE: 0.4 K/UL (ref 0–0.9)
MONOCYTES RELATIVE PERCENT: 7.2 % (ref 0–10)
NEUTROPHILS ABSOLUTE: 2.7 K/UL (ref 1.5–7.5)
NEUTROPHILS RELATIVE PERCENT: 50.4 % (ref 50–65)
PDW BLD-RTO: 12.9 % (ref 11.5–14.5)
PLATELET # BLD: 238 K/UL (ref 130–400)
PMV BLD AUTO: 10.4 FL (ref 9.4–12.3)
POTASSIUM SERPL-SCNC: 3.9 MMOL/L (ref 3.5–5)
RBC # BLD: 4.58 M/UL (ref 4.2–5.4)
SARS-COV-2, PCR: NOT DETECTED
SODIUM BLD-SCNC: 141 MMOL/L (ref 136–145)
TOTAL PROTEIN: 7.1 G/DL (ref 6.6–8.7)
WBC # BLD: 5.4 K/UL (ref 4.8–10.8)

## 2022-09-26 PROCEDURE — 99214 OFFICE O/P EST MOD 30 MIN: CPT | Performed by: FAMILY MEDICINE

## 2022-09-26 RX ORDER — CEFDINIR 300 MG/1
300 CAPSULE ORAL 2 TIMES DAILY
Qty: 20 CAPSULE | Refills: 0 | Status: SHIPPED | OUTPATIENT
Start: 2022-09-26 | End: 2022-10-06

## 2022-09-26 ASSESSMENT — ENCOUNTER SYMPTOMS
GASTROINTESTINAL NEGATIVE: 1
RESPIRATORY NEGATIVE: 1
ALLERGIC/IMMUNOLOGIC NEGATIVE: 1
EYES NEGATIVE: 1

## 2022-09-26 NOTE — PROGRESS NOTES
SUBJECTIVE:    Patient ID: Clarke Sheehan is a 27 y. o.female. HPI:   Patient here for acute problem  Patient is a 51-year-old female. She complains of severe right flank pain associated with nausea and fevers. She has history of UTIs. She does not have a gallbladder. She feels very fatigued. No skin rashes. No history of kidney stones. Past Medical History:   Diagnosis Date    Allergic rhinitis       Current Outpatient Medications   Medication Sig Dispense Refill    cefdinir (OMNICEF) 300 MG capsule Take 1 capsule by mouth 2 times daily for 10 days 20 capsule 0    pantoprazole (PROTONIX) 40 MG tablet Take 1 tablet by mouth daily Take 40 mg by mouth daily 30 tablet 5    dicyclomine (BENTYL) 20 MG tablet Take 1 tablet by mouth in the morning and 1 tablet at noon and 1 tablet in the evening and 1 tablet before bedtime. 120 tablet 5    topiramate (TOPAMAX) 50 MG tablet Take 1 tablet in the AM and 2 tablets in the PM      QUEtiapine (SEROQUEL) 25 MG tablet Take 25 mg by mouth at bedtime       ALPRAZolam (XANAX) 1 MG tablet Take 1 mg by mouth 3 times daily as needed. Current Facility-Administered Medications   Medication Dose Route Frequency Provider Last Rate Last Admin    cefTRIAXone (ROCEPHIN) 1,000 mg in lidocaine 1 % 2.86 mL IM Injection  1,000 mg IntraMUSCular Once Yen Molina MD          Allergies   Allergen Reactions    Latex Rash     Latex RAST test results 11/30/2018=  <0.1 kU/L-no significant reactivity detected    Penicillins Rash, Hives, Nausea And Vomiting, Palpitations and Shortness Of Breath    Amitriptyline     Dicyclomine Other (See Comments)     Heart rate increases    Paxil [Paroxetine Hcl] Other (See Comments)     Suicidal and worsen depression, work very on panic attacks    Desyrel [Trazodone] Nausea And Vomiting    Zoloft [Sertraline Hcl] Nausea And Vomiting       Review of Systems   Constitutional:  Positive for fatigue and fever. HENT: Negative. Eyes: Negative. present. Musculoskeletal:         General: Normal range of motion. Cervical back: Full passive range of motion without pain, normal range of motion and neck supple. Right lower leg: No edema. Left lower leg: No edema. Lymphadenopathy:      Cervical: No cervical adenopathy. Right cervical: No superficial cervical adenopathy. Left cervical: No superficial cervical adenopathy. Upper Body:      Right upper body: No supraclavicular adenopathy. Left upper body: No supraclavicular adenopathy. Skin:     General: Skin is warm and dry. Coloration: Skin is not pale. Findings: No lesion or rash. Nails: There is no clubbing. Neurological:      Mental Status: She is alert and oriented to person, place, and time. Motor: No weakness or tremor. Coordination: Coordination normal.      Deep Tendon Reflexes: Reflexes are normal and symmetric. Psychiatric:         Attention and Perception: Attention normal.         Mood and Affect: Mood normal.         Speech: Speech normal.         Behavior: Behavior normal. Behavior is cooperative. Thought Content: Thought content normal.         Cognition and Memory: Cognition and memory normal.         Judgment: Judgment normal.      /64 (Site: Left Upper Arm, Position: Sitting, Cuff Size: Medium Adult)   Pulse 93   Temp 98.1 °F (36.7 °C) (Temporal)   Wt 99 lb (44.9 kg)   SpO2 99%   BMI 18.71 kg/m²      ASSESSMENT:     Diagnosis Orders   1. Pyelonephritis-suspected CBC with Auto Differential    Comprehensive Metabolic Panel    C-Reactive Protein    Urinalysis with Reflex to Culture    Culture, Urine    COVID-19    Pregnancy, Urine    cefTRIAXone (ROCEPHIN) 1,000 mg in lidocaine 1 % 2.86 mL IM Injection    cefdinir (OMNICEF) 300 MG capsule    CT ABDOMEN PELVIS W WO CONTRAST Additional Contrast? None           PLAN:    1. Rocephin. Blood work.   CT.  ER if worsen

## 2022-09-28 LAB — URINE CULTURE, ROUTINE: NORMAL

## 2022-09-30 ENCOUNTER — HOSPITAL ENCOUNTER (OUTPATIENT)
Dept: GENERAL RADIOLOGY | Age: 27
Discharge: HOME | End: 2022-09-30
Payer: MEDICAID

## 2022-09-30 DIAGNOSIS — N12 PYELONEPHRITIS: ICD-10-CM

## 2022-09-30 PROCEDURE — 6360000004 HC RX CONTRAST MEDICATION: Performed by: FAMILY MEDICINE

## 2022-09-30 PROCEDURE — 74178 CT ABD&PLV WO CNTR FLWD CNTR: CPT

## 2022-09-30 RX ADMIN — IOPAMIDOL 75 ML: 755 INJECTION, SOLUTION INTRAVENOUS at 10:22

## 2022-10-06 ENCOUNTER — OFFICE VISIT (OUTPATIENT)
Dept: FAMILY MEDICINE CLINIC | Age: 27
End: 2022-10-06
Payer: MEDICAID

## 2022-10-06 VITALS
WEIGHT: 102 LBS | HEART RATE: 145 BPM | HEIGHT: 61 IN | OXYGEN SATURATION: 100 % | TEMPERATURE: 98.1 F | SYSTOLIC BLOOD PRESSURE: 110 MMHG | DIASTOLIC BLOOD PRESSURE: 68 MMHG | BODY MASS INDEX: 19.26 KG/M2

## 2022-10-06 DIAGNOSIS — R50.9 FEVER, UNSPECIFIED FEVER CAUSE: ICD-10-CM

## 2022-10-06 DIAGNOSIS — R51.9 ACUTE NONINTRACTABLE HEADACHE, UNSPECIFIED HEADACHE TYPE: ICD-10-CM

## 2022-10-06 DIAGNOSIS — R05.1 ACUTE COUGH: Primary | ICD-10-CM

## 2022-10-06 LAB — SARS-COV-2, PCR: NOT DETECTED

## 2022-10-06 PROCEDURE — 99213 OFFICE O/P EST LOW 20 MIN: CPT | Performed by: NURSE PRACTITIONER

## 2022-10-06 RX ORDER — PREDNISONE 20 MG/1
20 TABLET ORAL 2 TIMES DAILY
Qty: 10 TABLET | Refills: 0 | Status: SHIPPED | OUTPATIENT
Start: 2022-10-06 | End: 2022-10-11

## 2022-10-06 RX ORDER — ALBUTEROL SULFATE 90 UG/1
2 AEROSOL, METERED RESPIRATORY (INHALATION) 4 TIMES DAILY PRN
Qty: 18 G | Refills: 0 | Status: SHIPPED | OUTPATIENT
Start: 2022-10-06

## 2022-10-06 ASSESSMENT — ENCOUNTER SYMPTOMS
COUGH: 1
SORE THROAT: 1
SHORTNESS OF BREATH: 1
RHINORRHEA: 1

## 2022-10-06 NOTE — PROGRESS NOTES
SUBJECTIVE:  COVID symptoms   Patient ID: Bessie Mendez is a 27 y.o. female. HPI:   HPI   Yesterday start feeling bad with sore throat a bit of cough body aches fatigue and runny nose. Fever low grade. Headache is the worse  Live in the home with Positive COVID  parents. Past Medical History:   Diagnosis Date    Allergic rhinitis       Prior to Visit Medications    Medication Sig Taking? Authorizing Provider   predniSONE (DELTASONE) 20 MG tablet Take 1 tablet by mouth 2 times daily for 5 days Yes GENARO Finn   albuterol sulfate HFA (VENTOLIN HFA) 108 (90 Base) MCG/ACT inhaler Inhale 2 puffs into the lungs 4 times daily as needed for Wheezing Yes GENARO Finn   cefdinir (OMNICEF) 300 MG capsule Take 1 capsule by mouth 2 times daily for 10 days Yes Andrea Kumar MD   pantoprazole (PROTONIX) 40 MG tablet Take 1 tablet by mouth daily Take 40 mg by mouth daily Yes Andrea Kumar MD   dicyclomine (BENTYL) 20 MG tablet Take 1 tablet by mouth in the morning and 1 tablet at noon and 1 tablet in the evening and 1 tablet before bedtime. Yes Andrea Kumar MD   topiramate (TOPAMAX) 50 MG tablet Take 1 tablet in the AM and 2 tablets in the PM Yes Historical Provider, MD   QUEtiapine (SEROQUEL) 25 MG tablet Take 25 mg by mouth at bedtime  Yes Historical Provider, MD   ALPRAZolam Bryan Murcia) 1 MG tablet Take 1 mg by mouth 3 times daily as needed.  Yes Historical Provider, MD     Allergies   Allergen Reactions    Latex Rash     Latex RAST test results 11/30/2018=  <0.1 kU/L-no significant reactivity detected    Penicillins Rash, Hives, Nausea And Vomiting, Palpitations and Shortness Of Breath    Amitriptyline     Dicyclomine Other (See Comments)     Heart rate increases    Paxil [Paroxetine Hcl] Other (See Comments)     Suicidal and worsen depression, work very on panic attacks    Desyrel [Trazodone] Nausea And Vomiting    Zoloft [Sertraline Hcl] Nausea And Vomiting       Review of Systems Constitutional:  Positive for diaphoresis, fatigue and fever. HENT:  Positive for rhinorrhea and sore throat. Respiratory:  Positive for cough and shortness of breath. Musculoskeletal:  Positive for myalgias. Neurological:  Positive for headaches. OBJECTIVE:    Physical Exam  Constitutional:       Appearance: She is well-developed. HENT:      Head: Normocephalic and atraumatic. Right Ear: Tympanic membrane, ear canal and external ear normal. No drainage or tenderness. No middle ear effusion. There is no impacted cerumen. Tympanic membrane is not injected or bulging. Left Ear: Tympanic membrane, ear canal and external ear normal. No drainage or tenderness. No middle ear effusion. There is no impacted cerumen. Tympanic membrane is not injected or bulging. Nose: Nose normal. No congestion or rhinorrhea. Right Sinus: No maxillary sinus tenderness or frontal sinus tenderness. Left Sinus: No maxillary sinus tenderness or frontal sinus tenderness. Mouth/Throat:      Lips: Pink. Mouth: Mucous membranes are moist. No oral lesions. Dentition: No gum lesions. Pharynx: Oropharyngeal exudate and posterior oropharyngeal erythema present. No uvula swelling. Tonsils: No tonsillar exudate or tonsillar abscesses. Eyes:      General: Lids are normal.         Right eye: No discharge. Left eye: No discharge. Extraocular Movements: Extraocular movements intact. Conjunctiva/sclera: Conjunctivae normal.      Right eye: Right conjunctiva is not injected. No exudate. Left eye: Left conjunctiva is not injected. No exudate. Cardiovascular:      Rate and Rhythm: Normal rate and regular rhythm. Heart sounds: Normal heart sounds. No murmur heard. Pulmonary:      Effort: Pulmonary effort is normal. No respiratory distress. Breath sounds: Normal breath sounds. No decreased breath sounds, wheezing, rhonchi or rales.    Abdominal:      General: Bowel sounds are normal.      Palpations: Abdomen is soft. Musculoskeletal:         General: Normal range of motion. Cervical back: Normal range of motion and neck supple. No rigidity. Normal range of motion. Right lower leg: No edema. Left lower leg: No edema. Lymphadenopathy:      Cervical: No cervical adenopathy. Right cervical: No superficial cervical adenopathy. Left cervical: No superficial cervical adenopathy. Skin:     General: Skin is warm and dry. Coloration: Skin is not pale. Neurological:      Mental Status: She is alert and oriented to person, place, and time. Psychiatric:         Attention and Perception: Attention normal.         Mood and Affect: Mood normal.         Behavior: Behavior normal. Behavior is cooperative. /68 (Site: Left Upper Arm, Position: Sitting, Cuff Size: Large Adult)   Pulse (!) 145   Temp 98.1 °F (36.7 °C) (Temporal)   Ht 5' 1\" (1.549 m)   Wt 102 lb (46.3 kg)   SpO2 100%   BMI 19.27 kg/m²      ASSESSMENT:      ICD-10-CM    1. Acute cough  R05.1 predniSONE (DELTASONE) 20 MG tablet     albuterol sulfate HFA (VENTOLIN HFA) 108 (90 Base) MCG/ACT inhaler     COVID-19     CANCELED: COVID-19      2. Acute nonintractable headache, unspecified headache type  R51.9 predniSONE (DELTASONE) 20 MG tablet     albuterol sulfate HFA (VENTOLIN HFA) 108 (90 Base) MCG/ACT inhaler     COVID-19     CANCELED: COVID-19      3. Fever, unspecified fever cause  R50.9 COVID-19     CANCELED: COVID-19          PLAN:    Thomas Ibanez: Pharyngitis (Patient is here today with sore throat and chills, her mom and dad tested positive for covid on Monday and she started feeling bad yesterday ), Cough, and Chills      Tylenol for fever  Push fluids. RTC for no improvement.

## 2022-10-07 DIAGNOSIS — N94.9 ADNEXAL CYST: Primary | ICD-10-CM

## 2022-10-10 ENCOUNTER — HOSPITAL ENCOUNTER (OUTPATIENT)
Dept: GENERAL RADIOLOGY | Age: 27
Discharge: HOME | End: 2022-10-10
Payer: MEDICAID

## 2022-10-10 DIAGNOSIS — N94.9 ADNEXAL CYST: ICD-10-CM

## 2022-10-10 PROCEDURE — 76856 US EXAM PELVIC COMPLETE: CPT | Performed by: RADIOLOGY

## 2022-10-10 PROCEDURE — 76830 TRANSVAGINAL US NON-OB: CPT

## 2022-10-10 PROCEDURE — 76830 TRANSVAGINAL US NON-OB: CPT | Performed by: RADIOLOGY

## 2022-10-13 ENCOUNTER — TELEPHONE (OUTPATIENT)
Dept: OBGYN CLINIC | Age: 27
End: 2022-10-13

## 2022-10-13 NOTE — TELEPHONE ENCOUNTER
Ela Kc mother called requesting new patient appointment. Stated PCP ordered ultrasound shows cyst on ovaries, enlarged ovary etc. Requesting referral/ results to be sent to office. [facility] not able to accommodate an appointment any time soon.      Please return her call to discuss anytime [phone]      Thank you

## 2022-11-14 DIAGNOSIS — R30.0 DYSURIA: Primary | ICD-10-CM

## 2022-12-05 ENCOUNTER — OFFICE VISIT (OUTPATIENT)
Dept: FAMILY MEDICINE CLINIC | Age: 27
End: 2022-12-05
Payer: MEDICAID

## 2022-12-05 VITALS
SYSTOLIC BLOOD PRESSURE: 98 MMHG | DIASTOLIC BLOOD PRESSURE: 62 MMHG | HEIGHT: 61 IN | WEIGHT: 105.6 LBS | HEART RATE: 120 BPM | TEMPERATURE: 98.2 F | OXYGEN SATURATION: 99 % | BODY MASS INDEX: 19.94 KG/M2 | RESPIRATION RATE: 20 BRPM

## 2022-12-05 DIAGNOSIS — R30.0 DYSURIA: ICD-10-CM

## 2022-12-05 DIAGNOSIS — N30.00 ACUTE CYSTITIS WITHOUT HEMATURIA: ICD-10-CM

## 2022-12-05 DIAGNOSIS — R30.0 DYSURIA: Primary | ICD-10-CM

## 2022-12-05 PROCEDURE — 99213 OFFICE O/P EST LOW 20 MIN: CPT | Performed by: NURSE PRACTITIONER

## 2022-12-05 RX ORDER — CEFDINIR 300 MG/1
300 CAPSULE ORAL 2 TIMES DAILY
Qty: 20 CAPSULE | Refills: 0 | Status: SHIPPED | OUTPATIENT
Start: 2022-12-05 | End: 2022-12-15

## 2022-12-05 RX ORDER — TRAMADOL HYDROCHLORIDE 50 MG/1
50 TABLET ORAL EVERY 6 HOURS PRN
COMMUNITY

## 2022-12-05 ASSESSMENT — ENCOUNTER SYMPTOMS: BACK PAIN: 1

## 2022-12-05 NOTE — PROGRESS NOTES
SUBJECTIVE:  Regular UTI  Patient ID: Nitin Joseph is a 24 y.o. female. HPI:   Urinary Tract Infection     Started 2 days ago. Burning , at the end feels like she hasn't emptied her bladder. Back pain. Lower right Nauseated  No fever or vomiting   Using AZO for 2 days   history of Pyelonephritis states doesn't feel the same. Past Medical History:   Diagnosis Date    Allergic rhinitis       Prior to Visit Medications    Medication Sig Taking? Authorizing Provider   traMADol (ULTRAM) 50 MG tablet Take 50 mg by mouth every 6 hours as needed for Pain. Yes Historical Provider, MD   cefdinir (OMNICEF) 300 MG capsule Take 1 capsule by mouth 2 times daily for 10 days Yes GENARO Qureshi   pantoprazole (PROTONIX) 40 MG tablet Take 1 tablet by mouth daily Take 40 mg by mouth daily Yes Beryle Harper, MD   dicyclomine (BENTYL) 20 MG tablet Take 1 tablet by mouth in the morning and 1 tablet at noon and 1 tablet in the evening and 1 tablet before bedtime. Yes Beryle Harper, MD   topiramate (TOPAMAX) 50 MG tablet Take 1 tablet in the AM and 2 tablets in the PM Yes Historical Provider, MD   QUEtiapine (SEROQUEL) 25 MG tablet Take 25 mg by mouth at bedtime  Yes Historical Provider, MD   ALPRAZolam Salena Barks) 1 MG tablet Take 1 mg by mouth 3 times daily as needed. Yes Historical Provider, MD      Allergies   Allergen Reactions    Latex Rash     Latex RAST test results 11/30/2018=  <0.1 kU/L-no significant reactivity detected    Penicillins Rash, Hives, Nausea And Vomiting, Palpitations and Shortness Of Breath    Amitriptyline     Dicyclomine Other (See Comments)     Heart rate increases    Paxil [Paroxetine Hcl] Other (See Comments)     Suicidal and worsen depression, work very on panic attacks    Desyrel [Trazodone] Nausea And Vomiting    Zoloft [Sertraline Hcl] Nausea And Vomiting       Review of Systems   Constitutional:  Negative for fever.    Genitourinary:  Positive for decreased urine volume, difficulty urinating, dysuria and urgency. Musculoskeletal:  Positive for back pain. Neurological:  Negative for dizziness and headaches. OBJECTIVE:    Physical Exam  Constitutional:       Appearance: Normal appearance. She is well-developed. She is not ill-appearing. HENT:      Head: Normocephalic and atraumatic. Right Ear: External ear normal.      Left Ear: External ear normal.      Nose: Nose normal.      Mouth/Throat:      Lips: Pink. Mouth: Mucous membranes are moist.   Eyes:      General: Lids are normal.      Extraocular Movements: Extraocular movements intact. Conjunctiva/sclera: Conjunctivae normal.   Cardiovascular:      Rate and Rhythm: Normal rate and regular rhythm. Heart sounds: Normal heart sounds. No murmur heard. Pulmonary:      Effort: Pulmonary effort is normal.      Breath sounds: Normal breath sounds. Musculoskeletal:      Cervical back: Normal range of motion. Skin:     General: Skin is warm and dry. Neurological:      Mental Status: She is alert and oriented to person, place, and time. Motor: No weakness or tremor. Coordination: Coordination is intact. Psychiatric:         Attention and Perception: Attention and perception normal.         Mood and Affect: Mood normal.         Speech: Speech normal.         Behavior: Behavior normal. Behavior is cooperative. Thought Content: Thought content normal.         Cognition and Memory: Cognition and memory normal.         Judgment: Judgment normal.      BP 98/62 (Site: Right Upper Arm, Position: Sitting, Cuff Size: Small Adult)   Pulse (!) 120   Temp 98.2 °F (36.8 °C) (Temporal)   Resp 20   Ht 5' 1\" (1.549 m)   Wt 105 lb 9.6 oz (47.9 kg)   SpO2 99%   BMI 19.95 kg/m²      ASSESSMENT:      ICD-10-CM    1. Dysuria  R30.0 CANCELED: Urinalysis with Reflex to Culture      2.  Acute cystitis without hematuria  N30.00 cefdinir (OMNICEF) 300 MG capsule          PLAN:    Mackenzie Phlegm: Urinary Tract Infection (X2 days the at home test was positive for UTI)  Review urine culture     Diagnosis and orders for this visit are above.

## 2022-12-07 LAB
ORGANISM: ABNORMAL
URINE CULTURE, ROUTINE: ABNORMAL
URINE CULTURE, ROUTINE: ABNORMAL

## 2023-01-17 ENCOUNTER — OFFICE VISIT (OUTPATIENT)
Dept: FAMILY MEDICINE CLINIC | Age: 28
End: 2023-01-17
Payer: MEDICAID

## 2023-01-17 VITALS
SYSTOLIC BLOOD PRESSURE: 118 MMHG | WEIGHT: 104 LBS | HEIGHT: 61 IN | DIASTOLIC BLOOD PRESSURE: 64 MMHG | TEMPERATURE: 98 F | HEART RATE: 111 BPM | BODY MASS INDEX: 19.63 KG/M2 | OXYGEN SATURATION: 98 %

## 2023-01-17 DIAGNOSIS — R51.9 NONINTRACTABLE HEADACHE, UNSPECIFIED CHRONICITY PATTERN, UNSPECIFIED HEADACHE TYPE: ICD-10-CM

## 2023-01-17 DIAGNOSIS — F32.3 MAJOR DEPRESSION WITH PSYCHOTIC FEATURES (HCC): Primary | ICD-10-CM

## 2023-01-17 DIAGNOSIS — F41.0 PANIC ATTACK: ICD-10-CM

## 2023-01-17 DIAGNOSIS — M79.7 FIBROMYALGIA: ICD-10-CM

## 2023-01-17 PROCEDURE — 99214 OFFICE O/P EST MOD 30 MIN: CPT | Performed by: FAMILY MEDICINE

## 2023-01-17 RX ORDER — ALPRAZOLAM 1 MG/1
1 TABLET ORAL 3 TIMES DAILY PRN
Qty: 30 TABLET | Refills: 2 | Status: SHIPPED | OUTPATIENT
Start: 2023-01-17 | End: 2023-02-16

## 2023-01-17 RX ORDER — ALPRAZOLAM 1 MG/1
1 TABLET ORAL 3 TIMES DAILY PRN
Status: CANCELLED | OUTPATIENT
Start: 2023-01-17

## 2023-01-17 RX ORDER — QUETIAPINE FUMARATE 300 MG/1
300 TABLET, FILM COATED ORAL
Qty: 30 TABLET | Refills: 5 | Status: SHIPPED | OUTPATIENT
Start: 2023-01-17

## 2023-01-17 RX ORDER — QUETIAPINE FUMARATE 25 MG/1
25 TABLET, FILM COATED ORAL NIGHTLY
Status: CANCELLED | OUTPATIENT
Start: 2023-01-17

## 2023-01-17 RX ORDER — TRAMADOL HYDROCHLORIDE 50 MG/1
50 TABLET ORAL EVERY 6 HOURS PRN
Status: CANCELLED | OUTPATIENT
Start: 2023-01-17

## 2023-01-17 RX ORDER — TRAMADOL HYDROCHLORIDE 50 MG/1
50 TABLET ORAL EVERY 6 HOURS PRN
Qty: 30 TABLET | Refills: 0 | Status: SHIPPED | OUTPATIENT
Start: 2023-01-17 | End: 2023-02-16

## 2023-01-17 ASSESSMENT — ENCOUNTER SYMPTOMS
RESPIRATORY NEGATIVE: 1
EYES NEGATIVE: 1
ALLERGIC/IMMUNOLOGIC NEGATIVE: 1
GASTROINTESTINAL NEGATIVE: 1

## 2023-01-17 ASSESSMENT — PATIENT HEALTH QUESTIONNAIRE - PHQ9
SUM OF ALL RESPONSES TO PHQ QUESTIONS 1-9: 0
SUM OF ALL RESPONSES TO PHQ9 QUESTIONS 1 & 2: 0
SUM OF ALL RESPONSES TO PHQ QUESTIONS 1-9: 0
2. FEELING DOWN, DEPRESSED OR HOPELESS: 0
1. LITTLE INTEREST OR PLEASURE IN DOING THINGS: 0

## 2023-01-17 NOTE — PROGRESS NOTES
SUBJECTIVE:    Patient ID: Tevin Nvaa is a 24 y. o.female. HPI:   Here for follow-up of multiple medical problems. Patient is a 81-year-old female. She have history of depression with psychotic features. She was given Seroquel 400 at bedtime. She been taking only 200. She thinks to 400 too much but 300 she can tolerates. She would like to increase the dose. When she take the medication she have very little psychotic features. She said that she only hears voices very rarely. She wonders she can increase her dose slightly. Psychotic features includes hallucinations both auditory and visual.  No suicidal homicidal.  She also have fibromyalgia. Takes tramadol. Medication is helpful. She takes only as needed. She takes Xanax for panic attack as needed. Medication is helpful. Denies medication side effect. She also have history of migraines. She have used multiple medications in the past without success. She does not know what else to do. Past Medical History:   Diagnosis Date    Allergic rhinitis       Current Outpatient Medications   Medication Sig Dispense Refill    traMADol (ULTRAM) 50 MG tablet Take 1 tablet by mouth every 6 hours as needed for Pain for up to 30 days. 30 tablet 0    ALPRAZolam (XANAX) 1 MG tablet Take 1 tablet by mouth 3 times daily as needed for Sleep for up to 30 days. 30 tablet 2    QUEtiapine (SEROQUEL) 300 MG tablet Take 1 tablet by mouth nightly 30 tablet 5    traMADol (ULTRAM) 50 MG tablet Take 50 mg by mouth every 6 hours as needed for Pain.      pantoprazole (PROTONIX) 40 MG tablet Take 1 tablet by mouth daily Take 40 mg by mouth daily 30 tablet 5    dicyclomine (BENTYL) 20 MG tablet Take 1 tablet by mouth in the morning and 1 tablet at noon and 1 tablet in the evening and 1 tablet before bedtime.  120 tablet 5    topiramate (TOPAMAX) 50 MG tablet Take 1 tablet in the AM and 2 tablets in the PM      ALPRAZolam (XANAX) 1 MG tablet Take 1 mg by mouth 3 times daily as needed. No current facility-administered medications for this visit. Allergies   Allergen Reactions    Latex Rash     Latex RAST test results 11/30/2018=  <0.1 kU/L-no significant reactivity detected    Penicillins Rash, Hives, Nausea And Vomiting, Palpitations and Shortness Of Breath    Amitriptyline     Dicyclomine Other (See Comments)     Heart rate increases    Paxil [Paroxetine Hcl] Other (See Comments)     Suicidal and worsen depression, work very on panic attacks    Desyrel [Trazodone] Nausea And Vomiting    Zoloft [Sertraline Hcl] Nausea And Vomiting       Review of Systems   Constitutional: Negative. HENT: Negative. Eyes: Negative. Respiratory: Negative. Cardiovascular: Negative. Gastrointestinal: Negative. Endocrine: Negative. Genitourinary: Negative. Musculoskeletal: Negative. Skin: Negative. Allergic/Immunologic: Negative. Neurological: Negative. Hematological: Negative. Psychiatric/Behavioral: Negative. OBJECTIVE:    Physical Exam  Constitutional:       Appearance: Normal appearance. She is well-developed and well-groomed. HENT:      Head: Normocephalic and atraumatic. Right Ear: Tympanic membrane, ear canal and external ear normal. There is no impacted cerumen. Left Ear: Tympanic membrane, ear canal and external ear normal. There is no impacted cerumen. Nose: Nose normal.      Mouth/Throat:      Lips: Pink. Mouth: Mucous membranes are moist.      Dentition: Normal dentition. Pharynx: Oropharynx is clear. Uvula midline. Eyes:      General: Lids are normal.         Right eye: No discharge. Left eye: No discharge. Extraocular Movements: Extraocular movements intact. Conjunctiva/sclera: Conjunctivae normal.      Right eye: Right conjunctiva is not injected. Left eye: Left conjunctiva is not injected. Pupils: Pupils are equal, round, and reactive to light.    Neck:      Thyroid: No thyromegaly. Vascular: No carotid bruit or JVD. Cardiovascular:      Rate and Rhythm: Normal rate and regular rhythm. Pulses: Normal pulses. Carotid pulses are 2+ on the right side and 2+ on the left side. Radial pulses are 2+ on the right side and 2+ on the left side. Heart sounds: Normal heart sounds, S1 normal and S2 normal. No murmur heard. Pulmonary:      Effort: Pulmonary effort is normal.      Breath sounds: Normal breath sounds. Abdominal:      General: Bowel sounds are normal. There is no distension or abdominal bruit. Palpations: Abdomen is soft. There is no mass. Hernia: No hernia is present. Musculoskeletal:         General: Normal range of motion. Cervical back: Full passive range of motion without pain, normal range of motion and neck supple. Right lower leg: No edema. Left lower leg: No edema. Lymphadenopathy:      Cervical: No cervical adenopathy. Right cervical: No superficial cervical adenopathy. Left cervical: No superficial cervical adenopathy. Upper Body:      Right upper body: No supraclavicular adenopathy. Left upper body: No supraclavicular adenopathy. Skin:     General: Skin is warm and dry. Coloration: Skin is not pale. Findings: No lesion or rash. Nails: There is no clubbing. Neurological:      Mental Status: She is alert and oriented to person, place, and time. Motor: No weakness or tremor. Coordination: Coordination normal.      Deep Tendon Reflexes: Reflexes are normal and symmetric. Psychiatric:         Attention and Perception: Attention normal.         Mood and Affect: Mood normal.         Speech: Speech normal.         Behavior: Behavior normal. Behavior is cooperative. Thought Content:  Thought content normal.         Cognition and Memory: Cognition and memory normal.         Judgment: Judgment normal.      /64 (Site: Left Upper Arm, Position: Sitting, Cuff Size: Medium Adult)   Pulse (!) 111   Temp 98 °F (36.7 °C) (Temporal)   Ht 5' 1\" (1.549 m)   Wt 104 lb (47.2 kg)   SpO2 98%   BMI 19.65 kg/m²      ASSESSMENT:     Diagnosis Orders   1. Major depression with psychotic features (HCC)-fair control QUEtiapine (SEROQUEL) 300 MG tablet      2. Fibromyalgia-stable-stable traMADol (ULTRAM) 50 MG tablet      3. Panic attack-stable ALPRAZolam (XANAX) 1 MG tablet      4. Nonintractable headache, unspecified chronicity pattern, unspecified headache type-not controlled Pedro Pablo Stephenson MD, Neurology, [place]           PLAN:    1. Increase Seroquel to 300. Follow-up in 2 weeks if not better or in 3 months if better  2. Jinx File continue medication  3. Jinx File and continue medication  4. Refer to neurology.   Follow-up 3 months

## 2023-01-19 ENCOUNTER — NURSE ONLY (OUTPATIENT)
Dept: FAMILY MEDICINE CLINIC | Age: 28
End: 2023-01-19

## 2023-01-19 ENCOUNTER — TELEPHONE (OUTPATIENT)
Dept: FAMILY MEDICINE CLINIC | Age: 28
End: 2023-01-19

## 2023-01-19 DIAGNOSIS — R51.9 NONINTRACTABLE HEADACHE, UNSPECIFIED CHRONICITY PATTERN, UNSPECIFIED HEADACHE TYPE: Primary | ICD-10-CM

## 2023-01-19 RX ORDER — KETOROLAC TROMETHAMINE 30 MG/ML
30 INJECTION, SOLUTION INTRAMUSCULAR; INTRAVENOUS ONCE
Status: COMPLETED | OUTPATIENT
Start: 2023-01-19 | End: 2023-01-19

## 2023-01-19 RX ORDER — PROMETHAZINE HYDROCHLORIDE 25 MG/ML
25 INJECTION, SOLUTION INTRAMUSCULAR; INTRAVENOUS ONCE
Status: COMPLETED | OUTPATIENT
Start: 2023-01-19 | End: 2023-01-19

## 2023-01-19 RX ADMIN — PROMETHAZINE HYDROCHLORIDE 25 MG: 25 INJECTION, SOLUTION INTRAMUSCULAR; INTRAVENOUS at 11:56

## 2023-01-19 RX ADMIN — KETOROLAC TROMETHAMINE 30 MG: 30 INJECTION, SOLUTION INTRAMUSCULAR; INTRAVENOUS at 11:57

## 2023-01-19 NOTE — TELEPHONE ENCOUNTER
Mom called stating patient has had a headache since Tuesday and it is not any better. She is inquiring about a shot or something to help her get rid of it. Please advise.

## 2023-01-26 ENCOUNTER — OFFICE VISIT (OUTPATIENT)
Dept: FAMILY MEDICINE CLINIC | Age: 28
End: 2023-01-26

## 2023-01-26 VITALS
HEIGHT: 61 IN | SYSTOLIC BLOOD PRESSURE: 112 MMHG | TEMPERATURE: 97.5 F | DIASTOLIC BLOOD PRESSURE: 68 MMHG | BODY MASS INDEX: 20.2 KG/M2 | WEIGHT: 107 LBS | OXYGEN SATURATION: 99 % | HEART RATE: 118 BPM

## 2023-01-26 DIAGNOSIS — R51.9 NONINTRACTABLE HEADACHE, UNSPECIFIED CHRONICITY PATTERN, UNSPECIFIED HEADACHE TYPE: Primary | ICD-10-CM

## 2023-01-26 RX ORDER — BUTALBITAL, ASPIRIN, AND CAFFEINE 325; 50; 40 MG/1; MG/1; MG/1
1 CAPSULE ORAL EVERY 4 HOURS PRN
Qty: 15 CAPSULE | Refills: 1 | Status: SHIPPED | OUTPATIENT
Start: 2023-01-26 | End: 2023-02-25

## 2023-01-26 RX ORDER — PROMETHAZINE HYDROCHLORIDE 25 MG/ML
25 INJECTION, SOLUTION INTRAMUSCULAR; INTRAVENOUS EVERY 6 HOURS PRN
Status: SHIPPED | OUTPATIENT
Start: 2023-01-26

## 2023-01-26 RX ORDER — KETOROLAC TROMETHAMINE 30 MG/ML
30 INJECTION, SOLUTION INTRAMUSCULAR; INTRAVENOUS ONCE
Status: COMPLETED | OUTPATIENT
Start: 2023-01-26 | End: 2023-01-26

## 2023-01-26 RX ADMIN — KETOROLAC TROMETHAMINE 30 MG: 30 INJECTION, SOLUTION INTRAMUSCULAR; INTRAVENOUS at 13:20

## 2023-01-26 RX ADMIN — PROMETHAZINE HYDROCHLORIDE 25 MG: 25 INJECTION, SOLUTION INTRAMUSCULAR; INTRAVENOUS at 13:18

## 2023-01-26 NOTE — LETTER
[facility] [address]  [place] [address]  Phone: [phone]  Fax: [phone]    Valente Le MD        January 26, 2023     Patient: Sherie Renae   YOB: 1995   Date of Visit: 1/26/2023       To Whom it May Concern:    Sherie Renae was seen in my clinic on 1/26/2023. She may return to work on 1/27/2023.    If you have any questions or concerns, please don't hesitate to call.    Sincerely,         Valente Le MD

## 2023-01-26 NOTE — PROGRESS NOTES
SUBJECTIVE:    Patient ID: Suzette Jeff is a 24 y. o.female. HPI:   Here for evaluation of headaches. Patient is a 21-year-old female. She have headaches. Headaches are in the frontal area. She have this diagnosis of migraines. She failed to respond to any triptan. She failed to respond to Vance. Fioricet provide relief. She was given a Toradol and for milligrams shot earlier this week that improved for a couple days. She have an appointment to see neurology in a couple of weeks. Past Medical History:   Diagnosis Date    Allergic rhinitis       Current Outpatient Medications   Medication Sig Dispense Refill    butalbital-aspirin-caffeine (FIORINAL) -40 MG capsule Take 1 capsule by mouth every 4 hours as needed for Headaches for up to 30 days. Max Daily Amount: 6 capsules 15 capsule 1    traMADol (ULTRAM) 50 MG tablet Take 1 tablet by mouth every 6 hours as needed for Pain for up to 30 days. 30 tablet 0    ALPRAZolam (XANAX) 1 MG tablet Take 1 tablet by mouth 3 times daily as needed for Sleep for up to 30 days. 30 tablet 2    QUEtiapine (SEROQUEL) 300 MG tablet Take 1 tablet by mouth nightly 30 tablet 5    traMADol (ULTRAM) 50 MG tablet Take 50 mg by mouth every 6 hours as needed for Pain.      pantoprazole (PROTONIX) 40 MG tablet Take 1 tablet by mouth daily Take 40 mg by mouth daily 30 tablet 5    dicyclomine (BENTYL) 20 MG tablet Take 1 tablet by mouth in the morning and 1 tablet at noon and 1 tablet in the evening and 1 tablet before bedtime. 120 tablet 5    topiramate (TOPAMAX) 50 MG tablet Take 1 tablet in the AM and 2 tablets in the PM      ALPRAZolam (XANAX) 1 MG tablet Take 1 mg by mouth 3 times daily as needed.        Current Facility-Administered Medications   Medication Dose Route Frequency Provider Last Rate Last Admin    ketorolac (TORADOL) injection 30 mg  30 mg IntraVENous Once Jamey Godinez MD        promethazine (PHENERGAN) injection 25 mg  25 mg IntraMUSCular Q6H PRN Jose [place] Eleuterio Martinez MD          Allergies   Allergen Reactions    Latex Rash     Latex RAST test results 11/30/2018=  <0.1 kU/L-no significant reactivity detected    Penicillins Rash, Hives, Nausea And Vomiting, Palpitations and Shortness Of Breath    Amitriptyline     Dicyclomine Other (See Comments)     Heart rate increases    Paxil [Paroxetine Hcl] Other (See Comments)     Suicidal and worsen depression, work very on panic attacks    Desyrel [Trazodone] Nausea And Vomiting    Zoloft [Sertraline Hcl] Nausea And Vomiting       Review of Systems   Constitutional: Negative. HENT: Negative. Eyes: Negative. Respiratory: Negative. Cardiovascular: Negative. Gastrointestinal: Negative. Endocrine: Negative. Genitourinary: Negative. Musculoskeletal: Negative. Skin: Negative. Allergic/Immunologic: Negative. Neurological: Negative. Hematological: Negative. Psychiatric/Behavioral: Negative. OBJECTIVE:    Physical Exam  Constitutional:       Appearance: Normal appearance. She is well-developed and well-groomed. HENT:      Head: Normocephalic and atraumatic. Right Ear: Tympanic membrane, ear canal and external ear normal. There is no impacted cerumen. Left Ear: Tympanic membrane, ear canal and external ear normal. There is no impacted cerumen. Nose: Nose normal.      Mouth/Throat:      Lips: Pink. Mouth: Mucous membranes are moist.      Dentition: Normal dentition. Pharynx: Oropharynx is clear. Uvula midline. Eyes:      General: Lids are normal.         Right eye: No discharge. Left eye: No discharge. Extraocular Movements: Extraocular movements intact. Conjunctiva/sclera: Conjunctivae normal.      Right eye: Right conjunctiva is not injected. Left eye: Left conjunctiva is not injected. Pupils: Pupils are equal, round, and reactive to light. Neck:      Thyroid: No thyromegaly. Vascular: No carotid bruit or JVD.    Cardiovascular: Rate and Rhythm: Normal rate and regular rhythm. Pulses: Normal pulses. Carotid pulses are 2+ on the right side and 2+ on the left side. Radial pulses are 2+ on the right side and 2+ on the left side. Heart sounds: Normal heart sounds, S1 normal and S2 normal. No murmur heard. Pulmonary:      Effort: Pulmonary effort is normal.      Breath sounds: Normal breath sounds. Abdominal:      General: Bowel sounds are normal. There is no distension or abdominal bruit. Palpations: Abdomen is soft. There is no mass. Hernia: No hernia is present. Musculoskeletal:         General: Normal range of motion. Cervical back: Full passive range of motion without pain, normal range of motion and neck supple. Right lower leg: No edema. Left lower leg: No edema. Lymphadenopathy:      Cervical: No cervical adenopathy. Right cervical: No superficial cervical adenopathy. Left cervical: No superficial cervical adenopathy. Upper Body:      Right upper body: No supraclavicular adenopathy. Left upper body: No supraclavicular adenopathy. Skin:     General: Skin is warm and dry. Coloration: Skin is not pale. Findings: No lesion or rash. Nails: There is no clubbing. Neurological:      Mental Status: She is alert and oriented to person, place, and time. Motor: No weakness or tremor. Coordination: Coordination normal.      Deep Tendon Reflexes: Reflexes are normal and symmetric. Psychiatric:         Attention and Perception: Attention normal.         Mood and Affect: Mood normal.         Speech: Speech normal.         Behavior: Behavior normal. Behavior is cooperative. Thought Content:  Thought content normal.         Cognition and Memory: Cognition and memory normal.         Judgment: Judgment normal.      /68 (Site: Left Upper Arm, Position: Sitting, Cuff Size: Medium Adult)   Pulse (!) 118   Temp 97.5 °F (36.4 °C) (Temporal) Ht 5' 1\" (1.549 m)   Wt 107 lb (48.5 kg)   SpO2 99%   BMI 20.22 kg/m²      ASSESSMENT:     Diagnosis Orders   1. Nonintractable headache, unspecified chronicity pattern, unspecified headache type-no control ketorolac (TORADOL) injection 30 mg    promethazine (PHENERGAN) injection 25 mg    butalbital-aspirin-caffeine (FIORINAL) -40 MG capsule           PLAN:    1. Toradol shot. Refill Fioricets.   Follow-up with neurology in a couple of weeks for different treatment options

## 2023-02-08 ENCOUNTER — OFFICE VISIT (OUTPATIENT)
Dept: NEUROLOGY | Age: 28
End: 2023-02-08

## 2023-02-08 VITALS
OXYGEN SATURATION: 99 % | HEART RATE: 87 BPM | DIASTOLIC BLOOD PRESSURE: 70 MMHG | HEIGHT: 61 IN | BODY MASS INDEX: 20.77 KG/M2 | SYSTOLIC BLOOD PRESSURE: 116 MMHG | WEIGHT: 110 LBS

## 2023-02-08 DIAGNOSIS — G44.009 CLUSTER HEADACHE, NOT INTRACTABLE, UNSPECIFIED CHRONICITY PATTERN: ICD-10-CM

## 2023-02-08 DIAGNOSIS — Z86.69 HISTORY OF CHIARI MALFORMATION: Primary | ICD-10-CM

## 2023-02-08 DIAGNOSIS — G43.109 MIGRAINE WITH AURA AND WITHOUT STATUS MIGRAINOSUS, NOT INTRACTABLE: ICD-10-CM

## 2023-02-08 DIAGNOSIS — Z79.899 MEDICATION MANAGEMENT: ICD-10-CM

## 2023-02-08 RX ORDER — RIMEGEPANT SULFATE 75 MG/75MG
TABLET, ORALLY DISINTEGRATING ORAL
Qty: 8 TABLET | Refills: 3 | Status: SHIPPED | OUTPATIENT
Start: 2023-02-08

## 2023-02-08 RX ORDER — LEVONORGESTREL 52 MG/1
INTRAUTERINE DEVICE INTRAUTERINE
COMMUNITY

## 2023-02-08 RX ORDER — BUTALBITAL, ACETAMINOPHEN AND CAFFEINE 300; 40; 50 MG/1; MG/1; MG/1
CAPSULE ORAL
COMMUNITY
Start: 2023-01-26 | End: 2023-02-08

## 2023-02-08 RX ORDER — ATOGEPANT 60 MG/1
60 TABLET ORAL DAILY
Qty: 30 TABLET | Refills: 3 | Status: SHIPPED | OUTPATIENT
Start: 2023-02-08

## 2023-02-08 NOTE — PROGRESS NOTES
REVIEW OF SYSTEMS    Constitutional: []Fever []Sweat []Chills [] Recent Injury [x] Denies all unless marked  HEENT:[x]Headache  [] Head Injury/Hearing Loss  [] Sore Throat  [] Ear Ache/Dizziness  [x] Denies all unless marked  Spine:  [x] Neck pain  [x] Back pain  [] Sciaticia  [x] Denies all unless marked  Cardiovascular:[]Heart Disease []Chest Pain [] Palpitations  [x] Denies all unless marked  Pulmonary: []Shortness of Breath []Cough   [x] Denies all unless marke  Gastrointestinal: [x]Nausea  []Vomiting  []Abdominal Pain  []Constipation  []Diarrhea  []Dark Bloody Stools  [x] Denies all unless marked  Psychiatric/Behavioral:[] Depression [] Anxiety [x] Denies all unless marked  Genitourinary:   [] Frequency  [] Urgency  [] Incontinence [] Pain with Urination  [x] Denies all unless marked  Extremities: []Pain  []Swelling  [x] Denies all unless marked  Musculoskeletal: [x] Muscle Pain  [] Joint Pain  [] Arthritis [] Muscle Cramps [] Muscle Twitches  [x] Denies all unless marked  Sleep: [x] Insomnia [] Snoring [] Restless Legs [] Sleep Apnea  [] Daytime Sleepiness  [x] Denies all unless marked  Skin:[] Rash [] Skin Discoloration [x] Denies all unless marked   Neurological: [x]Visual Disturbance/Memory Loss [] Loss of Balance [] Slurred Speech/Weakness [] Seizures  [] Vertigo/Dizziness [x] Denies all unless marked
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
## 2023-02-09 ENCOUNTER — TELEPHONE (OUTPATIENT)
Dept: NEUROLOGY | Age: 28
End: 2023-02-09

## 2023-02-09 RX ORDER — SUMATRIPTAN 6 MG/.5ML
6 INJECTION, SOLUTION SUBCUTANEOUS
Qty: 0.5 ML | Refills: 5 | Status: SHIPPED | OUTPATIENT
Start: 2023-02-09 | End: 2023-02-09

## 2023-02-09 NOTE — TELEPHONE ENCOUNTER
Called and spoke with Dr. Mariia Gaona office to request records. They voiced they would send them. Jose Francisco Martinez, GENARO - AHMET Hayeselor, MA  Can we try to get records on her? She used to see a Mariia Ricoman in [place]. She was apparently her neurologist at that time and not sure what year but it was several years ago.

## 2023-02-16 ENCOUNTER — CLINICAL DOCUMENTATION (OUTPATIENT)
Dept: NEUROLOGY | Age: 28
End: 2023-02-16

## 2023-02-16 NOTE — PROGRESS NOTES
FAWAD CANNON Case ID: [id number] - Rx #: [id number] help?  Call us at [phone]  Status  Sent to [address] [place] [address] 60MG tablets  Form  [address] [place] Medicaid ePA Form 2017 [facility]

## 2023-02-16 NOTE — PROGRESS NOTES
FAWAD ROMANO Key: [id number] - [place] Case ID: [id number] - Rx #: [id number] help? Call us at [phone]  Status  Sent to [facility]  Drug  Nurtec 75MG dispersible tablets  Form  [employer] [place] Medicaid ePA Form 2017 [employer]  Original Claim Info  75

## 2023-02-17 ENCOUNTER — OFFICE VISIT (OUTPATIENT)
Dept: FAMILY MEDICINE CLINIC | Age: 28
End: 2023-02-17
Payer: MEDICAID

## 2023-02-17 VITALS
SYSTOLIC BLOOD PRESSURE: 114 MMHG | TEMPERATURE: 98.2 F | HEART RATE: 94 BPM | RESPIRATION RATE: 17 BRPM | OXYGEN SATURATION: 99 % | BODY MASS INDEX: 20.52 KG/M2 | DIASTOLIC BLOOD PRESSURE: 72 MMHG | WEIGHT: 108.6 LBS

## 2023-02-17 DIAGNOSIS — R30.0 DYSURIA: ICD-10-CM

## 2023-02-17 DIAGNOSIS — N39.0 URINARY TRACT INFECTION WITHOUT HEMATURIA, SITE UNSPECIFIED: Primary | ICD-10-CM

## 2023-02-17 LAB
BACTERIA: ABNORMAL /HPF
BILIRUBIN URINE: NEGATIVE
BLOOD, URINE: NEGATIVE
CLARITY: ABNORMAL
COLOR: ABNORMAL
CRYSTALS, UA: ABNORMAL /HPF
EPITHELIAL CELLS, UA: 4 /HPF (ref 0–5)
GLUCOSE URINE: NEGATIVE MG/DL
HYALINE CASTS: 10 /HPF (ref 0–8)
KETONES, URINE: NEGATIVE MG/DL
LEUKOCYTE ESTERASE, URINE: NEGATIVE
NITRITE, URINE: POSITIVE
PH UA: 5.5 (ref 5–8)
PROTEIN UA: NEGATIVE MG/DL
RBC UA: 3 /HPF (ref 0–4)
SPECIFIC GRAVITY UA: >=1.03 (ref 1–1.03)
URINE TYPE: ABNORMAL
UROBILINOGEN, URINE: 0.2 E.U./DL
WBC UA: 7 /HPF (ref 0–5)

## 2023-02-17 PROCEDURE — 99213 OFFICE O/P EST LOW 20 MIN: CPT | Performed by: CLINICAL NURSE SPECIALIST

## 2023-02-17 RX ORDER — CEFDINIR 300 MG/1
300 CAPSULE ORAL 2 TIMES DAILY
Qty: 14 CAPSULE | Refills: 0 | Status: SHIPPED | OUTPATIENT
Start: 2023-02-17 | End: 2023-02-24

## 2023-02-17 ASSESSMENT — PATIENT HEALTH QUESTIONNAIRE - PHQ9
SUM OF ALL RESPONSES TO PHQ QUESTIONS 1-9: 13
SUM OF ALL RESPONSES TO PHQ QUESTIONS 1-9: 13
9. THOUGHTS THAT YOU WOULD BE BETTER OFF DEAD, OR OF HURTING YOURSELF: 1
10. IF YOU CHECKED OFF ANY PROBLEMS, HOW DIFFICULT HAVE THESE PROBLEMS MADE IT FOR YOU TO DO YOUR WORK, TAKE CARE OF THINGS AT HOME, OR GET ALONG WITH OTHER PEOPLE: 3
SUM OF ALL RESPONSES TO PHQ QUESTIONS 1-9: 13
4. FEELING TIRED OR HAVING LITTLE ENERGY: 3
1. LITTLE INTEREST OR PLEASURE IN DOING THINGS: 1
SUM OF ALL RESPONSES TO PHQ9 QUESTIONS 1 & 2: 2
2. FEELING DOWN, DEPRESSED OR HOPELESS: 1
SUM OF ALL RESPONSES TO PHQ QUESTIONS 1-9: 12
5. POOR APPETITE OR OVEREATING: 2
7. TROUBLE CONCENTRATING ON THINGS, SUCH AS READING THE NEWSPAPER OR WATCHING TELEVISION: 2
8. MOVING OR SPEAKING SO SLOWLY THAT OTHER PEOPLE COULD HAVE NOTICED. OR THE OPPOSITE, BEING SO FIGETY OR RESTLESS THAT YOU HAVE BEEN MOVING AROUND A LOT MORE THAN USUAL: 0
6. FEELING BAD ABOUT YOURSELF - OR THAT YOU ARE A FAILURE OR HAVE LET YOURSELF OR YOUR FAMILY DOWN: 0
3. TROUBLE FALLING OR STAYING ASLEEP: 3

## 2023-02-17 NOTE — PROGRESS NOTES
SUBJECTIVE:  Arsh Wilson is a 24 y.o. who presents today for Urinary Tract Infection (Noticed about a week ago)      HPI    Elspeth Dubin presents today for an acute visit. She has had symptoms for one week, worsening. She notes dysuria and foul odor to urine. There is lower abdominal pain, urine is cloudy and she has felt feverish. Relates nausea but no vomiting. Has tried to flush urine with water.  Has taken AZO prn.  UTI in December + ecoli, did well on cefdinir    Past Medical History:   Diagnosis Date    Allergic rhinitis      Past Surgical History:   Procedure Laterality Date    COLONOSCOPY  03/15/2012    Dr Umair Gerard  02/23/2014    Dr Ameya Quinn colitis (stool samples sent), cipro/flagyl    COLONOSCOPY N/A 05/18/2022    Dr Ollie Bautista, (-) Micro Colitis,  21 year recall    TONSILLECTOMY      TYMPANOSTOMY TUBE PLACEMENT  age 5    UPPER GASTROINTESTINAL ENDOSCOPY  05/11/2015    Dr Olivia Robert reflux    UPPER GASTROINTESTINAL ENDOSCOPY  03/15/2012    Dr Anahi Pierre (-)    [place] 05/18/2022    Dr Ollie Bautista, (-)[place]     Family History   Problem Relation Age of Onset    Asthma Brother     Stroke Maternal Grandmother     High Blood Pressure Maternal Grandmother     Diabetes Maternal Grandmother     Osteoporosis Maternal Grandmother     Cancer Maternal Grandmother         female    High Cholesterol Maternal Grandfather     Colon Polyps Father     Colon Cancer Neg Hx     Liver Cancer Neg Hx      Social History     Tobacco Use    Smoking status: Never    Smokeless tobacco: Never   Substance Use Topics    Alcohol use: Not Currently     Comment: maybe 2-3x a yr     Current Outpatient Medications   Medication Sig Dispense Refill    cefdinir (OMNICEF) 300 MG capsule Take 1 capsule by mouth 2 times daily for 7 days 14 capsule 0    SUMAtriptan (IMITREX) 6 MG/0.5ML SOLN injection Inject 0.5 mLs into the skin once as needed for Migraine 0.5 mL 5 levonorgestrel (MIRENA, 52 MG,) IUD 52 mg by IntraUTERine route      Rimegepant Sulfate (NURTEC) 75 MG TBDP Take 1 tablet at the onset of migraine. Do not exceed 1 tablet in 24 hours. 8 tablet 3    Atogepant (QULIPTA) 60 MG TABS Take 60 mg by mouth daily 30 tablet 3    butalbital-aspirin-caffeine (FIORINAL) -40 MG capsule Take 1 capsule by mouth every 4 hours as needed for Headaches for up to 30 days. Max Daily Amount: 6 capsules 15 capsule 1    QUEtiapine (SEROQUEL) 300 MG tablet Take 1 tablet by mouth nightly 30 tablet 5    traMADol (ULTRAM) 50 MG tablet Take 50 mg by mouth every 6 hours as needed for Pain. dicyclomine (BENTYL) 20 MG tablet Take 1 tablet by mouth in the morning and 1 tablet at noon and 1 tablet in the evening and 1 tablet before bedtime. 120 tablet 5    topiramate (TOPAMAX) 50 MG tablet Take 1 tablet in the AM and 2 tablets in the PM      ALPRAZolam (XANAX) 1 MG tablet Take 1 mg by mouth 3 times daily as needed. pantoprazole (PROTONIX) 40 MG tablet TAKE ONE TABLET BY MOUTH DAILY 30 tablet 5     No current facility-administered medications for this visit. Allergies   Allergen Reactions    Latex Rash     Latex RAST test results 11/30/2018=  <0.1 kU/L-no significant reactivity detected    Penicillins Rash, Hives, Nausea And Vomiting, Palpitations and Shortness Of Breath    Amitriptyline     Dicyclomine Other (See Comments)     Heart rate increases    Paxil [Paroxetine Hcl] Other (See Comments)     Suicidal and worsen depression, work very on panic attacks    Desyrel [Trazodone] Nausea And Vomiting    Zoloft [Sertraline Hcl] Nausea And Vomiting       Review of Systems   Constitutional:  Positive for fever. Negative for chills and fatigue. Gastrointestinal:  Positive for abdominal pain. Negative for nausea and vomiting. Genitourinary:  Positive for dysuria. Negative for difficulty urinating, flank pain, frequency, hematuria, menstrual problem, urgency and vaginal discharge. Musculoskeletal:  Negative for arthralgias, back pain and myalgias. Neurological:  Negative for dizziness, light-headedness and headaches. OBJECTIVE:  /72   Pulse 94   Temp 98.2 °F (36.8 °C) (Infrared)   Resp 17   Wt 108 lb 9.6 oz (49.3 kg)   SpO2 99%   BMI 20.52 kg/m²    Physical Exam  Vitals reviewed. Constitutional:       General: She is not in acute distress. Appearance: She is well-developed. Cardiovascular:      Rate and Rhythm: Normal rate and regular rhythm. Heart sounds: No murmur heard. Pulmonary:      Effort: Pulmonary effort is normal. No respiratory distress. Breath sounds: Normal breath sounds. No wheezing or rales. Abdominal:      General: Bowel sounds are normal. There is no distension. Palpations: Abdomen is soft. Tenderness: There is no abdominal tenderness. Musculoskeletal:         General: Normal range of motion. Skin:     General: Skin is warm and dry. Neurological:      Mental Status: She is alert and oriented to person, place, and time. ASSESSMENT/PLAN:  1. Urinary tract infection without hematuria, site unspecified  Recurrent. Try cranberry supplements. Start cefdinir empiric based on previous culture and tolerance. Culture pending. May use AZO prn pain  - cefdinir (OMNICEF) 300 MG capsule; Take 1 capsule by mouth 2 times daily for 7 days  Dispense: 14 capsule; Refill: 0    2. Dysuria  - Urinalysis with Reflex to Culture; Future        Return if symptoms worsen or fail to improve.

## 2023-02-19 LAB
ORGANISM: ABNORMAL
URINE CULTURE, ROUTINE: ABNORMAL
URINE CULTURE, ROUTINE: ABNORMAL

## 2023-02-20 DIAGNOSIS — K21.9 GASTROESOPHAGEAL REFLUX DISEASE WITHOUT ESOPHAGITIS: ICD-10-CM

## 2023-02-20 RX ORDER — PANTOPRAZOLE SODIUM 40 MG/1
TABLET, DELAYED RELEASE ORAL
Qty: 30 TABLET | Refills: 5 | Status: SHIPPED | OUTPATIENT
Start: 2023-02-20

## 2023-02-20 ASSESSMENT — ENCOUNTER SYMPTOMS
NAUSEA: 0
BACK PAIN: 0
ABDOMINAL PAIN: 1
VOMITING: 0

## 2023-02-21 ENCOUNTER — TELEPHONE (OUTPATIENT)
Dept: FAMILY MEDICINE CLINIC | Age: 28
End: 2023-02-21

## 2023-02-21 NOTE — TELEPHONE ENCOUNTER
----- Message from GENARO Nicole sent at 2/20/2023  7:13 PM CST -----  Culture with ecoli. The cefdinir should take care of it.

## 2023-03-01 DIAGNOSIS — R51.9 ACUTE NONINTRACTABLE HEADACHE, UNSPECIFIED HEADACHE TYPE: Primary | ICD-10-CM

## 2023-03-01 RX ORDER — BUTALBITAL, ACETAMINOPHEN AND CAFFEINE 300; 40; 50 MG/1; MG/1; MG/1
CAPSULE ORAL
Qty: 15 CAPSULE | Refills: 1 | Status: SHIPPED | OUTPATIENT
Start: 2023-03-01

## 2023-03-07 DIAGNOSIS — G43.109 MIGRAINE WITH AURA AND WITHOUT STATUS MIGRAINOSUS, NOT INTRACTABLE: ICD-10-CM

## 2023-03-07 RX ORDER — ATOGEPANT 60 MG/1
60 TABLET ORAL DAILY
Qty: 30 TABLET | Refills: 3 | OUTPATIENT
Start: 2023-03-07

## 2023-03-07 RX ORDER — RIMEGEPANT SULFATE 75 MG/75MG
TABLET, ORALLY DISINTEGRATING ORAL
Qty: 8 TABLET | Refills: 3 | Status: SHIPPED | OUTPATIENT
Start: 2023-03-07

## 2023-03-07 NOTE — TELEPHONE ENCOUNTER
Requested Prescriptions     Pending Prescriptions Disp Refills    Rimegepant Sulfate (NURTEC) 75 MG TBDP 8 tablet 3     Sig: Take 1 tablet at the onset of migraine. Do not exceed 1 tablet in 24 hours.          Last Office Visit: 2/8/2023  Next Office Visit: 5/10/2023  Last Medication Refill: 2/8/23 with 3 RF 8 tablets

## 2023-03-14 DIAGNOSIS — K30 DELAYED GASTRIC EMPTYING: Primary | ICD-10-CM

## 2023-03-14 RX ORDER — METOCLOPRAMIDE 10 MG/1
10 TABLET ORAL 4 TIMES DAILY
Qty: 120 TABLET | Refills: 3 | Status: SHIPPED | OUTPATIENT
Start: 2023-03-14 | End: 2023-04-18

## 2023-03-15 ENCOUNTER — OFFICE VISIT (OUTPATIENT)
Dept: FAMILY MEDICINE CLINIC | Age: 28
End: 2023-03-15

## 2023-03-15 VITALS
SYSTOLIC BLOOD PRESSURE: 100 MMHG | DIASTOLIC BLOOD PRESSURE: 68 MMHG | TEMPERATURE: 98.1 F | BODY MASS INDEX: 20.48 KG/M2 | WEIGHT: 108.4 LBS

## 2023-03-15 DIAGNOSIS — M79.7 FIBROMYALGIA: ICD-10-CM

## 2023-03-15 DIAGNOSIS — K30 DELAYED GASTRIC EMPTYING: Primary | ICD-10-CM

## 2023-03-15 DIAGNOSIS — K21.9 GASTROESOPHAGEAL REFLUX DISEASE WITHOUT ESOPHAGITIS: ICD-10-CM

## 2023-03-15 DIAGNOSIS — R51.9 ACUTE NONINTRACTABLE HEADACHE, UNSPECIFIED HEADACHE TYPE: ICD-10-CM

## 2023-03-15 DIAGNOSIS — K30 DELAYED GASTRIC EMPTYING: ICD-10-CM

## 2023-03-15 LAB
ALBUMIN SERPL-MCNC: 4.5 G/DL (ref 3.5–5.2)
ALP SERPL-CCNC: 62 U/L (ref 35–104)
ALT SERPL-CCNC: 6 U/L (ref 5–33)
ANION GAP SERPL CALCULATED.3IONS-SCNC: 13 MMOL/L (ref 7–19)
AST SERPL-CCNC: 12 U/L (ref 5–32)
BILIRUB SERPL-MCNC: <0.2 MG/DL (ref 0.2–1.2)
BUN SERPL-MCNC: 11 MG/DL (ref 6–20)
CALCIUM SERPL-MCNC: 9.1 MG/DL (ref 8.6–10)
CHLORIDE SERPL-SCNC: 105 MMOL/L (ref 98–111)
CO2 SERPL-SCNC: 22 MMOL/L (ref 22–29)
CREAT SERPL-MCNC: 0.8 MG/DL (ref 0.5–0.9)
ERYTHROCYTE [DISTWIDTH] IN BLOOD BY AUTOMATED COUNT: 13.4 % (ref 11.5–14.5)
GLUCOSE SERPL-MCNC: 72 MG/DL (ref 74–109)
HCT VFR BLD AUTO: 43.3 % (ref 37–47)
HGB BLD-MCNC: 13.6 G/DL (ref 12–16)
MCH RBC QN AUTO: 31.6 PG (ref 27–31)
MCHC RBC AUTO-ENTMCNC: 31.4 G/DL (ref 33–37)
MCV RBC AUTO: 100.5 FL (ref 81–99)
PLATELET # BLD AUTO: 234 K/UL (ref 130–400)
PMV BLD AUTO: 10.8 FL (ref 9.4–12.3)
POTASSIUM SERPL-SCNC: 3.6 MMOL/L (ref 3.5–5)
PROT SERPL-MCNC: 7 G/DL (ref 6.6–8.7)
RBC # BLD AUTO: 4.31 M/UL (ref 4.2–5.4)
SODIUM SERPL-SCNC: 140 MMOL/L (ref 136–145)
TSH SERPL DL<=0.005 MIU/L-ACNC: 2.2 UIU/ML (ref 0.35–5.5)
WBC # BLD AUTO: 3.7 K/UL (ref 4.8–10.8)

## 2023-03-15 RX ORDER — BUTALBITAL, ACETAMINOPHEN AND CAFFEINE 300; 40; 50 MG/1; MG/1; MG/1
CAPSULE ORAL
Qty: 15 CAPSULE | Refills: 1 | Status: SHIPPED | OUTPATIENT
Start: 2023-03-15

## 2023-03-15 RX ORDER — TRAMADOL HYDROCHLORIDE 50 MG/1
50 TABLET ORAL EVERY 6 HOURS PRN
Qty: 30 TABLET | Refills: 2 | Status: SHIPPED | OUTPATIENT
Start: 2023-03-15 | End: 2023-04-14

## 2023-03-15 RX ORDER — PANTOPRAZOLE SODIUM 40 MG/1
TABLET, DELAYED RELEASE ORAL
Qty: 30 TABLET | Refills: 5 | Status: SHIPPED | OUTPATIENT
Start: 2023-03-15

## 2023-03-15 RX ORDER — TOPIRAMATE 50 MG/1
TABLET, FILM COATED ORAL
Qty: 60 TABLET | Refills: 5 | Status: SHIPPED | OUTPATIENT
Start: 2023-03-15

## 2023-03-15 ASSESSMENT — ENCOUNTER SYMPTOMS
GASTROINTESTINAL NEGATIVE: 1
EYES NEGATIVE: 1
RESPIRATORY NEGATIVE: 1
ALLERGIC/IMMUNOLOGIC NEGATIVE: 1

## 2023-03-15 NOTE — PROGRESS NOTES
SUBJECTIVE:    Patient ID: Dino Ledezma is a 24 y. o.female. HPI:   Patient here for evaluation of multiple medical problems. Patient was complaining of chronic nausea. Looks like the gastric emptying study was read by the GI doctor as possible delayed gastric emptying issues. She did have near complete resolution of gastric count in the 4 hours. I do not see any percentages. I did start her on Reglan and that provides some improvement in her symptoms but she stated that cause abdominal pain. It does help the nausea. I will have her call GI for follow-up to see if they think she will benefit from a gastric pacer. She also have history of headaches. She takes Fioricet only as needed. Medication is helpful. She also on Topamax for migraine prophylaxis. She has been having for migraine 4 migraines a month. She was having way more than that. She also have fibromyalgia take tramadol only as needed. Medication is helpful. She is to take a PPI for acid reflux. Past Medical History:   Diagnosis Date    Allergic rhinitis       Current Outpatient Medications   Medication Sig Dispense Refill    traMADol (ULTRAM) 50 MG tablet Take 1 tablet by mouth every 6 hours as needed for Pain for up to 30 days. Max Daily Amount: 200 mg 30 tablet 2    topiramate (TOPAMAX) 50 MG tablet Take 1 tablet in the AM and 2 tablets in the PM 60 tablet 5    pantoprazole (PROTONIX) 40 MG tablet TAKE ONE TABLET BY MOUTH DAILY 30 tablet 5    butalbital-APAP-caffeine -40 MG CAPS per capsule Take 1 capsule by mouth every 4 hours as needed for Headaches for up to 30 days. Max Daily Amount: 6 capsules 15 capsule 1    metoclopramide (REGLAN) 10 MG tablet Take 1 tablet by mouth 4 times daily 120 tablet 3    Rimegepant Sulfate (NURTEC) 75 MG TBDP Take 1 tablet at the onset of migraine. Do not exceed 1 tablet in 24 hours.  8 tablet 3    SUMAtriptan (IMITREX) 6 MG/0.5ML SOLN injection Inject 0.5 mLs into the skin once as needed for Migraine 0.5 mL 5    levonorgestrel (MIRENA, 52 MG,) IUD 52 mg by IntraUTERine route      Atogepant (QULIPTA) 60 MG TABS Take 60 mg by mouth daily 30 tablet 3    butalbital-aspirin-caffeine (FIORINAL) -40 MG capsule Take 1 capsule by mouth every 4 hours as needed for Headaches for up to 30 days. Max Daily Amount: 6 capsules 15 capsule 1    QUEtiapine (SEROQUEL) 300 MG tablet Take 1 tablet by mouth nightly 30 tablet 5    dicyclomine (BENTYL) 20 MG tablet Take 1 tablet by mouth in the morning and 1 tablet at noon and 1 tablet in the evening and 1 tablet before bedtime. 120 tablet 5    ALPRAZolam (XANAX) 1 MG tablet Take 1 mg by mouth 3 times daily as needed. No current facility-administered medications for this visit. Allergies   Allergen Reactions    Latex Rash     Latex RAST test results 11/30/2018=  <0.1 kU/L-no significant reactivity detected    Penicillins Rash, Hives, Nausea And Vomiting, Palpitations and Shortness Of Breath    Amitriptyline     Dicyclomine Other (See Comments)     Heart rate increases    Paxil [Paroxetine Hcl] Other (See Comments)     Suicidal and worsen depression, work very on panic attacks    Desyrel [Trazodone] Nausea And Vomiting    Zoloft [Sertraline Hcl] Nausea And Vomiting       Review of Systems   Constitutional: Negative. HENT: Negative. Eyes: Negative. Respiratory: Negative. Cardiovascular: Negative. Gastrointestinal: Negative. Endocrine: Negative. Genitourinary: Negative. Musculoskeletal: Negative. Skin: Negative. Allergic/Immunologic: Negative. Neurological: Negative. Hematological: Negative. Psychiatric/Behavioral: Negative. OBJECTIVE:    Physical Exam  Constitutional:       Appearance: Normal appearance. She is well-developed and well-groomed. HENT:      Head: Normocephalic and atraumatic. Right Ear: Tympanic membrane, ear canal and external ear normal. There is no impacted cerumen.       Left Ear: Tympanic membrane, ear canal and external ear normal. There is no impacted cerumen. Nose: Nose normal.      Mouth/Throat:      Lips: Pink. Mouth: Mucous membranes are moist.      Dentition: Normal dentition. Pharynx: Oropharynx is clear. Uvula midline. Eyes:      General: Lids are normal.         Right eye: No discharge. Left eye: No discharge. Extraocular Movements: Extraocular movements intact. Conjunctiva/sclera: Conjunctivae normal.      Right eye: Right conjunctiva is not injected. Left eye: Left conjunctiva is not injected. Pupils: Pupils are equal, round, and reactive to light. Neck:      Thyroid: No thyromegaly. Vascular: No carotid bruit or JVD. Cardiovascular:      Rate and Rhythm: Normal rate and regular rhythm. Pulses: Normal pulses. Carotid pulses are 2+ on the right side and 2+ on the left side. Radial pulses are 2+ on the right side and 2+ on the left side. Heart sounds: Normal heart sounds, S1 normal and S2 normal. No murmur heard. Pulmonary:      Effort: Pulmonary effort is normal.      Breath sounds: Normal breath sounds. Abdominal:      General: Bowel sounds are normal. There is no distension or abdominal bruit. Palpations: Abdomen is soft. There is no mass. Hernia: No hernia is present. Musculoskeletal:         General: Normal range of motion. Cervical back: Full passive range of motion without pain, normal range of motion and neck supple. Right lower leg: No edema. Left lower leg: No edema. Lymphadenopathy:      Cervical: No cervical adenopathy. Right cervical: No superficial cervical adenopathy. Left cervical: No superficial cervical adenopathy. Upper Body:      Right upper body: No supraclavicular adenopathy. Left upper body: No supraclavicular adenopathy. Skin:     General: Skin is warm and dry. Coloration: Skin is not pale. Findings: No lesion or rash. Nails: There is no clubbing. Neurological:      Mental Status: She is alert and oriented to person, place, and time. Motor: No weakness or tremor. Coordination: Coordination normal.      Deep Tendon Reflexes: Reflexes are normal and symmetric. Psychiatric:         Attention and Perception: Attention normal.         Mood and Affect: Mood normal.         Speech: Speech normal.         Behavior: Behavior normal. Behavior is cooperative. Thought Content: Thought content normal.         Cognition and Memory: Cognition and memory normal.         Judgment: Judgment normal.      /68 (Site: Left Upper Arm, Position: Sitting, Cuff Size: Medium Adult)   Temp 98.1 °F (36.7 °C) (Temporal)   Wt 108 lb 6.4 oz (49.2 kg)   BMI 20.48 kg/m²      ASSESSMENT:     Diagnosis Orders   1. Delayed gastric emptying-gastroparesis? CBC    Comprehensive Metabolic Panel    TSH with Reflex to FT4      2. Gastroesophageal reflux disease without esophagitis-stable pantoprazole (PROTONIX) 40 MG tablet    TSH with Reflex to FT4      3. Acute nonintractable headache, unspecified headache type stable topiramate (TOPAMAX) 50 MG tablet    butalbital-APAP-caffeine -40 MG CAPS per capsule    TSH with Reflex to FT4      4. Fibromyalgia stable traMADol (ULTRAM) 50 MG tablet    TSH with Reflex to FT4           PLAN:    1. Blood work. Make an appointment to see GI to discuss possible gastric pacer. I would like to get him off of Reglan if possible. 2.  Continue PPI. 3.  Continue treatment plan and follow-up with neurology. 4.  Belia Listen and continue medication.   Follow-up 3 months

## 2023-03-20 ENCOUNTER — HOSPITAL ENCOUNTER (OUTPATIENT)
Dept: MRI IMAGING | Age: 28
Discharge: HOME | End: 2023-03-20
Payer: MEDICAID

## 2023-03-20 DIAGNOSIS — Z86.69 HISTORY OF CHIARI MALFORMATION: ICD-10-CM

## 2023-03-20 DIAGNOSIS — G43.109 MIGRAINE WITH AURA AND WITHOUT STATUS MIGRAINOSUS, NOT INTRACTABLE: ICD-10-CM

## 2023-03-20 PROCEDURE — 6360000004 HC RX CONTRAST MEDICATION: Performed by: NURSE PRACTITIONER

## 2023-03-20 PROCEDURE — 70553 MRI BRAIN STEM W/O & W/DYE: CPT

## 2023-03-20 PROCEDURE — 70553 MRI BRAIN STEM W/O & W/DYE: CPT | Performed by: RADIOLOGY

## 2023-03-20 PROCEDURE — A9577 INJ MULTIHANCE: HCPCS | Performed by: NURSE PRACTITIONER

## 2023-03-20 RX ADMIN — GADOBENATE DIMEGLUMINE 10 ML: 529 INJECTION, SOLUTION INTRAVENOUS at 13:48

## 2023-03-22 ENCOUNTER — TELEPHONE (OUTPATIENT)
Dept: NEUROLOGY | Age: 28
End: 2023-03-22

## 2023-03-22 DIAGNOSIS — G93.5 CHIARI MALFORMATION TYPE I (HCC): Primary | ICD-10-CM

## 2023-03-22 NOTE — TELEPHONE ENCOUNTER
Called and spoke with patient regarding BW note seen below. She voiced her understanding. She did voice that she has not seen neurosurgery in the past for this. She had no other questions at this time. ----- Message from GENARO Gomez CNP sent at 3/22/2023  1:28 PM CDT -----  I did receive patient's prior neurology notes. Please let her know that MRI notes known Chiari malformation measuring 8 mm. Has she has ever seen neurosurgery regarding her Chiari malformation? Typically with Chiari over 5 mm we refer for neurosurgical evaluation.

## 2023-03-22 NOTE — TELEPHONE ENCOUNTER
Attempted to contact patient regarding BW note seen below. No answer at this time. Left voicemail with my name and number to return call.   ----- Message from GENARO Mixon CNP sent at 3/22/2023  1:28 PM CDT -----  I did receive patient's prior neurology notes. Please let her know that MRI notes known Chiari malformation measuring 8 mm. Has she has ever seen neurosurgery regarding her Chiari malformation? Typically with Chiari over 5 mm we refer for neurosurgical evaluation.

## 2023-03-27 ENCOUNTER — TELEPHONE (OUTPATIENT)
Dept: NEUROSURGERY | Age: 28
End: 2023-03-27

## 2023-03-27 NOTE — TELEPHONE ENCOUNTER
[facility] New Patient Questionnaire    Diagnosis/Reason for Referral?    Chiari malformation type I     2. Who is completing questionnaire? Patient  Caregiver Family      3. Has the patient had any previous spinal/brain surgeries? NO      A. If yes, what is the name of the facility in which the surgery was performed? B. Procedure/Surgery performed? C. Who was the surgeon? D. When was the surgery? MM/YY       E. Did the patient improve after the surgery? 4. Is this a second opinion? If yes, Dr. Blaire Fox would like to review patient first before making the appointment. NO    5. Have MRI Images been obtain within the last year? Yes  No      XR  CT     If yes, where was the imaging performed? [facility]   If yes, what part of the body? Lumbar  Cervical  Thoracic  Brain     If yes, when was it obtained? 3/20/23    Note: if the scan was performed at a facility other than [facility], the disc will need to be brought to the appointment or we need to reach out to obtain the disc. A. Was the patient instructed to provide the disc? Yes   No      8. Has the patient had a NCV/EMG within the last year? Yes  No     If yes, where was it performed and date? MM/YY  Location:      9. Has the patient been to Physical Therapy? Yes  No     If yes, what location, how long attended, and last visit? Location:        Therapy Lasted:    Date of Last Visit:      10. Has the patient been to Pain Management? Yes  No     If yes, what location and last visit     Location:   Last Visit:   Is it helping?

## 2023-04-18 ENCOUNTER — OFFICE VISIT (OUTPATIENT)
Dept: NEUROSURGERY | Age: 28
End: 2023-04-18
Payer: MEDICAID

## 2023-04-18 VITALS
BODY MASS INDEX: 20.39 KG/M2 | WEIGHT: 108 LBS | HEIGHT: 61 IN | HEART RATE: 102 BPM | DIASTOLIC BLOOD PRESSURE: 82 MMHG | SYSTOLIC BLOOD PRESSURE: 116 MMHG

## 2023-04-18 DIAGNOSIS — G93.5 CHIARI MALFORMATION TYPE I (HCC): Primary | ICD-10-CM

## 2023-04-18 DIAGNOSIS — Z86.69 HX OF MIGRAINES: ICD-10-CM

## 2023-04-18 PROCEDURE — 99204 OFFICE O/P NEW MOD 45 MIN: CPT | Performed by: NEUROLOGICAL SURGERY

## 2023-04-18 RX ORDER — TRAMADOL HYDROCHLORIDE 50 MG/1
50 TABLET ORAL EVERY 6 HOURS PRN
COMMUNITY

## 2023-04-18 RX ORDER — ONDANSETRON 4 MG/1
4 TABLET, FILM COATED ORAL EVERY 8 HOURS PRN
COMMUNITY

## 2023-04-18 ASSESSMENT — ENCOUNTER SYMPTOMS
GASTROINTESTINAL NEGATIVE: 1
EYES NEGATIVE: 1
RESPIRATORY NEGATIVE: 1

## 2023-04-18 NOTE — PROGRESS NOTES
Review of Systems   Constitutional: Negative. HENT: Negative. Eyes: Negative. Respiratory: Negative. Cardiovascular: Negative. Gastrointestinal: Negative. Genitourinary: Negative. Musculoskeletal: Negative. Skin: Negative. Neurological:  Positive for dizziness, tingling and headaches. Endo/Heme/Allergies: Negative. Psychiatric/Behavioral: Negative.
Never   Smokeless Tobacco Never     Social History     Substance and Sexual Activity   Alcohol Use Not Currently    Comment: maybe 2-3x a yr         Family History:   Family History   Problem Relation Age of Onset    Asthma Brother     Stroke Maternal Grandmother     High Blood Pressure Maternal Grandmother     Diabetes Maternal Grandmother     Osteoporosis Maternal Grandmother     Cancer Maternal Grandmother         female    High Cholesterol Maternal Grandfather     Colon Polyps Father     Colon Cancer Neg Hx     Liver Cancer Neg Hx        REVIEW OF SYSTEMS:  Constitutional: Negative. HENT: Negative. Eyes: Negative. Respiratory: Negative. Cardiovascular: Negative. Gastrointestinal: Negative. Genitourinary: Negative. Musculoskeletal: Negative. Skin: Negative. Neurological:  Positive for dizziness, tingling and headaches. Endo/Heme/Allergies: Negative. Psychiatric/Behavioral: Negative. PHYSICAL EXAM:  Vitals:    04/18/23 1436   BP: 116/82   Pulse: (!) 102     Constitutional: appears well-developed and well-nourished. Eyes - conjunctiva normal.  Pupils react to light  Ear, nose,throat - hearing intact to finger rub, No scars, masses, or lesions over external nose or ears, no atrophy of tongue  Neck - symmetric, no masses noted, no jugular vein distension  Respiration - chest wall appears symmetric, good expansion, normal effort without use of accessory muscles  Musculoskeletal - no significant wasting of muscles noted, no bony deformities, gait no grossataxia  Extremities - no clubbing, cyanosis or edema  Skin - warm, dry, and intact. No rash,erythema, or pallor.   Psychiatric - mood, affect, and behavior appear normal.     Neurologic Examination  Awake, Alert and oriented x 4  CN II-XII grossly intact   No dysmetria  Normal speech pattern, following commands    Motor:  RIGHT: hand grasp 5/5    finger extension 5/5    bicep 5/5    triceps 5/5    deltoid 5/5      iliopsoas

## 2023-04-24 DIAGNOSIS — R51.9 ACUTE NONINTRACTABLE HEADACHE, UNSPECIFIED HEADACHE TYPE: ICD-10-CM

## 2023-04-24 RX ORDER — BUTALBITAL, ACETAMINOPHEN AND CAFFEINE 300; 40; 50 MG/1; MG/1; MG/1
CAPSULE ORAL
Qty: 15 CAPSULE | Refills: 1 | Status: SHIPPED | OUTPATIENT
Start: 2023-04-24

## 2023-04-28 ENCOUNTER — OFFICE VISIT (OUTPATIENT)
Dept: FAMILY MEDICINE CLINIC | Age: 28
End: 2023-04-28
Payer: MEDICAID

## 2023-04-28 VITALS
DIASTOLIC BLOOD PRESSURE: 58 MMHG | TEMPERATURE: 98.7 F | SYSTOLIC BLOOD PRESSURE: 110 MMHG | WEIGHT: 105.25 LBS | BODY MASS INDEX: 19.87 KG/M2 | HEART RATE: 99 BPM | OXYGEN SATURATION: 98 % | HEIGHT: 61 IN

## 2023-04-28 DIAGNOSIS — R50.9 FEVER, UNSPECIFIED FEVER CAUSE: ICD-10-CM

## 2023-04-28 DIAGNOSIS — R50.9 FEVER, UNSPECIFIED FEVER CAUSE: Primary | ICD-10-CM

## 2023-04-28 DIAGNOSIS — Z13.1 SCREENING FOR DIABETES MELLITUS: ICD-10-CM

## 2023-04-28 LAB
ALBUMIN SERPL-MCNC: 4.3 G/DL (ref 3.5–5.2)
ALP SERPL-CCNC: 67 U/L (ref 35–104)
ALT SERPL-CCNC: 12 U/L (ref 5–33)
ANION GAP SERPL CALCULATED.3IONS-SCNC: 12 MMOL/L (ref 7–19)
AST SERPL-CCNC: 15 U/L (ref 5–32)
BASOPHILS # BLD: 0 K/UL (ref 0–0.2)
BASOPHILS NFR BLD: 0.5 % (ref 0–1)
BILIRUB SERPL-MCNC: 0.3 MG/DL (ref 0.2–1.2)
BUN SERPL-MCNC: 11 MG/DL (ref 6–20)
CALCIUM SERPL-MCNC: 9 MG/DL (ref 8.6–10)
CHLORIDE SERPL-SCNC: 106 MMOL/L (ref 98–111)
CO2 SERPL-SCNC: 23 MMOL/L (ref 22–29)
CREAT SERPL-MCNC: 0.8 MG/DL (ref 0.5–0.9)
CRP SERPL HS-MCNC: <0.3 MG/DL (ref 0–0.5)
EOSINOPHIL # BLD: 0.1 K/UL (ref 0–0.6)
EOSINOPHIL NFR BLD: 1.5 % (ref 0–5)
ERYTHROCYTE [DISTWIDTH] IN BLOOD BY AUTOMATED COUNT: 13.2 % (ref 11.5–14.5)
GLUCOSE SERPL-MCNC: 86 MG/DL (ref 74–109)
HBA1C MFR BLD: 4.7 % (ref 4–6)
HCT VFR BLD AUTO: 43.2 % (ref 37–47)
HGB BLD-MCNC: 13.4 G/DL (ref 12–16)
IMM GRANULOCYTES # BLD: 0 K/UL
LYMPHOCYTES # BLD: 1.9 K/UL (ref 1.1–4.5)
LYMPHOCYTES NFR BLD: 48 % (ref 20–40)
MCH RBC QN AUTO: 32.1 PG (ref 27–31)
MCHC RBC AUTO-ENTMCNC: 31 G/DL (ref 33–37)
MCV RBC AUTO: 103.6 FL (ref 81–99)
MONOCYTES # BLD: 0.3 K/UL (ref 0–0.9)
MONOCYTES NFR BLD: 8.6 % (ref 0–10)
NEUTROPHILS # BLD: 1.6 K/UL (ref 1.5–7.5)
NEUTS SEG NFR BLD: 41.1 % (ref 50–65)
PLATELET # BLD AUTO: 216 K/UL (ref 130–400)
PMV BLD AUTO: 11 FL (ref 9.4–12.3)
POTASSIUM SERPL-SCNC: 3.7 MMOL/L (ref 3.5–5)
PROT SERPL-MCNC: 7 G/DL (ref 6.6–8.7)
RBC # BLD AUTO: 4.17 M/UL (ref 4.2–5.4)
SARS-COV-2 N GENE RESP QL NAA+PROBE: NOT DETECTED
SODIUM SERPL-SCNC: 141 MMOL/L (ref 136–145)
WBC # BLD AUTO: 3.9 K/UL (ref 4.8–10.8)

## 2023-04-28 PROCEDURE — 99213 OFFICE O/P EST LOW 20 MIN: CPT | Performed by: FAMILY MEDICINE

## 2023-04-28 ASSESSMENT — ENCOUNTER SYMPTOMS
BACK PAIN: 1
RESPIRATORY NEGATIVE: 1
GASTROINTESTINAL NEGATIVE: 1
ALLERGIC/IMMUNOLOGIC NEGATIVE: 1
EYES NEGATIVE: 1

## 2023-04-28 NOTE — PROGRESS NOTES
current facility-administered medications for this visit. Allergies   Allergen Reactions    Latex Rash     Latex RAST test results 11/30/2018=  <0.1 kU/L-no significant reactivity detected    Penicillins Rash, Hives, Nausea And Vomiting, Palpitations and Shortness Of Breath    Amitriptyline     Dicyclomine Other (See Comments)     Heart rate increases    Paxil [Paroxetine Hcl] Other (See Comments)     Suicidal and worsen depression, work very on panic attacks    Desyrel [Trazodone] Nausea And Vomiting    Zoloft [Sertraline Hcl] Nausea And Vomiting       Review of Systems   Constitutional:  Positive for fatigue and fever. HENT: Negative. Eyes: Negative. Respiratory: Negative. Cardiovascular: Negative. Gastrointestinal: Negative. Endocrine: Negative. Genitourinary: Negative. Musculoskeletal:  Positive for back pain. Skin: Negative. Allergic/Immunologic: Negative. Neurological: Negative. Hematological: Negative. Psychiatric/Behavioral: Negative. OBJECTIVE:    Physical Exam  Constitutional:       Appearance: Normal appearance. She is well-developed and well-groomed. HENT:      Head: Normocephalic and atraumatic. Right Ear: Tympanic membrane, ear canal and external ear normal. There is no impacted cerumen. Left Ear: Tympanic membrane, ear canal and external ear normal. There is no impacted cerumen. Nose: Nose normal.      Mouth/Throat:      Lips: Pink. Mouth: Mucous membranes are moist.      Dentition: Normal dentition. Pharynx: Oropharynx is clear. Uvula midline. Eyes:      General: Lids are normal.         Right eye: No discharge. Left eye: No discharge. Extraocular Movements: Extraocular movements intact. Conjunctiva/sclera: Conjunctivae normal.      Right eye: Right conjunctiva is not injected. Left eye: Left conjunctiva is not injected. Pupils: Pupils are equal, round, and reactive to light.    Neck:

## 2023-04-30 LAB — BACTERIA UR CULT: NORMAL

## 2023-05-05 ENCOUNTER — CLINICAL DOCUMENTATION (OUTPATIENT)
Dept: NEUROLOGY | Age: 28
End: 2023-05-05

## 2023-05-05 NOTE — PROGRESS NOTES
FAWAD ROMANO Key: [id number] - PA Case ID: [id number] help?  Call us at [phone]  Status  Sent to [facility]  Drug  Nurtec 75MG dispersible tablets  Form  [facility] Form 2017 [facility]

## 2023-05-05 NOTE — PROGRESS NOTES
FAWAD ROMANO Key: [id number] - PA Case ID: [id number] help?  Call us at [phone]  Status  Sent to [address] [place] [address] 60MG tablets  Form  [facility]

## 2023-05-17 ENCOUNTER — HOSPITAL ENCOUNTER (OUTPATIENT)
Dept: MRI IMAGING | Age: 28
Discharge: HOME | End: 2023-05-17
Payer: MEDICAID

## 2023-05-17 DIAGNOSIS — G93.5 CHIARI MALFORMATION TYPE I (HCC): ICD-10-CM

## 2023-05-17 PROCEDURE — 72141 MRI NECK SPINE W/O DYE: CPT | Performed by: RADIOLOGY

## 2023-05-17 PROCEDURE — 72141 MRI NECK SPINE W/O DYE: CPT

## 2023-05-23 ENCOUNTER — TELEPHONE (OUTPATIENT)
Dept: NEUROSURGERY | Age: 28
End: 2023-05-23

## 2023-05-26 DIAGNOSIS — M79.7 FIBROMYALGIA: Primary | ICD-10-CM

## 2023-05-26 RX ORDER — TRAMADOL HYDROCHLORIDE 50 MG/1
TABLET ORAL
Qty: 30 TABLET | Refills: 2 | Status: SHIPPED | OUTPATIENT
Start: 2023-05-26 | End: 2023-06-25

## 2023-06-06 ENCOUNTER — HOSPITAL ENCOUNTER (OUTPATIENT)
Dept: NON INVASIVE DIAGNOSTICS | Age: 28
Discharge: HOME | End: 2023-06-06
Payer: MEDICAID

## 2023-06-06 ENCOUNTER — OFFICE VISIT (OUTPATIENT)
Dept: NEUROLOGY | Age: 28
End: 2023-06-06
Payer: MEDICAID

## 2023-06-06 VITALS
HEART RATE: 113 BPM | OXYGEN SATURATION: 100 % | DIASTOLIC BLOOD PRESSURE: 80 MMHG | WEIGHT: 105 LBS | SYSTOLIC BLOOD PRESSURE: 116 MMHG | BODY MASS INDEX: 19.83 KG/M2 | HEIGHT: 61 IN

## 2023-06-06 DIAGNOSIS — G93.5 CHIARI I MALFORMATION (HCC): ICD-10-CM

## 2023-06-06 DIAGNOSIS — R42 DIZZINESS: ICD-10-CM

## 2023-06-06 DIAGNOSIS — G43.109 MIGRAINE WITH AURA AND WITHOUT STATUS MIGRAINOSUS, NOT INTRACTABLE: Primary | ICD-10-CM

## 2023-06-06 DIAGNOSIS — G44.009 CLUSTER HEADACHE, NOT INTRACTABLE, UNSPECIFIED CHRONICITY PATTERN: ICD-10-CM

## 2023-06-06 DIAGNOSIS — R00.0 TACHYCARDIA: ICD-10-CM

## 2023-06-06 PROCEDURE — 99214 OFFICE O/P EST MOD 30 MIN: CPT | Performed by: NURSE PRACTITIONER

## 2023-06-06 PROCEDURE — 93246 EXT ECG>7D<15D RECORDING: CPT

## 2023-06-06 RX ORDER — LOPERAMIDE HYDROCHLORIDE 2 MG/1
CAPSULE ORAL 2 TIMES DAILY
COMMUNITY
Start: 2020-10-20

## 2023-06-06 RX ORDER — SERTRALINE HYDROCHLORIDE 100 MG/1
TABLET, FILM COATED ORAL NIGHTLY
COMMUNITY
Start: 2021-03-01

## 2023-06-06 NOTE — PROGRESS NOTES
REVIEW OF SYSTEMS    Constitutional: []Fever []Sweat []Chills [] Recent Injury [x] Denies all unless marked  HEENT:[x]Headache  [] Head Injury/Hearing Loss  [] Sore Throat  [] Ear Ache/Dizziness  [x] Denies all unless marked  Spine:  [] Neck pain  [] Back pain  [] Sciaticia  [x] Denies all unless marked  Cardiovascular:[]Heart Disease []Chest Pain [] Palpitations  [x] Denies all unless marked  Pulmonary: []Shortness of Breath []Cough   [x] Denies all unless marke  Gastrointestinal: []Nausea  []Vomiting  []Abdominal Pain  []Constipation  []Diarrhea  []Dark Bloody Stools  [x] Denies all unless marked  Psychiatric/Behavioral:[] Depression [] Anxiety [x] Denies all unless marked  Genitourinary:   [] Frequency  [] Urgency  [] Incontinence [] Pain with Urination  [x] Denies all unless marked  Extremities: []Pain  []Swelling  [x] Denies all unless marked  Musculoskeletal: [] Muscle Pain  [] Joint Pain  [] Arthritis [] Muscle Cramps [] Muscle Twitches  [x] Denies all unless marked  Sleep: [] Insomnia [] Snoring [] Restless Legs [] Sleep Apnea  [] Daytime Sleepiness  [x] Denies all unless marked  Skin:[] Rash [] Skin Discoloration [x] Denies all unless marked   Neurological: []Visual Disturbance/Memory Loss [] Loss of Balance [] Slurred Speech/Weakness [] Seizures  [x] Vertigo/Dizziness [x] Denies all unless marked

## 2023-06-06 NOTE — PROGRESS NOTES
[facility] NEUROLOGY:    Patient: Tristan Herrera   :  1995  Age:  24 y.o. MRN:  [id number]  Today:  23    Provider: GENARO Gonzalez    Chief Complaint:  Chief Complaint   Patient presents with    Follow-up     Hx of Chiari malformation       HISTORY OF PRESENT ILLNESS:   Tristan Herrera is a 24y.o. year old female here for headache follow-up. Here with mother. She reports she is doing well overall. Since starting Saúl Most she is having 1 or less migraines and/or cluster headaches per month. Using Nurtec ODT with benefit. Is rarely needing Fiorinal.  Still on Topamax 50 mg in the morning and 100 mg in the afternoon. She denies any side effects with medications. We have completed new MRI brain imaging with Chiari malformation seen measuring 8 mm. She has been seen by neurosurgery with no surgical intervention recommended. MRI cervical spine without syrinx. Headaches first started during childhood and have progressively worsened. Pain is located to the right temple/retro-orbital and is described as sharp quality, throbbing. There is not radiation of pain. She has both migraine headaches and cluster type headaches. She was having about 2 cluster type headaches in a month. She was reporting about 1 migraine weekly, can last for a full day. Denies positional component. Headaches do wake her from sleep. There is associated photophobia, phonophobia, nausea, vomiting, hyperosmia. Denies diplopia, kristofer visual loss, jaw claudication. There is visual aura with flashing lights. Sometimes gets blurred vision. Eyes do water with conjunctival injection along with orbital swelling when she has a cluster headache. They are triggered by bright lights, certain foods, strong smells, weather, stress, menstrual cycle. She did used to see neurology in [place] [facility]). Records have been reviewed. This was several years ago. She has long prior list of medication failures.   She states

## 2023-06-19 DIAGNOSIS — G43.109 MIGRAINE WITH AURA AND WITHOUT STATUS MIGRAINOSUS, NOT INTRACTABLE: ICD-10-CM

## 2023-06-19 RX ORDER — ATOGEPANT 60 MG/1
TABLET ORAL
Qty: 30 TABLET | Refills: 3 | Status: SHIPPED | OUTPATIENT
Start: 2023-06-19

## 2023-06-19 NOTE — TELEPHONE ENCOUNTER
Requested Prescriptions     Pending Prescriptions Disp Refills    QULIPTA 60 MG TABS [Pharmacy Med Name: Calderon Velazco 60 MG TABS 60 Tablet] 30 tablet 3     Sig: TAKE ONE TABLET BY MOUTH DAILY       Last Office Visit: 6/6/2023  Next Office Visit: 10/11/2023  Last Medication Refill: 2/8/2023 with 3 RF

## 2023-07-06 DIAGNOSIS — R51.9 NONINTRACTABLE HEADACHE, UNSPECIFIED CHRONICITY PATTERN, UNSPECIFIED HEADACHE TYPE: ICD-10-CM

## 2023-07-07 RX ORDER — BUTALBITAL, ASPIRIN, AND CAFFEINE 325; 50; 40 MG/1; MG/1; MG/1
1 CAPSULE ORAL EVERY 4 HOURS PRN
Qty: 15 CAPSULE | Refills: 1 | Status: SHIPPED | OUTPATIENT
Start: 2023-07-07 | End: 2023-08-06

## 2023-07-12 ENCOUNTER — OFFICE VISIT (OUTPATIENT)
Dept: FAMILY MEDICINE CLINIC | Age: 28
End: 2023-07-12
Payer: MEDICAID

## 2023-07-12 VITALS
DIASTOLIC BLOOD PRESSURE: 64 MMHG | WEIGHT: 101.38 LBS | BODY MASS INDEX: 19.14 KG/M2 | SYSTOLIC BLOOD PRESSURE: 116 MMHG | HEIGHT: 61 IN | HEART RATE: 105 BPM | TEMPERATURE: 97.3 F | OXYGEN SATURATION: 99 %

## 2023-07-12 DIAGNOSIS — R11.0 CHRONIC NAUSEA: ICD-10-CM

## 2023-07-12 DIAGNOSIS — M79.7 FIBROMYALGIA: Primary | ICD-10-CM

## 2023-07-12 DIAGNOSIS — F41.9 ANXIETY: ICD-10-CM

## 2023-07-12 DIAGNOSIS — F41.0 PANIC ATTACK: ICD-10-CM

## 2023-07-12 DIAGNOSIS — R10.84 GENERALIZED ABDOMINAL PAIN: ICD-10-CM

## 2023-07-12 DIAGNOSIS — L74.510 AXILLARY HYPERHIDROSIS: ICD-10-CM

## 2023-07-12 PROCEDURE — 99214 OFFICE O/P EST MOD 30 MIN: CPT | Performed by: FAMILY MEDICINE

## 2023-07-12 RX ORDER — ALPRAZOLAM 1 MG/1
1 TABLET ORAL 3 TIMES DAILY PRN
Qty: 30 TABLET | Refills: 2 | Status: SHIPPED | OUTPATIENT
Start: 2023-07-12 | End: 2023-08-11

## 2023-07-12 RX ORDER — TRAMADOL HYDROCHLORIDE 50 MG/1
50 TABLET ORAL EVERY 6 HOURS PRN
Qty: 30 TABLET | Refills: 2 | Status: SHIPPED | OUTPATIENT
Start: 2023-07-12 | End: 2023-08-11

## 2023-07-12 RX ORDER — NALOXONE HYDROCHLORIDE 4 MG/.1ML
1 SPRAY NASAL PRN
Qty: 1 EACH | Refills: 5 | Status: SHIPPED | OUTPATIENT
Start: 2023-07-12

## 2023-07-12 ASSESSMENT — ENCOUNTER SYMPTOMS
EYES NEGATIVE: 1
ALLERGIC/IMMUNOLOGIC NEGATIVE: 1
RESPIRATORY NEGATIVE: 1
GASTROINTESTINAL NEGATIVE: 1

## 2023-07-12 NOTE — PROGRESS NOTES
(ZOFRAN) 4 MG tablet Take 1 tablet by mouth every 8 hours as needed for Nausea or Vomiting      topiramate (TOPAMAX) 50 MG tablet Take 1 tablet in the AM and 2 tablets in the PM 60 tablet 5    pantoprazole (PROTONIX) 40 MG tablet TAKE ONE TABLET BY MOUTH DAILY 30 tablet 5    Rimegepant Sulfate (NURTEC) 75 MG TBDP Take 1 tablet at the onset of migraine. Do not exceed 1 tablet in 24 hours. 8 tablet 3    levonorgestrel (MIRENA, 52 MG,) IUD 52 mg by IntraUTERine route      QUEtiapine (SEROQUEL) 300 MG tablet Take 1 tablet by mouth nightly 30 tablet 5    dicyclomine (BENTYL) 20 MG tablet Take 1 tablet by mouth in the morning and 1 tablet at noon and 1 tablet in the evening and 1 tablet before bedtime. 120 tablet 5    SUMAtriptan (IMITREX) 6 MG/0.5ML SOLN injection Inject 0.5 mLs into the skin once as needed for Migraine 0.5 mL 5     No current facility-administered medications for this visit. Allergies   Allergen Reactions    Latex Rash     Latex RAST test results 11/30/2018=  <0.1 kU/L-no significant reactivity detected    Penicillins Rash, Hives, Nausea And Vomiting, Palpitations and Shortness Of Breath    Amitriptyline     Dicyclomine Other (See Comments)     Heart rate increases    Paxil [Paroxetine Hcl] Other (See Comments)     Suicidal and worsen depression, work very on panic attacks    Desyrel [Trazodone] Nausea And Vomiting    Zoloft [Sertraline Hcl] Nausea And Vomiting       Review of Systems   Constitutional: Negative. HENT: Negative. Eyes: Negative. Respiratory: Negative. Cardiovascular: Negative. Gastrointestinal: Negative. Endocrine: Negative. Genitourinary: Negative. Musculoskeletal: Negative. Skin: Negative. Allergic/Immunologic: Negative. Neurological: Negative. Hematological: Negative. Psychiatric/Behavioral: Negative. OBJECTIVE:    Physical Exam  Constitutional:       Appearance: Normal appearance. She is well-developed and well-groomed.    HENT:

## 2023-07-19 ENCOUNTER — TELEPHONE (OUTPATIENT)
Dept: PSYCHIATRY | Age: 28
End: 2023-07-19

## 2023-07-19 NOTE — TELEPHONE ENCOUNTER
Pt called back to schedule an appt from a referral    Scheduled with Nancy Henning for 07/20/23 @ 3.     Electronically signed by Kasie Aviles MA on 7/19/2023 at 3:03 PM

## 2023-07-19 NOTE — TELEPHONE ENCOUNTER
Called pt to schedule an appt from a referral    No answer and LVM.     Electronically signed by Blanquita Simons MA on 7/19/2023 at 2:58 PM

## 2023-07-20 ENCOUNTER — OFFICE VISIT (OUTPATIENT)
Dept: PSYCHIATRY | Age: 28
End: 2023-07-20

## 2023-07-20 VITALS
WEIGHT: 103.1 LBS | BODY MASS INDEX: 19.47 KG/M2 | HEART RATE: 104 BPM | TEMPERATURE: 98 F | HEIGHT: 61 IN | SYSTOLIC BLOOD PRESSURE: 121 MMHG | OXYGEN SATURATION: 98 % | DIASTOLIC BLOOD PRESSURE: 81 MMHG

## 2023-07-20 DIAGNOSIS — F43.12 NIGHTMARES ASSOCIATED WITH CHRONIC POST-TRAUMATIC STRESS DISORDER: ICD-10-CM

## 2023-07-20 DIAGNOSIS — F43.12 CHRONIC POST-TRAUMATIC STRESS DISORDER (PTSD): ICD-10-CM

## 2023-07-20 DIAGNOSIS — F33.1 MODERATE EPISODE OF RECURRENT MAJOR DEPRESSIVE DISORDER (HCC): Primary | ICD-10-CM

## 2023-07-20 DIAGNOSIS — F51.5 NIGHTMARES ASSOCIATED WITH CHRONIC POST-TRAUMATIC STRESS DISORDER: ICD-10-CM

## 2023-07-20 DIAGNOSIS — F41.9 ANXIETY DISORDER, UNSPECIFIED TYPE: ICD-10-CM

## 2023-07-20 RX ORDER — HYDROXYZINE HYDROCHLORIDE 25 MG/1
25 TABLET, FILM COATED ORAL 3 TIMES DAILY PRN
Qty: 90 TABLET | Refills: 0 | Status: SHIPPED | OUTPATIENT
Start: 2023-07-20

## 2023-07-20 RX ORDER — PRAZOSIN HYDROCHLORIDE 1 MG/1
1 CAPSULE ORAL NIGHTLY
Qty: 30 CAPSULE | Refills: 0 | Status: SHIPPED | OUTPATIENT
Start: 2023-07-20

## 2023-07-20 RX ORDER — DESVENLAFAXINE 25 MG/1
25 TABLET, EXTENDED RELEASE ORAL DAILY
Qty: 30 TABLET | Refills: 0 | Status: SHIPPED | OUTPATIENT
Start: 2023-07-20 | End: 2023-07-20 | Stop reason: CLARIF

## 2023-07-20 ASSESSMENT — PATIENT HEALTH QUESTIONNAIRE - PHQ9
SUM OF ALL RESPONSES TO PHQ QUESTIONS 1-9: 24
2. FEELING DOWN, DEPRESSED OR HOPELESS: 2
4. FEELING TIRED OR HAVING LITTLE ENERGY: 3
3. TROUBLE FALLING OR STAYING ASLEEP: 3
7. TROUBLE CONCENTRATING ON THINGS, SUCH AS READING THE NEWSPAPER OR WATCHING TELEVISION: 3
10. IF YOU CHECKED OFF ANY PROBLEMS, HOW DIFFICULT HAVE THESE PROBLEMS MADE IT FOR YOU TO DO YOUR WORK, TAKE CARE OF THINGS AT HOME, OR GET ALONG WITH OTHER PEOPLE: 3
1. LITTLE INTEREST OR PLEASURE IN DOING THINGS: 3
9. THOUGHTS THAT YOU WOULD BE BETTER OFF DEAD, OR OF HURTING YOURSELF: 1
SUM OF ALL RESPONSES TO PHQ QUESTIONS 1-9: 24
8. MOVING OR SPEAKING SO SLOWLY THAT OTHER PEOPLE COULD HAVE NOTICED. OR THE OPPOSITE, BEING SO FIGETY OR RESTLESS THAT YOU HAVE BEEN MOVING AROUND A LOT MORE THAN USUAL: 3
SUM OF ALL RESPONSES TO PHQ QUESTIONS 1-9: 24
SUM OF ALL RESPONSES TO PHQ9 QUESTIONS 1 & 2: 5
5. POOR APPETITE OR OVEREATING: 3
6. FEELING BAD ABOUT YOURSELF - OR THAT YOU ARE A FAILURE OR HAVE LET YOURSELF OR YOUR FAMILY DOWN: 3
SUM OF ALL RESPONSES TO PHQ QUESTIONS 1-9: 23

## 2023-07-20 NOTE — PATIENT INSTRUCTIONS
SAFETY PLAN    A suicide Safety Plan is a document that supports someone when they are having thoughts of suicide. Warning Signs that indicate a suicidal crisis may be developing: What (situations, thoughts, feelings, body sensations, behaviors, etc.) do you experience that lets you know you are beginning to think about suicide? 1. Voices  2. Command hallucinations  3. hopelessness    Internal Coping Strategies:  What things can I do (relaxation techniques, hobbies, physical activities, etc.) to take my mind off my problems without contacting another person? 1. sewing  2. painting  3. Go outside    People and social settings that provide distraction: Who can I call or where can I go to distract me? 1. Name: Brother  Phone:   2. Name:    Phone:   3. Place:  Brother's house     4. Place: Outside    People whom I can ask for help: Who can I call when I need help - for example, friends, family, clergy, someone else? 1. Name:  Brother           Phone:   2. Name:                 Phone:   3. Name:                     Phone:       Professionals or [address] [place] [address] I can contact during a crisis: Who can I call for help - for example, my doctor, my psychiatrist, my psychologist, a mental health provider, a suicide hotline? 1. Clinician Name: Katarina [facility] [address]  Phone: [phone]      Clinician Pager or Emergency Contact #: [phone]    2. Clinician Name: [address] [place] [address]  Phone: [phone]      Clinician Pager or Emergency Contact #: [phone]    3. Suicide Prevention Lifeline: 3-477-008-TALK (0626)    4. [address] [facility] Emergency Department  [address]    Making the environment safe: How can I make my environment (house/apartment/living space) safer? For example, can I remove guns, medications, and other items? 1. Remove weapons from the home  2. Remove extra medications from the home.     [employer]
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
## 2023-07-24 ENCOUNTER — TELEPHONE (OUTPATIENT)
Dept: PSYCHIATRY | Age: 28
End: 2023-07-24

## 2023-07-24 DIAGNOSIS — G25.71 ACUTE MEDICATION-INDUCED AKATHISIA: Primary | ICD-10-CM

## 2023-07-24 DIAGNOSIS — G47.00 INSOMNIA, UNSPECIFIED TYPE: ICD-10-CM

## 2023-07-24 DIAGNOSIS — T50.905A ACUTE MEDICATION-INDUCED AKATHISIA: Primary | ICD-10-CM

## 2023-07-24 RX ORDER — MIRTAZAPINE 7.5 MG/1
3.75-7.5 TABLET, FILM COATED ORAL NIGHTLY
Qty: 30 TABLET | Refills: 0 | Status: SHIPPED | OUTPATIENT
Start: 2023-07-24

## 2023-07-24 RX ORDER — PROPRANOLOL HYDROCHLORIDE 10 MG/1
10 TABLET ORAL 3 TIMES DAILY
Qty: 90 TABLET | Refills: 0 | Status: SHIPPED | OUTPATIENT
Start: 2023-07-24

## 2023-07-24 NOTE — TELEPHONE ENCOUNTER
Called and lvm letting pt know that [facility] sent in a script for mirtazapine       Electronically signed by Lisette Carmona on 7/24/2023 at 4:40 PM.

## 2023-07-24 NOTE — TELEPHONE ENCOUNTER
Sent Referral to [facility] for therapy.     Electronically signed by Holly Brush MA on 7/24/2023 at 1:31 PM

## 2023-07-27 ENCOUNTER — OFFICE VISIT (OUTPATIENT)
Dept: GASTROENTEROLOGY | Age: 28
End: 2023-07-27
Payer: MEDICAID

## 2023-07-27 VITALS
BODY MASS INDEX: 18.5 KG/M2 | DIASTOLIC BLOOD PRESSURE: 60 MMHG | WEIGHT: 98 LBS | OXYGEN SATURATION: 98 % | HEIGHT: 61 IN | SYSTOLIC BLOOD PRESSURE: 100 MMHG | HEART RATE: 85 BPM

## 2023-07-27 DIAGNOSIS — R19.7 DIARRHEA, UNSPECIFIED TYPE: Primary | ICD-10-CM

## 2023-07-27 DIAGNOSIS — K30 DELAYED GASTRIC EMPTYING: ICD-10-CM

## 2023-07-27 PROCEDURE — 99214 OFFICE O/P EST MOD 30 MIN: CPT | Performed by: NURSE PRACTITIONER

## 2023-07-27 RX ORDER — ONDANSETRON 4 MG/1
4 TABLET, FILM COATED ORAL EVERY 8 HOURS PRN
Qty: 30 TABLET | Refills: 2 | Status: SHIPPED | OUTPATIENT
Start: 2023-07-27

## 2023-07-27 RX ORDER — BETHANECHOL CHLORIDE 25 MG/1
25 TABLET ORAL 4 TIMES DAILY
Qty: 120 TABLET | Refills: 11 | Status: SHIPPED | OUTPATIENT
Start: 2023-07-27

## 2023-07-27 RX ORDER — MONTELUKAST SODIUM 4 MG/1
1 TABLET, CHEWABLE ORAL 2 TIMES DAILY
Qty: 60 TABLET | Refills: 3 | Status: SHIPPED | OUTPATIENT
Start: 2023-07-27

## 2023-07-27 NOTE — PROGRESS NOTES
Subjective:     Patient ID: Radha Camara is a 24 y.o. female  PCP: Mala Burciaga MD  Referring Provider: Mala Burciaga MD    HPI  Patient presents to the office today with the following complaints: Abdominal Pain and Nausea & Vomiting      Patient seen in the office today with complaints of vomiting and diarrhea and also a abd cramping   She does have delayed gastric emptying as per gastric emptying study this report is in [address] [place] [address] this has been an ongoing issue   We will try bethanechol to help with th egastric emptying and colestid for her diarrhea. Assessment:     1. Diarrhea, unspecified type  -     colestipol (COLESTID) 1 g tablet; Take 1 tablet by mouth 2 times daily, Disp-60 tablet, R-3Normal  2. Delayed gastric emptying           Plan:   Follow up in 1 year or call sooner if needed   Orders  No orders of the defined types were placed in this encounter.     Medications  Orders Placed This Encounter   Medications    colestipol (COLESTID) 1 g tablet     Sig: Take 1 tablet by mouth 2 times daily     Dispense:  60 tablet     Refill:  3    bethanechol (URECHOLINE) 25 MG tablet     Sig: Take 1 tablet by mouth 4 times daily Take 30 minutes before each meal and at bedtime     Dispense:  120 tablet     Refill:  11    ondansetron (ZOFRAN) 4 MG tablet     Sig: Take 1 tablet by mouth every 8 hours as needed for Nausea or Vomiting     Dispense:  30 tablet     Refill:  2         Patient History:     Past Medical History:   Diagnosis Date    Allergic rhinitis        Past Surgical History:   Procedure Laterality Date    CHOLECYSTECTOMY  2012    COLONOSCOPY  03/15/2012    Dr Elvira Trent    COLONOSCOPY  02/23/2014    Dr Maggie Taveras colitis (stool samples sent), cipro/flagyl    COLONOSCOPY N/A 05/18/2022    Dr Martin Zaidi, (-) Micro Colitis,  21 year recall    COLPOSCOPY      TONSILLECTOMY      TYMPANOSTOMY TUBE PLACEMENT  age 5    UPPER GASTROINTESTINAL ENDOSCOPY  05/11/2015    Dr Elvira Trent reflux    UPPER

## 2023-07-31 DIAGNOSIS — G43.109 MIGRAINE WITH AURA AND WITHOUT STATUS MIGRAINOSUS, NOT INTRACTABLE: ICD-10-CM

## 2023-07-31 RX ORDER — ATOGEPANT 60 MG/1
1 TABLET ORAL DAILY
Qty: 30 TABLET | Refills: 3 | Status: SHIPPED | OUTPATIENT
Start: 2023-07-31

## 2023-07-31 ASSESSMENT — ENCOUNTER SYMPTOMS
CHOKING: 0
ABDOMINAL PAIN: 1
COUGH: 0
NAUSEA: 1
TROUBLE SWALLOWING: 0
ABDOMINAL DISTENTION: 0
SHORTNESS OF BREATH: 0
VOMITING: 1
CONSTIPATION: 0
ANAL BLEEDING: 0
RECTAL PAIN: 0
DIARRHEA: 1
BLOOD IN STOOL: 0

## 2023-07-31 NOTE — TELEPHONE ENCOUNTER
Requested Prescriptions     Pending Prescriptions Disp Refills    Atogepant (QULIPTA) 60 MG TABS 30 tablet 3     Sig: Take 1 tablet by mouth daily       Last Office Visit: 6/6/2023  Next Office Visit: 10/11/2023  Last Medication Refill: 6/9/2023 with 3 RF

## 2023-08-02 ENCOUNTER — TELEPHONE (OUTPATIENT)
Dept: PSYCHIATRY | Age: 28
End: 2023-08-02

## 2023-08-02 NOTE — TELEPHONE ENCOUNTER
Called pt for appointment reminder.   -Pt confirmed      Electronically signed by Alexander Rolon MA on 8/2/2023 at 11:40 AM

## 2023-08-03 ENCOUNTER — OFFICE VISIT (OUTPATIENT)
Dept: PSYCHIATRY | Age: 28
End: 2023-08-03
Payer: MEDICAID

## 2023-08-03 VITALS
OXYGEN SATURATION: 100 % | BODY MASS INDEX: 18.16 KG/M2 | HEIGHT: 61 IN | WEIGHT: 96.2 LBS | TEMPERATURE: 97.7 F | SYSTOLIC BLOOD PRESSURE: 123 MMHG | HEART RATE: 106 BPM | DIASTOLIC BLOOD PRESSURE: 81 MMHG

## 2023-08-03 DIAGNOSIS — G47.00 INSOMNIA, UNSPECIFIED TYPE: ICD-10-CM

## 2023-08-03 DIAGNOSIS — G25.71 ACUTE MEDICATION-INDUCED AKATHISIA: ICD-10-CM

## 2023-08-03 DIAGNOSIS — T50.905A ACUTE MEDICATION-INDUCED AKATHISIA: ICD-10-CM

## 2023-08-03 DIAGNOSIS — F41.9 ANXIETY DISORDER, UNSPECIFIED TYPE: ICD-10-CM

## 2023-08-03 DIAGNOSIS — F33.1 MODERATE EPISODE OF RECURRENT MAJOR DEPRESSIVE DISORDER (HCC): Primary | ICD-10-CM

## 2023-08-03 PROCEDURE — 99214 OFFICE O/P EST MOD 30 MIN: CPT

## 2023-08-03 RX ORDER — DOXEPIN HYDROCHLORIDE 10 MG/1
10 CAPSULE ORAL NIGHTLY
Qty: 30 CAPSULE | Refills: 0 | Status: SHIPPED | OUTPATIENT
Start: 2023-08-03

## 2023-08-03 RX ORDER — PROPRANOLOL HYDROCHLORIDE 10 MG/1
10 TABLET ORAL 3 TIMES DAILY
Qty: 90 TABLET | Refills: 0 | Status: SHIPPED | OUTPATIENT
Start: 2023-08-03

## 2023-08-03 RX ORDER — HYDROXYZINE HYDROCHLORIDE 25 MG/1
25 TABLET, FILM COATED ORAL 3 TIMES DAILY PRN
Qty: 90 TABLET | Refills: 0 | Status: SHIPPED | OUTPATIENT
Start: 2023-08-03

## 2023-08-03 RX ORDER — CHOLECALCIFEROL (VITAMIN D3) 125 MCG
5 CAPSULE ORAL NIGHTLY
Qty: 30 TABLET | Refills: 0 | Status: SHIPPED | OUTPATIENT
Start: 2023-08-03

## 2023-08-03 ASSESSMENT — PATIENT HEALTH QUESTIONNAIRE - PHQ9
SUM OF ALL RESPONSES TO PHQ QUESTIONS 1-9: 7
SUM OF ALL RESPONSES TO PHQ9 QUESTIONS 1 & 2: 0
SUM OF ALL RESPONSES TO PHQ QUESTIONS 1-9: 7
10. IF YOU CHECKED OFF ANY PROBLEMS, HOW DIFFICULT HAVE THESE PROBLEMS MADE IT FOR YOU TO DO YOUR WORK, TAKE CARE OF THINGS AT HOME, OR GET ALONG WITH OTHER PEOPLE: 1
5. POOR APPETITE OR OVEREATING: 2
SUM OF ALL RESPONSES TO PHQ QUESTIONS 1-9: 7
1. LITTLE INTEREST OR PLEASURE IN DOING THINGS: 0
9. THOUGHTS THAT YOU WOULD BE BETTER OFF DEAD, OR OF HURTING YOURSELF: 0
4. FEELING TIRED OR HAVING LITTLE ENERGY: 0
SUM OF ALL RESPONSES TO PHQ QUESTIONS 1-9: 7
8. MOVING OR SPEAKING SO SLOWLY THAT OTHER PEOPLE COULD HAVE NOTICED. OR THE OPPOSITE, BEING SO FIGETY OR RESTLESS THAT YOU HAVE BEEN MOVING AROUND A LOT MORE THAN USUAL: 1
6. FEELING BAD ABOUT YOURSELF - OR THAT YOU ARE A FAILURE OR HAVE LET YOURSELF OR YOUR FAMILY DOWN: 1
2. FEELING DOWN, DEPRESSED OR HOPELESS: 0
3. TROUBLE FALLING OR STAYING ASLEEP: 2
7. TROUBLE CONCENTRATING ON THINGS, SUCH AS READING THE NEWSPAPER OR WATCHING TELEVISION: 1

## 2023-08-03 ASSESSMENT — COLUMBIA-SUICIDE SEVERITY RATING SCALE - C-SSRS
1. WITHIN THE PAST MONTH, HAVE YOU WISHED YOU WERE DEAD OR WISHED YOU COULD GO TO SLEEP AND NOT WAKE UP?: NO
2. HAVE YOU ACTUALLY HAD ANY THOUGHTS OF KILLING YOURSELF?: NO
6. HAVE YOU EVER DONE ANYTHING, STARTED TO DO ANYTHING, OR PREPARED TO DO ANYTHING TO END YOUR LIFE?: NO
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
## 2023-08-09 DIAGNOSIS — R51.9 ACUTE NONINTRACTABLE HEADACHE, UNSPECIFIED HEADACHE TYPE: ICD-10-CM

## 2023-08-09 RX ORDER — TOPIRAMATE 50 MG/1
TABLET, FILM COATED ORAL
Qty: 60 TABLET | Refills: 5 | Status: SHIPPED | OUTPATIENT
Start: 2023-08-09

## 2023-08-11 ENCOUNTER — TELEPHONE (OUTPATIENT)
Dept: PSYCHIATRY | Age: 28
End: 2023-08-11

## 2023-08-14 ENCOUNTER — OFFICE VISIT (OUTPATIENT)
Dept: CARDIOLOGY CLINIC | Age: 28
End: 2023-08-14
Payer: MEDICAID

## 2023-08-14 ENCOUNTER — TELEPHONE (OUTPATIENT)
Dept: PSYCHIATRY | Age: 28
End: 2023-08-14

## 2023-08-14 VITALS
BODY MASS INDEX: 18.88 KG/M2 | WEIGHT: 100 LBS | DIASTOLIC BLOOD PRESSURE: 70 MMHG | HEART RATE: 80 BPM | HEIGHT: 61 IN | SYSTOLIC BLOOD PRESSURE: 110 MMHG

## 2023-08-14 DIAGNOSIS — R00.0 TACHYCARDIA: Primary | ICD-10-CM

## 2023-08-14 DIAGNOSIS — G47.00 INSOMNIA, UNSPECIFIED TYPE: Primary | ICD-10-CM

## 2023-08-14 PROCEDURE — 93000 ELECTROCARDIOGRAM COMPLETE: CPT | Performed by: INTERNAL MEDICINE

## 2023-08-14 PROCEDURE — 99204 OFFICE O/P NEW MOD 45 MIN: CPT | Performed by: INTERNAL MEDICINE

## 2023-08-14 RX ORDER — DOXEPIN HYDROCHLORIDE 25 MG/1
25-50 CAPSULE ORAL NIGHTLY
Qty: 60 CAPSULE | Refills: 0 | Status: SHIPPED | OUTPATIENT
Start: 2023-08-14

## 2023-08-14 ASSESSMENT — ENCOUNTER SYMPTOMS
EYE REDNESS: 0
WHEEZING: 0
SHORTNESS OF BREATH: 0
BLOOD IN STOOL: 0
ABDOMINAL PAIN: 0
APNEA: 0
SORE THROAT: 0
VOMITING: 0
NAUSEA: 0
ABDOMINAL DISTENTION: 0
CHEST TIGHTNESS: 1
FACIAL SWELLING: 0
COUGH: 0
DIARRHEA: 0
EYE DISCHARGE: 0
CONSTIPATION: 0
EYE PAIN: 0

## 2023-08-14 NOTE — PROGRESS NOTES
Cardiology Office Visit Note  [address] [place], [address] [address]  Phone: [phone]  Fax: [phone]                            Date:  8/14/2023  Patient: Michelle Michael  Age:  24 y. o., 1995    Referral: GENARO Cristobal *    REASON FOR VISIT:  No chief complaint on file. PROBLEM LIST:    Patient Active Problem List    Diagnosis Date Noted    Generalized abdominal cramping 05/04/2022     Priority: Medium    Chronic epigastric pain 05/04/2022     Priority: Medium    Chronic vomiting 05/04/2022     Priority: Medium    Chronic nausea 05/04/2022     Priority: Medium    Chronic diarrhea 05/04/2022     Priority: Medium    Near syncope 08/29/2016    Tachycardia 08/29/2016    Chest pain 08/29/2016    Panic attack 08/10/2016    Gastroesophageal reflux disease without esophagitis 03/04/2016    IBS (irritable bowel syndrome) 01/06/2015    Allergic rhinitis          PRESENTATION: Michelle Michael is a 24y.o. year old female seen in consultation today for further evaluation and management of chronic chest pain syndrome. Patient reports that she has been having chronic intermittent chest pain for many years now. Symptoms are no worse than previous. Her main issue is that of chronic intermittent nausea and vomiting related to gastroparesis. She also has a past medical history notable for important psychiatric diagnoses including schizoaffective disorder, anxiety, posttraumatic stress disorder and suicidal attempt by hanging in 2017. Review of her records notable for cardiac testing in 2016 including echocardiogram which at that time was negative for major cardiomyopathy, valvulopathy, with no evidence for intracardiac shunt by bubble study. Cardiac event monitor at that time was also largely unremarkable with symptoms correlated to sinus rhythm and PACs.   Recently she had a repeat cardiac event monitor in June of this year which was notable for predominantly sinus rhythm and sinus

## 2023-08-14 NOTE — TELEPHONE ENCOUNTER
Per direction of DEBORAH LAM, pt informed that she wanted to try increasing the Doxepin before considering the Seroquel. Pt informed that a RX for Doxepin 25 mg take 1-2 caps at bedtime had been sent to her pharmacy. Pt informed that the APRN wanted her to call back by Friday if still not sleeping and she would order the Seroquel. Pt verbalized understanding and agreement to the plan above.

## 2023-08-17 RX ORDER — ONDANSETRON 4 MG/1
4 TABLET, ORALLY DISINTEGRATING ORAL 3 TIMES DAILY PRN
Qty: 21 TABLET | Refills: 2 | Status: SHIPPED | OUTPATIENT
Start: 2023-08-17

## 2023-08-23 ENCOUNTER — TELEPHONE (OUTPATIENT)
Dept: PSYCHIATRY | Age: 28
End: 2023-08-23

## 2023-08-23 DIAGNOSIS — G47.00 INSOMNIA, UNSPECIFIED TYPE: Primary | ICD-10-CM

## 2023-08-23 RX ORDER — QUETIAPINE FUMARATE 50 MG/1
50-100 TABLET, FILM COATED ORAL NIGHTLY
Qty: 60 TABLET | Refills: 0 | Status: SHIPPED | OUTPATIENT
Start: 2023-08-23

## 2023-08-23 NOTE — TELEPHONE ENCOUNTER
Pt returned phone call     Per Aditya LAM let pt know that provider had concerns about her auditory hallucinations and paranoia. MA asked pt if the auditory hallucinations was commanding her to harm her self or other. Pt denied SI and HI. Offer pt to get her in on 8/23. Pt stated that she has another appt today and she will be fine until her appt on 8/28. MA also let pt know per provider is to stop the doxepin and that she will send in a script for Seroquel 50 MG at night. If she feels like  she needs to take 100 MG instead that the provider is ok with that. Also MA let pt know if it's not better by Friday is to call the office. Pt understood the directions that was giving to her.     Electronically signed by Cyn Polo on 8/23/2023 at 8:35 AM

## 2023-08-23 NOTE — PROGRESS NOTES
Doxepin ineffective for sleep. Will initiate seroquel at lower dose, 50 mg nightly for sleep. Patient to contact office in a few days if not better.   Electronically signed by GENARO Christopher CNP on 8/23/2023 at 8:32 AM

## 2023-08-23 NOTE — TELEPHONE ENCOUNTER
Discussed the message with Shanita LAM. Per provider called pt to check on her. Had to [facility] asking pt to return call. Let provider know that MA had to [facility] and call her back in a few if she hasn't called back.     Electronically signed by Montserrat Alexander on 8/23/2023 at 8:07 AM

## 2023-08-25 ENCOUNTER — TELEPHONE (OUTPATIENT)
Dept: PSYCHIATRY | Age: 28
End: 2023-08-25

## 2023-08-25 NOTE — TELEPHONE ENCOUNTER
Called pt for appointment reminder.   -left voicemail, requesting a return call    Electronically signed by Sabina Mendes MA on 8/25/2023 at 2:56 PM

## 2023-08-28 ENCOUNTER — TELEPHONE (OUTPATIENT)
Dept: PSYCHIATRY | Age: 28
End: 2023-08-28

## 2023-08-29 ENCOUNTER — TELEPHONE (OUTPATIENT)
Dept: PSYCHIATRY | Age: 28
End: 2023-08-29

## 2023-08-29 NOTE — TELEPHONE ENCOUNTER
Called pt for appointment reminder.   -left voicemail, requesting a return call    Electronically signed by Delroy Gates MA on 8/29/2023 at 3:06 PM

## 2023-08-30 ENCOUNTER — OFFICE VISIT (OUTPATIENT)
Dept: PSYCHIATRY | Age: 28
End: 2023-08-30
Payer: MEDICAID

## 2023-08-30 VITALS
RESPIRATION RATE: 18 BRPM | HEART RATE: 100 BPM | HEIGHT: 61 IN | SYSTOLIC BLOOD PRESSURE: 129 MMHG | BODY MASS INDEX: 18.84 KG/M2 | OXYGEN SATURATION: 100 % | DIASTOLIC BLOOD PRESSURE: 86 MMHG | WEIGHT: 99.8 LBS | TEMPERATURE: 98.3 F

## 2023-08-30 DIAGNOSIS — F33.1 MODERATE EPISODE OF RECURRENT MAJOR DEPRESSIVE DISORDER (HCC): Primary | ICD-10-CM

## 2023-08-30 DIAGNOSIS — F41.9 ANXIETY DISORDER, UNSPECIFIED TYPE: ICD-10-CM

## 2023-08-30 DIAGNOSIS — F43.12 CHRONIC POST-TRAUMATIC STRESS DISORDER (PTSD): ICD-10-CM

## 2023-08-30 DIAGNOSIS — G47.00 INSOMNIA, UNSPECIFIED TYPE: ICD-10-CM

## 2023-08-30 PROCEDURE — 99214 OFFICE O/P EST MOD 30 MIN: CPT

## 2023-08-30 RX ORDER — LAMOTRIGINE 25 MG/1
TABLET ORAL
Qty: 78 TABLET | Refills: 0 | Status: SHIPPED | OUTPATIENT
Start: 2023-08-30 | End: 2023-09-29

## 2023-08-30 RX ORDER — DESVENLAFAXINE 25 MG/1
TABLET, EXTENDED RELEASE ORAL
Qty: 53 TABLET | Refills: 0 | Status: SHIPPED | OUTPATIENT
Start: 2023-08-30 | End: 2023-09-29

## 2023-08-30 RX ORDER — QUETIAPINE FUMARATE 50 MG/1
100 TABLET, FILM COATED ORAL NIGHTLY
Qty: 60 TABLET | Refills: 0
Start: 2023-08-30

## 2023-08-30 ASSESSMENT — PATIENT HEALTH QUESTIONNAIRE - PHQ9
SUM OF ALL RESPONSES TO PHQ QUESTIONS 1-9: 12
5. POOR APPETITE OR OVEREATING: 1
SUM OF ALL RESPONSES TO PHQ QUESTIONS 1-9: 13
9. THOUGHTS THAT YOU WOULD BE BETTER OFF DEAD, OR OF HURTING YOURSELF: 1
4. FEELING TIRED OR HAVING LITTLE ENERGY: 1
SUM OF ALL RESPONSES TO PHQ9 QUESTIONS 1 & 2: 2
SUM OF ALL RESPONSES TO PHQ QUESTIONS 1-9: 13
8. MOVING OR SPEAKING SO SLOWLY THAT OTHER PEOPLE COULD HAVE NOTICED. OR THE OPPOSITE, BEING SO FIGETY OR RESTLESS THAT YOU HAVE BEEN MOVING AROUND A LOT MORE THAN USUAL: 3
1. LITTLE INTEREST OR PLEASURE IN DOING THINGS: 1
6. FEELING BAD ABOUT YOURSELF - OR THAT YOU ARE A FAILURE OR HAVE LET YOURSELF OR YOUR FAMILY DOWN: 1
3. TROUBLE FALLING OR STAYING ASLEEP: 2
10. IF YOU CHECKED OFF ANY PROBLEMS, HOW DIFFICULT HAVE THESE PROBLEMS MADE IT FOR YOU TO DO YOUR WORK, TAKE CARE OF THINGS AT HOME, OR GET ALONG WITH OTHER PEOPLE: 2
7. TROUBLE CONCENTRATING ON THINGS, SUCH AS READING THE NEWSPAPER OR WATCHING TELEVISION: 2
SUM OF ALL RESPONSES TO PHQ QUESTIONS 1-9: 13
2. FEELING DOWN, DEPRESSED OR HOPELESS: 1

## 2023-08-30 ASSESSMENT — COLUMBIA-SUICIDE SEVERITY RATING SCALE - C-SSRS
2. HAVE YOU ACTUALLY HAD ANY THOUGHTS OF KILLING YOURSELF?: NO
6. HAVE YOU EVER DONE ANYTHING, STARTED TO DO ANYTHING, OR PREPARED TO DO ANYTHING TO END YOUR LIFE?: NO
1. WITHIN THE PAST MONTH, HAVE YOU WISHED YOU WERE DEAD OR WISHED YOU COULD GO TO SLEEP AND NOT WAKE UP?: YES

## 2023-08-30 NOTE — PROGRESS NOTES
8/30/23        Progress Note    Alan Gibson 1995      Chief Complaint   Patient presents with    Medication Check         Subjective:    Patient is a 24 y.o. female diagnosed with MDD, anxiety, PTSD, insomnia, and presents today for follow-up. Last seen in clinic on 8/3/2023  and prior records were reviewed. Last visit: \"I am actually feeling better, I do not think I realized how depressed I actually was, because over the last week and actually started feeling better. \"   Patient seen in office today, wearing casual clothing, appropriate to season. She is alert and oriented x 4. She is calm, cooperative, and pleasant. Affect is slightly brighter today. She reports that she has been compliant with medication, feels the Storm Cotton has been somewhat effective with mood. She had to stop the prazosin for nightmares, due to it causing dizziness the next morning and throughout the day. She reports after stopping at the dizziness stopped. She attempted mirtazapine to help with sleep, however it caused GI upset and she has not been able to tolerate it so has discontinued. She discontinued Seroquel, reports that she feels better off of the Seroquel than she did on it, feels like her head is clearer. She has had some mild symptoms of akathisia after starting Vraylar, propranolol was sent in a week or 2 ago and after starting the propranolol, restlessness and akithisia symptoms subsided. Discussed trial of doxepin to help with sleep, patient agreeable, we will start with a lower dose to assist with avoiding GI upset. Patient denies any active or current suicidal or homicidal ideations, denies hallucinations, and no overt paranoia or delusions appreciated. She reported anxiety has been controlled with taking hydroxyzine as needed. Has [facility] scheduled at the end of this month. Will follow up in 1 month.     Today patient states, \"I am doing little bit better since last

## 2023-09-05 ENCOUNTER — TELEPHONE (OUTPATIENT)
Dept: PSYCHIATRY | Age: 28
End: 2023-09-05

## 2023-09-05 NOTE — TELEPHONE ENCOUNTER
Pt sent message stating she had a psychogenic seizure last night, pt denies a specific trigger-says mother stated she was unresponsive for 4 minutes-did not seek medical care. Called pt to gather more info-pt reported that was the first psychogenic seizure she has had in 3 yrs-pt stated she has not reported this with Nayeli LAM. Pt stated that last night she started feeling spaced out, sweaty and clammy-then was out for about 4 min-she stated her mother said she curled up, jerking,stiff and unresponsive. She stated that her mother said it looked more like a real seizure than it had in the past.  Pt stated that she used to have about 1 psychogenic seizure per week. 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 Pt stated that today her body hurts and has brain fog. Pt denies any hallucinations or suicidal thoughts since her last appt with DEBORAH LAM. She denied any substance abuse currently. She stated that she is currently taking Seroquel 100 mg at night. Pristiq 25 mg daily (has not increased it to 50 mg)  She never started the Lamotrigine titration-\"wanted to wait and see if the Pristiq was working\". Takes Hydroxyzine sometimes. Pt stated she had not seen a therapist at [address] [place] [address] yet-they have contacted her but she has not called them back yet-pt strongly encouraged to make the appt. Pt stated she has not gone to [facility] [email or web address] yet to meet the person she met online. Pt informed that Gerhardt Camp is out of the office this week, that Dr Zoltan Naylor will be given the above info and she will be called back.

## 2023-09-05 NOTE — TELEPHONE ENCOUNTER
Per direction of Dr Sal Olivas, pt informed that therapy is important to help with psychogenic seizures. Pt again encouraged to get the therapy appt made-pt agreeable. Dr also wants pt to take her meds as prescribed per Burt LAM-Dr wants pt to increase the Pristiq to 50 mg daily after taking the 25 mg for 1 wk and to start the Lamotrigine titration as ordered per Burt LAM. Pt informed that stress and sleep deprivation can be triggers for the psychogenic seizures. Pt verbalized understanding of the above info and plans to follow. Pt encouraged to call back as needed with any questions or concerns.

## 2023-09-06 DIAGNOSIS — K21.9 GASTROESOPHAGEAL REFLUX DISEASE WITHOUT ESOPHAGITIS: ICD-10-CM

## 2023-09-06 RX ORDER — PANTOPRAZOLE SODIUM 40 MG/1
TABLET, DELAYED RELEASE ORAL
Qty: 30 TABLET | Refills: 5 | Status: SHIPPED | OUTPATIENT
Start: 2023-09-06

## 2023-09-08 ENCOUNTER — TELEPHONE (OUTPATIENT)
Dept: PSYCHIATRY | Age: 28
End: 2023-09-08

## 2023-09-08 NOTE — TELEPHONE ENCOUNTER
Pt 's mother called stating that the pt needed to get in to see Briana Haver sooner than her appt on 09/20/23. Rescheduled for 09/11/23 @ 11.     Electronically signed by Corinne Severino MA on 9/8/2023 at 10:48 AM

## 2023-09-11 ENCOUNTER — OFFICE VISIT (OUTPATIENT)
Dept: PSYCHIATRY | Age: 28
End: 2023-09-11
Payer: MEDICAID

## 2023-09-11 ENCOUNTER — TELEPHONE (OUTPATIENT)
Dept: PSYCHIATRY | Age: 28
End: 2023-09-11

## 2023-09-11 VITALS
DIASTOLIC BLOOD PRESSURE: 77 MMHG | BODY MASS INDEX: 17.86 KG/M2 | HEART RATE: 102 BPM | HEIGHT: 61 IN | WEIGHT: 94.6 LBS | SYSTOLIC BLOOD PRESSURE: 110 MMHG | OXYGEN SATURATION: 100 % | TEMPERATURE: 97.9 F

## 2023-09-11 DIAGNOSIS — F60.3 BORDERLINE PERSONALITY DISORDER (HCC): ICD-10-CM

## 2023-09-11 DIAGNOSIS — F43.12 CHRONIC POST-TRAUMATIC STRESS DISORDER (PTSD): ICD-10-CM

## 2023-09-11 DIAGNOSIS — Z79.899 CONTROLLED SUBSTANCE AGREEMENT SIGNED: ICD-10-CM

## 2023-09-11 DIAGNOSIS — F33.1 MODERATE EPISODE OF RECURRENT MAJOR DEPRESSIVE DISORDER (HCC): Primary | ICD-10-CM

## 2023-09-11 DIAGNOSIS — F41.1 GAD (GENERALIZED ANXIETY DISORDER): Primary | ICD-10-CM

## 2023-09-11 DIAGNOSIS — F41.9 ANXIETY DISORDER, UNSPECIFIED TYPE: ICD-10-CM

## 2023-09-11 DIAGNOSIS — G47.00 INSOMNIA, UNSPECIFIED TYPE: ICD-10-CM

## 2023-09-11 PROCEDURE — 99214 OFFICE O/P EST MOD 30 MIN: CPT

## 2023-09-11 RX ORDER — VILAZODONE HYDROCHLORIDE 10 MG/1
TABLET ORAL
Qty: 46 TABLET | Refills: 0 | Status: SHIPPED | OUTPATIENT
Start: 2023-09-11 | End: 2023-10-11

## 2023-09-11 RX ORDER — GABAPENTIN 300 MG/1
300 CAPSULE ORAL 3 TIMES DAILY
Qty: 90 CAPSULE | Refills: 0 | Status: SHIPPED | OUTPATIENT
Start: 2023-09-11 | End: 2023-10-11

## 2023-09-11 ASSESSMENT — PATIENT HEALTH QUESTIONNAIRE - PHQ9
SUM OF ALL RESPONSES TO PHQ QUESTIONS 1-9: 18
4. FEELING TIRED OR HAVING LITTLE ENERGY: 2
SUM OF ALL RESPONSES TO PHQ9 QUESTIONS 1 & 2: 4
6. FEELING BAD ABOUT YOURSELF - OR THAT YOU ARE A FAILURE OR HAVE LET YOURSELF OR YOUR FAMILY DOWN: 2
SUM OF ALL RESPONSES TO PHQ QUESTIONS 1-9: 18
SUM OF ALL RESPONSES TO PHQ QUESTIONS 1-9: 18
10. IF YOU CHECKED OFF ANY PROBLEMS, HOW DIFFICULT HAVE THESE PROBLEMS MADE IT FOR YOU TO DO YOUR WORK, TAKE CARE OF THINGS AT HOME, OR GET ALONG WITH OTHER PEOPLE: 2
1. LITTLE INTEREST OR PLEASURE IN DOING THINGS: 2
2. FEELING DOWN, DEPRESSED OR HOPELESS: 2
5. POOR APPETITE OR OVEREATING: 2
7. TROUBLE CONCENTRATING ON THINGS, SUCH AS READING THE NEWSPAPER OR WATCHING TELEVISION: 2
3. TROUBLE FALLING OR STAYING ASLEEP: 2
8. MOVING OR SPEAKING SO SLOWLY THAT OTHER PEOPLE COULD HAVE NOTICED. OR THE OPPOSITE, BEING SO FIGETY OR RESTLESS THAT YOU HAVE BEEN MOVING AROUND A LOT MORE THAN USUAL: 2
9. THOUGHTS THAT YOU WOULD BE BETTER OFF DEAD, OR OF HURTING YOURSELF: 2
SUM OF ALL RESPONSES TO PHQ QUESTIONS 1-9: 16

## 2023-09-11 NOTE — TELEPHONE ENCOUNTER
Pt informed of need for UDS as ordered per Rin LAM due to her prescribing the Gabapentin. Pt informed that she can get the UDS after her next appt-location of lab given. Pt verbalized understanding and agreement.

## 2023-09-19 ENCOUNTER — OFFICE VISIT (OUTPATIENT)
Dept: NEUROLOGY | Age: 28
End: 2023-09-19
Payer: MEDICAID

## 2023-09-19 VITALS
WEIGHT: 94 LBS | DIASTOLIC BLOOD PRESSURE: 80 MMHG | BODY MASS INDEX: 17.75 KG/M2 | OXYGEN SATURATION: 99 % | SYSTOLIC BLOOD PRESSURE: 114 MMHG | HEIGHT: 61 IN | HEART RATE: 120 BPM

## 2023-09-19 DIAGNOSIS — G44.009 CLUSTER HEADACHE, NOT INTRACTABLE, UNSPECIFIED CHRONICITY PATTERN: ICD-10-CM

## 2023-09-19 DIAGNOSIS — G93.5 CHIARI I MALFORMATION (HCC): ICD-10-CM

## 2023-09-19 DIAGNOSIS — R51.9 ACUTE NONINTRACTABLE HEADACHE, UNSPECIFIED HEADACHE TYPE: ICD-10-CM

## 2023-09-19 DIAGNOSIS — G43.109 MIGRAINE WITH AURA AND WITHOUT STATUS MIGRAINOSUS, NOT INTRACTABLE: Primary | ICD-10-CM

## 2023-09-19 DIAGNOSIS — R56.9 CONVULSIONS, UNSPECIFIED CONVULSION TYPE (HCC): ICD-10-CM

## 2023-09-19 PROCEDURE — 99214 OFFICE O/P EST MOD 30 MIN: CPT | Performed by: NURSE PRACTITIONER

## 2023-09-19 RX ORDER — ATOGEPANT 60 MG/1
1 TABLET ORAL DAILY
Qty: 30 TABLET | Refills: 3 | Status: SHIPPED | OUTPATIENT
Start: 2023-09-19

## 2023-09-19 RX ORDER — TOPIRAMATE 50 MG/1
TABLET, FILM COATED ORAL
Qty: 60 TABLET | Refills: 5 | Status: SHIPPED | OUTPATIENT
Start: 2023-09-19

## 2023-09-19 NOTE — PROGRESS NOTES
REVIEW OF SYSTEMS    Constitutional: []Fever []Sweat []Chills [] Recent Injury [x] Denies all unless marked  HEENT:[x]Headache  [] Head Injury/Hearing Loss  [] Sore Throat  [] Ear Ache/Dizziness  [x] Denies all unless marked  Spine:  [] Neck pain  [] Back pain  [] Sciaticia  [x] Denies all unless marked  Cardiovascular:[]Heart Disease []Chest Pain [] Palpitations  [x] Denies all unless marked  Pulmonary: []Shortness of Breath []Cough   [x] Denies all unless marke  Gastrointestinal: []Nausea  []Vomiting  []Abdominal Pain  []Constipation  []Diarrhea  []Dark Bloody Stools  [x] Denies all unless marked  Psychiatric/Behavioral:[] Depression [] Anxiety [x] Denies all unless marked  Genitourinary:   [] Frequency  [] Urgency  [] Incontinence [] Pain with Urination  [x] Denies all unless marked  Extremities: []Pain  []Swelling  [x] Denies all unless marked  Musculoskeletal: [x] Muscle Pain  [x] Joint Pain  [] Arthritis [] Muscle Cramps [] Muscle Twitches  [x] Denies all unless marked  Sleep: [] Insomnia [] Snoring [] Restless Legs [] Sleep Apnea  [] Daytime Sleepiness  [x] Denies all unless marked  Skin:[] Rash [] Skin Discoloration [x] Denies all unless marked   Neurological: []Visual Disturbance/Memory Loss [] Loss of Balance [] Slurred Speech/Weakness [x] Seizures  [] Vertigo/Dizziness [x] Denies all unless marked
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
## 2023-09-21 ENCOUNTER — CLINICAL DOCUMENTATION (OUTPATIENT)
Dept: NEUROLOGY | Age: 28
End: 2023-09-21

## 2023-09-21 NOTE — PROGRESS NOTES
FAWAD CANNON Case ID: [id number] - Rx #: [id number] help?  Call us at [phone]  Status  Sent to [employer]  Drug  Topiramate 50MG tablets  Form  [facility] Form 2017 NCPDP  Original Claim Info  9G Quantity Dispensed Exceeds Maximum Allowed

## 2023-09-27 ENCOUNTER — PATIENT MESSAGE (OUTPATIENT)
Dept: FAMILY MEDICINE CLINIC | Age: 28
End: 2023-09-27

## 2023-09-27 DIAGNOSIS — R30.0 DYSURIA: Primary | ICD-10-CM

## 2023-09-27 DIAGNOSIS — R35.0 FREQUENCY OF URINATION: ICD-10-CM

## 2023-09-27 RX ORDER — CIPROFLOXACIN 250 MG/1
250 TABLET, FILM COATED ORAL 2 TIMES DAILY
Qty: 6 TABLET | Refills: 0 | Status: SHIPPED | OUTPATIENT
Start: 2023-09-27 | End: 2023-09-30

## 2023-09-30 DIAGNOSIS — G47.00 INSOMNIA, UNSPECIFIED TYPE: ICD-10-CM

## 2023-10-02 DIAGNOSIS — G47.00 INSOMNIA, UNSPECIFIED TYPE: ICD-10-CM

## 2023-10-02 RX ORDER — QUETIAPINE FUMARATE 50 MG/1
TABLET, FILM COATED ORAL NIGHTLY
Qty: 60 TABLET | Refills: 0 | Status: SHIPPED | OUTPATIENT
Start: 2023-10-02

## 2023-10-02 RX ORDER — QUETIAPINE FUMARATE 50 MG/1
100 TABLET, FILM COATED ORAL NIGHTLY
Qty: 60 TABLET | Refills: 0 | OUTPATIENT
Start: 2023-10-02

## 2023-10-02 NOTE — TELEPHONE ENCOUNTER
Called pt on 09/29/23 for appointment reminder for 10/03/23    Electronically signed by Shelly Mcghee MA on 10/2/2023 at 11:46 AM

## 2023-10-02 NOTE — TELEPHONE ENCOUNTER
strategies to manage symptoms. 10.Over 50% of the total visit time of   30  minutes was spent on counseling and/or coordination of care of:                         1. Moderate episode of recurrent major depressive disorder ([address])    2. Anxiety disorder, unspecified type    3. Chronic post-traumatic stress disorder (PTSD)    4. Insomnia, unspecified type    5.  Borderline personality disorder [facility])                        Psychotherapy Topics: mood/medication effectiveness family Clara Ahn, APRN - CNP

## 2023-10-03 ENCOUNTER — TELEPHONE (OUTPATIENT)
Dept: PSYCHIATRY | Age: 28
End: 2023-10-03

## 2023-10-03 NOTE — TELEPHONE ENCOUNTER
Called and lvm letting pt know that her script for quetiapine was sent to her pharmacy     Electronically signed by Elia Trevino on 10/3/2023 at 10:55 AM

## 2023-10-04 ENCOUNTER — TELEPHONE (OUTPATIENT)
Dept: PSYCHIATRY | Age: 28
End: 2023-10-04

## 2023-10-04 NOTE — TELEPHONE ENCOUNTER
Pt's mother called to cancel/reschedule appt for today 10/04/23 with Radamse Osborn because both of them are sick. Rescheduled 10/10/23 @ 3:30.     Electronically signed by Ailyn Marsh MA on 10/4/2023 at 1:11 PM

## 2023-10-06 ENCOUNTER — TELEPHONE (OUTPATIENT)
Dept: PSYCHIATRY | Age: 28
End: 2023-10-06

## 2023-10-06 NOTE — TELEPHONE ENCOUNTER
Called and lvm reminding pt of her appt for 10/10 @ 3:30 PM    Electronically signed by Robyn Ramon on 10/6/2023 at 1:40 PM

## 2023-10-09 DIAGNOSIS — R56.9 CONVULSIONS, UNSPECIFIED CONVULSION TYPE (HCC): ICD-10-CM

## 2023-10-09 DIAGNOSIS — R51.9 ACUTE NONINTRACTABLE HEADACHE, UNSPECIFIED HEADACHE TYPE: ICD-10-CM

## 2023-10-09 NOTE — TELEPHONE ENCOUNTER
Requested Prescriptions     Pending Prescriptions Disp Refills    topiramate (TOPAMAX) 50 MG tablet 60 tablet 5     Sig: TAKE ONE TABLET BY MOUTH EVERY MORNING AND TAKE TWO TABLETS BY MOUTH EVERY EVENING       Last Office Visit: 9/19/2023  Next Office Visit: 12/14/2023  Last Medication Refill: 9/19/23 with 5      Script sent on 9/19/23 with 5 ANEGLIKA

## 2023-10-10 ENCOUNTER — OFFICE VISIT (OUTPATIENT)
Dept: PSYCHIATRY | Age: 28
End: 2023-10-10
Payer: MEDICAID

## 2023-10-10 DIAGNOSIS — Z79.899 CONTROLLED SUBSTANCE AGREEMENT SIGNED: ICD-10-CM

## 2023-10-10 DIAGNOSIS — F60.3 BORDERLINE PERSONALITY DISORDER (HCC): ICD-10-CM

## 2023-10-10 DIAGNOSIS — F43.12 CHRONIC POST-TRAUMATIC STRESS DISORDER (PTSD): ICD-10-CM

## 2023-10-10 DIAGNOSIS — G47.00 INSOMNIA, UNSPECIFIED TYPE: ICD-10-CM

## 2023-10-10 DIAGNOSIS — F33.1 MODERATE EPISODE OF RECURRENT MAJOR DEPRESSIVE DISORDER (HCC): Primary | ICD-10-CM

## 2023-10-10 DIAGNOSIS — F41.9 ANXIETY DISORDER, UNSPECIFIED TYPE: ICD-10-CM

## 2023-10-10 DIAGNOSIS — K21.9 GASTROESOPHAGEAL REFLUX DISEASE WITHOUT ESOPHAGITIS: ICD-10-CM

## 2023-10-10 LAB
AMPHET UR QL SCN: NEGATIVE
BARBITURATES UR QL SCN: POSITIVE
BENZODIAZ UR QL SCN: NEGATIVE
BUPRENORPHINE URINE: NEGATIVE
CANNABINOIDS UR QL SCN: NEGATIVE
COCAINE UR QL SCN: NEGATIVE
DRUG SCREEN COMMENT UR-IMP: ABNORMAL
FENTANYL SCREEN, URINE: NEGATIVE
METHADONE UR QL SCN: NEGATIVE
METHAMPHETAMINE, URINE: NEGATIVE
OPIATES UR QL SCN: NEGATIVE
OXYCODONE UR QL SCN: NEGATIVE
PCP UR QL SCN: NEGATIVE
TRICYCLIC, URINE: POSITIVE

## 2023-10-10 PROCEDURE — 99214 OFFICE O/P EST MOD 30 MIN: CPT

## 2023-10-10 RX ORDER — PANTOPRAZOLE SODIUM 40 MG/1
40 TABLET, DELAYED RELEASE ORAL DAILY
Qty: 30 TABLET | Refills: 5 | Status: SHIPPED | OUTPATIENT
Start: 2023-10-10

## 2023-10-10 RX ORDER — TOPIRAMATE 50 MG/1
TABLET, FILM COATED ORAL
Qty: 60 TABLET | Refills: 5 | Status: SHIPPED | OUTPATIENT
Start: 2023-10-10

## 2023-10-10 ASSESSMENT — PATIENT HEALTH QUESTIONNAIRE - PHQ9
1. LITTLE INTEREST OR PLEASURE IN DOING THINGS: 1
8. MOVING OR SPEAKING SO SLOWLY THAT OTHER PEOPLE COULD HAVE NOTICED. OR THE OPPOSITE, BEING SO FIGETY OR RESTLESS THAT YOU HAVE BEEN MOVING AROUND A LOT MORE THAN USUAL: 1
SUM OF ALL RESPONSES TO PHQ QUESTIONS 1-9: 6
SUM OF ALL RESPONSES TO PHQ QUESTIONS 1-9: 6
6. FEELING BAD ABOUT YOURSELF - OR THAT YOU ARE A FAILURE OR HAVE LET YOURSELF OR YOUR FAMILY DOWN: 1
10. IF YOU CHECKED OFF ANY PROBLEMS, HOW DIFFICULT HAVE THESE PROBLEMS MADE IT FOR YOU TO DO YOUR WORK, TAKE CARE OF THINGS AT HOME, OR GET ALONG WITH OTHER PEOPLE: 1
9. THOUGHTS THAT YOU WOULD BE BETTER OFF DEAD, OR OF HURTING YOURSELF: 0
SUM OF ALL RESPONSES TO PHQ QUESTIONS 1-9: 6
SUM OF ALL RESPONSES TO PHQ9 QUESTIONS 1 & 2: 1
SUM OF ALL RESPONSES TO PHQ QUESTIONS 1-9: 6
7. TROUBLE CONCENTRATING ON THINGS, SUCH AS READING THE NEWSPAPER OR WATCHING TELEVISION: 2
3. TROUBLE FALLING OR STAYING ASLEEP: 0
2. FEELING DOWN, DEPRESSED OR HOPELESS: 0
5. POOR APPETITE OR OVEREATING: 0
4. FEELING TIRED OR HAVING LITTLE ENERGY: 1

## 2023-10-10 NOTE — PROGRESS NOTES
denies current or active suicidal and homicidal ideations, denies hallucinations, and no overt paranoia or delusions appreciated. She reports she is sleeping well with Seroquel. She canceled her trip to [place]. Today patient states, \"I have been feeling a little bit better, I think the Lia Vargas is doing okay, but I am still having the seizures. \"    Patient seen in office today, wearing casual clothing, appropriate to season. Her mother is present during interview with patient's permission. Patient is alert and oriented x 4. She is calm, cooperative, very pleasant. Patient and mother are very concerned about patient having seizure-like activity, multiple times a week. Mother reports patient just seems to \"space out, she gets very confused afterwards\". They have met with neurology last month, scans and testing have been ordered, will be in November, and will have follow-up with neurology in December to go over findings. Patient has discontinued Xanax, to have some rebound anxiety, the patient reports anxiety has been much more controlled lately. She denies any active or current suicidal or homicidal ideations, denies hallucinations, and no overt paranoia or delusions appreciated. She reports she is sleeping well Seroquel, has continued gabapentin to help with anxiety and the seizures. Recommended the patient begin psychotherapy with [facility] to help with anxiety, especially social anxiety, and also the possibility of PNES. Patient agreeable. We will follow up in 6 weeks. Absent  suicidal ideation. Reports compliance with medications as fair .      Sleep: sleeping okay    Caffeine use:  tea, coffee, occasional soda, occasional energy drink    PREVIOUS MED TRIALS  Xanax  Zoloft (N/V)  Seroquel  Paxil (SI)  Amitriptyline (SI)  Trazodone (N/V)  Mirtazapine (Nausea)  Prazosin (dizziness)  Pristiq (\"having pseudoseizures\")  Lamictal   Viibryd  Gabapentin       SUBSTANCE USE HISTORY  Alcohol:
Consuelo Burton

## 2023-10-15 DIAGNOSIS — F33.1 MODERATE EPISODE OF RECURRENT MAJOR DEPRESSIVE DISORDER (HCC): ICD-10-CM

## 2023-10-16 RX ORDER — VILAZODONE HYDROCHLORIDE 20 MG/1
20 TABLET ORAL DAILY
Qty: 30 TABLET | Refills: 1 | Status: SHIPPED | OUTPATIENT
Start: 2023-10-16 | End: 2023-12-15

## 2023-10-16 NOTE — TELEPHONE ENCOUNTER
Pharmacy sent a request to refill pt's medication       Last office visit : 10/10/2023 Michaela LAM  Next office visit : 11/17/2023 Michaela Twan LAM    Requested Prescriptions     Pending Prescriptions Disp Refills    vilazodone HCl (VIIBRYD) 10 MG TABS 46 tablet 0     Sig: Take 1 tablet by mouth daily for 14 days, THEN 2 tablets daily for 16 days. Niraj Contreras     10/10/23                                         Progress Note     Caden Urban 1995                            Chief Complaint   Patient presents with    Medication Check            Subjective:    Patient is a 34 y.o. female diagnosed with MDD, anxiety, PTSD, insomnia, BPD, and presents today for follow-up. Last seen in clinic on 9/11/2023  and prior records were reviewed. Last visit: \"I have been having pseudoseizures, ever since I started taking the Pristiq. \"   Patient seen in office today, wearing casual clothing, appropriate to season. She is dressed very nicely, make-up done, hair done. She is alert and oriented x 4. Patient was seen in the office less than 2 weeks ago, at that time we had initiated Pristiq to help with depressive symptoms and anxiety, however while this provider was out of office last week, patient's mother called requesting sooner appointment, due to patient having \"pseudoseizures\". Patient reports that she had these about 5 years ago, was worked up by neurology, diagnosed  with pseudoseizures. She reports she started having them when she started taking the Pristiq. Patient's mother joined patient during interview, with patient's permission, describing patient's seizures as Norma Munising starts spacing out, she draws up, she shaking, she cries sometimes, she is sweating, hums, and some lasted about 4 minutes. Patient reports she stopped taking Pristiq about 4 days ago, has not had a seizure in 2 days.   Patient does have a scheduled appointment with [facility] neurology in about 2 weeks, encouraged patient to get to

## 2023-11-02 ENCOUNTER — TELEPHONE (OUTPATIENT)
Dept: PSYCHIATRY | Age: 28
End: 2023-11-02

## 2023-11-02 DIAGNOSIS — F41.1 GAD (GENERALIZED ANXIETY DISORDER): ICD-10-CM

## 2023-11-02 DIAGNOSIS — G47.00 INSOMNIA, UNSPECIFIED TYPE: ICD-10-CM

## 2023-11-02 RX ORDER — GABAPENTIN 300 MG/1
300 CAPSULE ORAL 3 TIMES DAILY
Qty: 90 CAPSULE | Refills: 0 | Status: CANCELLED | OUTPATIENT
Start: 2023-11-02 | End: 2023-12-02

## 2023-11-02 RX ORDER — GABAPENTIN 300 MG/1
300 CAPSULE ORAL 3 TIMES DAILY
Qty: 90 CAPSULE | Refills: 0 | Status: SHIPPED | OUTPATIENT
Start: 2023-11-02 | End: 2023-12-02

## 2023-11-02 RX ORDER — QUETIAPINE FUMARATE 50 MG/1
100 TABLET, FILM COATED ORAL NIGHTLY
Qty: 60 TABLET | Refills: 0 | Status: SHIPPED | OUTPATIENT
Start: 2023-11-02

## 2023-11-02 NOTE — TELEPHONE ENCOUNTER
Pharmacy sent a request to refill pt's medication       Last office visit : 10/10/2023 Mica LAM  Next office visit : 11/2/2023 Mica Baez GENARO    Requested Prescriptions     Pending Prescriptions Disp Refills    QUEtiapine (SEROQUEL) 50 MG tablet 60 tablet 0     Sig: Take by mouth at bedtime    gabapentin (NEURONTIN) 300 MG capsule 90 capsule 0     Sig: Take 1 capsule by mouth 3 times daily for 30 days. Intended supply: 30 days Max Daily Amount: 900 mg            Shameca Ben       10/10/23                                         Progress Note     Algie Kawasaki 1995                            Chief Complaint   Patient presents with    Medication Check            Subjective:    Patient is a 34 y.o. female diagnosed with MDD, anxiety, PTSD, insomnia, BPD, and presents today for follow-up. Last seen in clinic on 9/11/2023  and prior records were reviewed. Last visit: \"I have been having pseudoseizures, ever since I started taking the Pristiq. \"   Patient seen in office today, wearing casual clothing, appropriate to season. She is dressed very nicely, make-up done, hair done. She is alert and oriented x 4. Patient was seen in the office less than 2 weeks ago, at that time we had initiated Pristiq to help with depressive symptoms and anxiety, however while this provider was out of office last week, patient's mother called requesting sooner appointment, due to patient having \"pseudoseizures\". Patient reports that she had these about 5 years ago, was worked up by neurology, diagnosed  with pseudoseizures. She reports she started having them when she started taking the Pristiq. Patient's mother joined patient during interview, with patient's permission, describing patient's seizures as Sara Ravi starts spacing out, she draws up, she shaking, she cries sometimes, she is sweating, hums, and some lasted about 4 minutes.   Patient reports she stopped taking Pristiq about 4 days ago, has not had a seizure in 2

## 2023-11-06 DIAGNOSIS — G43.109 MIGRAINE WITH AURA AND WITHOUT STATUS MIGRAINOSUS, NOT INTRACTABLE: ICD-10-CM

## 2023-11-06 DIAGNOSIS — R56.9 CONVULSIONS, UNSPECIFIED CONVULSION TYPE (HCC): ICD-10-CM

## 2023-11-06 RX ORDER — ATOGEPANT 60 MG/1
1 TABLET ORAL DAILY
Qty: 30 TABLET | Refills: 3 | Status: SHIPPED | OUTPATIENT
Start: 2023-11-06

## 2023-11-06 NOTE — TELEPHONE ENCOUNTER
Requested Prescriptions     Pending Prescriptions Disp Refills    QULIPTA 60 MG TABS [Pharmacy Med Name: Naeem Akhtar 60 MG TABS 60 Tablet] 30 tablet 3     Sig: Take 1 tablet by mouth daily       Last Office Visit: 9/19/2023  Next Office Visit: 12/14/2023  Last Medication Refill: 9/19/2023 with 3 RF

## 2023-11-14 DIAGNOSIS — G44.009 CLUSTER HEADACHE, NOT INTRACTABLE, UNSPECIFIED CHRONICITY PATTERN: ICD-10-CM

## 2023-11-14 DIAGNOSIS — R51.9 NONINTRACTABLE HEADACHE, UNSPECIFIED CHRONICITY PATTERN, UNSPECIFIED HEADACHE TYPE: ICD-10-CM

## 2023-11-14 RX ORDER — SUMATRIPTAN 6 MG/.5ML
6 INJECTION, SOLUTION SUBCUTANEOUS
Qty: 0.5 ML | Refills: 5 | Status: SHIPPED | OUTPATIENT
Start: 2023-11-14 | End: 2023-11-14

## 2023-11-14 NOTE — TELEPHONE ENCOUNTER
Requested Prescriptions     Pending Prescriptions Disp Refills    SUMAtriptan (IMITREX) 6 MG/0.5ML SOLN injection [Pharmacy Med Name: SUMATRIPTAN SUCCINATE 6 MG/ 6 Solution] 0.5 mL 5     Sig: Inject 0.5 mLs into the skin once as needed for Migraine       Last Office Visit: 9/19/2023  Next Office Visit: 12/14/2023  Last Medication Refill:2/9/2023 with 5 RF

## 2023-11-15 RX ORDER — BUTALBITAL, ASPIRIN, AND CAFFEINE 325; 50; 40 MG/1; MG/1; MG/1
1 CAPSULE ORAL EVERY 4 HOURS PRN
Qty: 15 CAPSULE | Refills: 1 | Status: SHIPPED | OUTPATIENT
Start: 2023-11-15 | End: 2023-12-15

## 2023-11-17 ENCOUNTER — TELEPHONE (OUTPATIENT)
Dept: PSYCHIATRY | Age: 28
End: 2023-11-17

## 2023-11-17 NOTE — TELEPHONE ENCOUNTER
Patient no showed to their appointment in the [facility]. Office called the patient to check on them and to reschedule their appointment. Left voicemail for patient to call the office back at [phone] Option 3.     Electronically signed by Isamar Lutz on 11/17/2023 at 3:30 PM

## 2023-11-20 ENCOUNTER — TELEPHONE (OUTPATIENT)
Dept: PSYCHIATRY | Age: 28
End: 2023-11-20

## 2023-11-20 DIAGNOSIS — F33.1 MODERATE EPISODE OF RECURRENT MAJOR DEPRESSIVE DISORDER (HCC): ICD-10-CM

## 2023-11-20 DIAGNOSIS — F41.0 PANIC ATTACK: ICD-10-CM

## 2023-11-20 RX ORDER — ALPRAZOLAM 1 MG/1
1 TABLET ORAL 3 TIMES DAILY PRN
Qty: 30 TABLET | Refills: 2 | Status: SHIPPED | OUTPATIENT
Start: 2023-11-20 | End: 2023-12-20

## 2023-11-20 NOTE — TELEPHONE ENCOUNTER
Called and lvm asking pt to return phone to schedule an appt     Electronically signed by Temo Ledezma on 11/20/2023 at 2:55 PM

## 2023-11-21 ENCOUNTER — TELEPHONE (OUTPATIENT)
Dept: PSYCHIATRY | Age: 28
End: 2023-11-21

## 2023-11-21 NOTE — TELEPHONE ENCOUNTER
Called and lvm asking pt to return phone call to schedule an appt.       Electronically signed by Radha Marti on 11/21/2023 at 11:35 AM

## 2023-11-22 ENCOUNTER — TELEPHONE (OUTPATIENT)
Dept: PSYCHIATRY | Age: 28
End: 2023-11-22

## 2023-11-22 RX ORDER — VILAZODONE HYDROCHLORIDE 20 MG/1
20 TABLET ORAL DAILY
Qty: 30 TABLET | Refills: 1 | OUTPATIENT
Start: 2023-11-22

## 2023-11-29 ENCOUNTER — TELEPHONE (OUTPATIENT)
Dept: PSYCHIATRY | Age: 28
End: 2023-11-29

## 2023-11-29 ENCOUNTER — OFFICE VISIT (OUTPATIENT)
Dept: FAMILY MEDICINE CLINIC | Age: 28
End: 2023-11-29
Payer: MEDICAID

## 2023-11-29 VITALS
OXYGEN SATURATION: 99 % | BODY MASS INDEX: 18.39 KG/M2 | TEMPERATURE: 97.7 F | WEIGHT: 97.4 LBS | SYSTOLIC BLOOD PRESSURE: 117 MMHG | HEART RATE: 115 BPM | HEIGHT: 61 IN | DIASTOLIC BLOOD PRESSURE: 70 MMHG

## 2023-11-29 DIAGNOSIS — Z23 NEED FOR INFLUENZA VACCINATION: ICD-10-CM

## 2023-11-29 DIAGNOSIS — N39.0 RECURRENT UTI: Primary | ICD-10-CM

## 2023-11-29 DIAGNOSIS — R10.2 PELVIC PAIN: ICD-10-CM

## 2023-11-29 DIAGNOSIS — N39.0 RECURRENT UTI: ICD-10-CM

## 2023-11-29 LAB
BILIRUB UR STRIP.AUTO-MCNC: NEGATIVE MG/DL
CLARITY UR: CLEAR
COLOR UR: YELLOW
GLUCOSE UR STRIP.AUTO-MCNC: NEGATIVE MG/DL
HGB UR STRIP.AUTO-MCNC: NEGATIVE MG/L
KETONES UR STRIP.AUTO-MCNC: NEGATIVE MG/DL
LEUKOCYTE ESTERASE UR QL STRIP.AUTO: NEGATIVE
NITRITE UR QL STRIP.AUTO: NEGATIVE
PH UR STRIP.AUTO: 6.5 [PH] (ref 5–8)
PROT UR STRIP.AUTO-MCNC: NEGATIVE MG/DL
SP GR UR STRIP.AUTO: 1.02 (ref 1–1.03)
UROBILINOGEN UR STRIP.AUTO-MCNC: 0.2 E.U./DL

## 2023-11-29 PROCEDURE — 99214 OFFICE O/P EST MOD 30 MIN: CPT | Performed by: FAMILY MEDICINE

## 2023-11-29 PROCEDURE — 90471 IMMUNIZATION ADMIN: CPT | Performed by: FAMILY MEDICINE

## 2023-11-29 PROCEDURE — 90674 CCIIV4 VAC NO PRSV 0.5 ML IM: CPT | Performed by: FAMILY MEDICINE

## 2023-11-29 RX ORDER — CIPROFLOXACIN 500 MG/1
500 TABLET, FILM COATED ORAL 2 TIMES DAILY
Qty: 6 TABLET | Refills: 0 | Status: SHIPPED | OUTPATIENT
Start: 2023-11-29 | End: 2023-12-02

## 2023-11-29 ASSESSMENT — ENCOUNTER SYMPTOMS
RESPIRATORY NEGATIVE: 1
ALLERGIC/IMMUNOLOGIC NEGATIVE: 1
GASTROINTESTINAL NEGATIVE: 1
EYES NEGATIVE: 1

## 2023-11-29 NOTE — TELEPHONE ENCOUNTER
Pt sent message stating that her hallucinations are starting to get worse, to the point where they are bothersome. She is not sleeping as well as usual. Can we up her Seroquel? Also maybe get an appointment as early as possible? Per Emma Jo called pt to make sure the hallucinations were commanding. Pt stated that they wasn't commanding.  Pt was schedule an appt for 11/29 @ 2:30 PM.    MA relayed this message to the provider     Electronically signed by Karine Thakkar on 11/29/2023 at 9:37 AM

## 2023-11-29 NOTE — PROGRESS NOTES
Endocrine: Negative. Genitourinary: Negative. Musculoskeletal: Negative. Skin: Negative. Allergic/Immunologic: Negative. Neurological: Negative. Hematological: Negative. Psychiatric/Behavioral: Negative. OBJECTIVE:    Physical Exam  Constitutional:       Appearance: Normal appearance. She is well-developed and well-groomed. HENT:      Head: Normocephalic and atraumatic. Right Ear: Tympanic membrane, ear canal and external ear normal. There is no impacted cerumen. Left Ear: Tympanic membrane, ear canal and external ear normal. There is no impacted cerumen. Nose: Nose normal.      Mouth/Throat:      Lips: Pink. Mouth: Mucous membranes are moist.      Dentition: Normal dentition. Pharynx: Oropharynx is clear. Uvula midline. Eyes:      General: Lids are normal.         Right eye: No discharge. Left eye: No discharge. Extraocular Movements: Extraocular movements intact. Conjunctiva/sclera: Conjunctivae normal.      Right eye: Right conjunctiva is not injected. Left eye: Left conjunctiva is not injected. Pupils: Pupils are equal, round, and reactive to light. Neck:      Thyroid: No thyromegaly. Vascular: No carotid bruit or JVD. Cardiovascular:      Rate and Rhythm: Normal rate and regular rhythm. Pulses: Normal pulses. Carotid pulses are 2+ on the right side and 2+ on the left side. Radial pulses are 2+ on the right side and 2+ on the left side. Heart sounds: Normal heart sounds, S1 normal and S2 normal. No murmur heard. Pulmonary:      Effort: Pulmonary effort is normal.      Breath sounds: Normal breath sounds. Abdominal:      General: Bowel sounds are normal. There is no distension or abdominal bruit. Palpations: Abdomen is soft. There is no mass. Hernia: No hernia is present. Musculoskeletal:         General: Normal range of motion.       Cervical back: Full passive range of motion

## 2023-11-29 NOTE — TELEPHONE ENCOUNTER
Pt called to cancel/reschedule her appt for today 11/29/23 because she has another appt that she had forgot about. MA asked pt if she was still having hallucinations, and pt stated yes but they are not commanding. They are not telling her to hurt herself or anyone around her. The are just annoying. MA told pt that if it gets worse to go to the ED. Pt stated that she understood. Patient was a late cancellation for their appointment in the [facility]. Patient's appointment was rescheduled. Discussed the three late cancellation and dismissal policies with the patient. Provided education that after three late cancellations, patients can be dismissed by the provider and practice. Patient verbally understood. Rescheduled for 11/30/23 @ 3:30.     Electronically signed by Josy Solano MA on 11/29/2023 at 4:52 PM

## 2023-11-30 ENCOUNTER — OFFICE VISIT (OUTPATIENT)
Dept: PSYCHIATRY | Age: 28
End: 2023-11-30
Payer: MEDICAID

## 2023-11-30 VITALS
OXYGEN SATURATION: 99 % | SYSTOLIC BLOOD PRESSURE: 107 MMHG | BODY MASS INDEX: 17.61 KG/M2 | WEIGHT: 93.3 LBS | HEART RATE: 96 BPM | TEMPERATURE: 97.8 F | HEIGHT: 61 IN | DIASTOLIC BLOOD PRESSURE: 79 MMHG

## 2023-11-30 DIAGNOSIS — Z79.899 LITHIUM USE: ICD-10-CM

## 2023-11-30 DIAGNOSIS — G47.00 INSOMNIA, UNSPECIFIED TYPE: ICD-10-CM

## 2023-11-30 DIAGNOSIS — F41.1 GAD (GENERALIZED ANXIETY DISORDER): ICD-10-CM

## 2023-11-30 DIAGNOSIS — F43.12 CHRONIC POST-TRAUMATIC STRESS DISORDER (PTSD): ICD-10-CM

## 2023-11-30 DIAGNOSIS — F39 UNSPECIFIED MOOD (AFFECTIVE) DISORDER (HCC): Primary | ICD-10-CM

## 2023-11-30 LAB — HCG UR QL: NEGATIVE

## 2023-11-30 PROCEDURE — 99214 OFFICE O/P EST MOD 30 MIN: CPT

## 2023-11-30 RX ORDER — QUETIAPINE FUMARATE 100 MG/1
200 TABLET, FILM COATED ORAL NIGHTLY
Qty: 60 TABLET | Refills: 0 | Status: SHIPPED | OUTPATIENT
Start: 2023-11-30

## 2023-11-30 RX ORDER — LITHIUM CARBONATE 150 MG/1
150 CAPSULE ORAL 2 TIMES DAILY WITH MEALS
Qty: 60 CAPSULE | Refills: 0 | Status: SHIPPED | OUTPATIENT
Start: 2023-11-30

## 2023-11-30 ASSESSMENT — PATIENT HEALTH QUESTIONNAIRE - PHQ9
10. IF YOU CHECKED OFF ANY PROBLEMS, HOW DIFFICULT HAVE THESE PROBLEMS MADE IT FOR YOU TO DO YOUR WORK, TAKE CARE OF THINGS AT HOME, OR GET ALONG WITH OTHER PEOPLE: 0
SUM OF ALL RESPONSES TO PHQ QUESTIONS 1-9: 9
5. POOR APPETITE OR OVEREATING: 0
SUM OF ALL RESPONSES TO PHQ QUESTIONS 1-9: 9
1. LITTLE INTEREST OR PLEASURE IN DOING THINGS: 0
7. TROUBLE CONCENTRATING ON THINGS, SUCH AS READING THE NEWSPAPER OR WATCHING TELEVISION: 3
9. THOUGHTS THAT YOU WOULD BE BETTER OFF DEAD, OR OF HURTING YOURSELF: 0
4. FEELING TIRED OR HAVING LITTLE ENERGY: 0
SUM OF ALL RESPONSES TO PHQ QUESTIONS 1-9: 9
8. MOVING OR SPEAKING SO SLOWLY THAT OTHER PEOPLE COULD HAVE NOTICED. OR THE OPPOSITE, BEING SO FIGETY OR RESTLESS THAT YOU HAVE BEEN MOVING AROUND A LOT MORE THAN USUAL: 3
3. TROUBLE FALLING OR STAYING ASLEEP: 3
6. FEELING BAD ABOUT YOURSELF - OR THAT YOU ARE A FAILURE OR HAVE LET YOURSELF OR YOUR FAMILY DOWN: 0
2. FEELING DOWN, DEPRESSED OR HOPELESS: 0
SUM OF ALL RESPONSES TO PHQ9 QUESTIONS 1 & 2: 0
SUM OF ALL RESPONSES TO PHQ QUESTIONS 1-9: 9

## 2023-11-30 ASSESSMENT — COLUMBIA-SUICIDE SEVERITY RATING SCALE - C-SSRS
4. HAVE YOU HAD THESE THOUGHTS AND HAD SOME INTENTION OF ACTING ON THEM?: NO
3. HAVE YOU BEEN THINKING ABOUT HOW YOU MIGHT KILL YOURSELF?: NO
7. DID THIS OCCUR IN THE LAST THREE MONTHS: NO
5. HAVE YOU STARTED TO WORK OUT OR WORKED OUT THE DETAILS OF HOW TO KILL YOURSELF? DO YOU INTEND TO CARRY OUT THIS PLAN?: NO

## 2023-11-30 NOTE — PROGRESS NOTES
generated by voice recognition computer software. Although all attempts are made to edit the dictation for accuracy, there may be errors in the transcription that are not intended.

## 2023-12-15 ENCOUNTER — OFFICE VISIT (OUTPATIENT)
Dept: FAMILY MEDICINE CLINIC | Age: 28
End: 2023-12-15
Payer: MEDICAID

## 2023-12-15 VITALS
OXYGEN SATURATION: 99 % | HEIGHT: 61 IN | SYSTOLIC BLOOD PRESSURE: 112 MMHG | WEIGHT: 97 LBS | HEART RATE: 107 BPM | TEMPERATURE: 97.6 F | BODY MASS INDEX: 18.31 KG/M2 | DIASTOLIC BLOOD PRESSURE: 68 MMHG

## 2023-12-15 DIAGNOSIS — Z79.899 LITHIUM USE: ICD-10-CM

## 2023-12-15 DIAGNOSIS — A08.4 VIRAL GASTROENTERITIS: Primary | ICD-10-CM

## 2023-12-15 DIAGNOSIS — F33.1 MODERATE EPISODE OF RECURRENT MAJOR DEPRESSIVE DISORDER (HCC): ICD-10-CM

## 2023-12-15 DIAGNOSIS — A08.4 VIRAL GASTROENTERITIS: ICD-10-CM

## 2023-12-15 DIAGNOSIS — Z79.899 LITHIUM USE: Primary | ICD-10-CM

## 2023-12-15 LAB
ALBUMIN SERPL-MCNC: 4.4 G/DL (ref 3.5–5.2)
ALP SERPL-CCNC: 76 U/L (ref 35–104)
ALT SERPL-CCNC: 51 U/L (ref 5–33)
ANION GAP SERPL CALCULATED.3IONS-SCNC: 20 MMOL/L (ref 7–19)
AST SERPL-CCNC: 52 U/L (ref 5–32)
BASOPHILS # BLD: 0.1 K/UL (ref 0–0.2)
BASOPHILS NFR BLD: 0.9 % (ref 0–1)
BILIRUB SERPL-MCNC: 0.7 MG/DL (ref 0.2–1.2)
BUN SERPL-MCNC: 14 MG/DL (ref 6–20)
CALCIUM SERPL-MCNC: 9.1 MG/DL (ref 8.6–10)
CHLORIDE SERPL-SCNC: 103 MMOL/L (ref 98–111)
CO2 SERPL-SCNC: 19 MMOL/L (ref 22–29)
CREAT SERPL-MCNC: 0.7 MG/DL (ref 0.5–0.9)
EOSINOPHIL # BLD: 0.1 K/UL (ref 0–0.6)
EOSINOPHIL NFR BLD: 0.9 % (ref 0–5)
ERYTHROCYTE [DISTWIDTH] IN BLOOD BY AUTOMATED COUNT: 13.3 % (ref 11.5–14.5)
GLUCOSE SERPL-MCNC: 90 MG/DL (ref 74–109)
HCT VFR BLD AUTO: 42 % (ref 37–47)
HGB BLD-MCNC: 13.1 G/DL (ref 12–16)
IMM GRANULOCYTES # BLD: 0 K/UL
INFLUENZA A ANTIBODY: NORMAL
INFLUENZA B ANTIBODY: NORMAL
LITHIUM SERPL-MCNC: 1.6 MMOL/L (ref 0.6–1.2)
LYMPHOCYTES # BLD: 1.8 K/UL (ref 1.1–4.5)
LYMPHOCYTES NFR BLD: 32.4 % (ref 20–40)
MCH RBC QN AUTO: 32.5 PG (ref 27–31)
MCHC RBC AUTO-ENTMCNC: 31.2 G/DL (ref 33–37)
MCV RBC AUTO: 104.2 FL (ref 81–99)
MONOCYTES # BLD: 0.3 K/UL (ref 0–0.9)
MONOCYTES NFR BLD: 5.1 % (ref 0–10)
NEUTROPHILS # BLD: 3.3 K/UL (ref 1.5–7.5)
NEUTS SEG NFR BLD: 60.3 % (ref 50–65)
PLATELET # BLD AUTO: 257 K/UL (ref 130–400)
PMV BLD AUTO: 10 FL (ref 9.4–12.3)
POTASSIUM SERPL-SCNC: 4 MMOL/L (ref 3.5–5)
PROT SERPL-MCNC: 7.3 G/DL (ref 6.6–8.7)
RBC # BLD AUTO: 4.03 M/UL (ref 4.2–5.4)
SARS-COV-2 N GENE RESP QL NAA+PROBE: NOT DETECTED
SODIUM SERPL-SCNC: 142 MMOL/L (ref 136–145)
WBC # BLD AUTO: 5.4 K/UL (ref 4.8–10.8)

## 2023-12-15 PROCEDURE — 99214 OFFICE O/P EST MOD 30 MIN: CPT | Performed by: FAMILY MEDICINE

## 2023-12-15 RX ORDER — VILAZODONE HYDROCHLORIDE 20 MG/1
20 TABLET ORAL DAILY
Qty: 30 TABLET | Refills: 2 | Status: SHIPPED | OUTPATIENT
Start: 2023-12-15

## 2023-12-15 RX ORDER — PROMETHAZINE HYDROCHLORIDE 25 MG/1
25 TABLET ORAL EVERY 6 HOURS PRN
Qty: 30 TABLET | Refills: 0 | Status: SHIPPED | OUTPATIENT
Start: 2023-12-15 | End: 2023-12-23

## 2023-12-15 NOTE — PROGRESS NOTES
SUBJECTIVE:    Patient ID: Frida Barkley is a 34 y. o.female. HPI:   Patient here for evaluation of acute illness  Patient 82-year-old female. She complains of a 24 hours of abdominal pain associated with nausea and vomiting. Mom have symptoms similar. Zofran not helping. Subjective fevers and chills. Past Medical History:   Diagnosis Date    Allergic rhinitis       Current Outpatient Medications   Medication Sig Dispense Refill    promethazine (PHENERGAN) 25 MG tablet Take 1 tablet by mouth every 6 hours as needed for Nausea 30 tablet 0    lithium 150 MG capsule Take 1 capsule by mouth 2 times daily (with meals) 60 capsule 0    QUEtiapine (SEROQUEL) 100 MG tablet Take 2 tablets by mouth nightly 60 tablet 0    ALPRAZolam (XANAX) 1 MG tablet Take 1 tablet by mouth 3 times daily as needed for Sleep for up to 30 days. 30 tablet 2    butalbital-aspirin-caffeine (FIORINAL) -40 MG capsule Take 1 capsule by mouth every 4 hours as needed for Headaches for up to 30 days.  Max Daily Amount: 6 capsules 15 capsule 1    QULIPTA 60 MG TABS Take 1 tablet by mouth daily 30 tablet 3    topiramate (TOPAMAX) 50 MG tablet TAKE ONE TABLET BY MOUTH EVERY MORNING AND TAKE TWO TABLETS BY MOUTH EVERY EVENING 60 tablet 5    pantoprazole (PROTONIX) 40 MG tablet Take 1 tablet by mouth daily 30 tablet 5    ondansetron (ZOFRAN-ODT) 4 MG disintegrating tablet Take 1 tablet by mouth 3 times daily as needed for Nausea or Vomiting 21 tablet 2    colestipol (COLESTID) 1 g tablet Take 1 tablet by mouth 2 times daily 60 tablet 3    ondansetron (ZOFRAN) 4 MG tablet Take 1 tablet by mouth every 8 hours as needed for Nausea or Vomiting 30 tablet 2    naloxone 4 MG/0.1ML LIQD nasal spray 1 spray by Nasal route as needed for Opioid Reversal 1 each 5    loperamide (IMODIUM) 2 MG capsule Take 1 capsule by mouth as needed for Diarrhea      butalbital-APAP-caffeine -40 MG CAPS per capsule TAKE ONE CAPSULE BY MOUTH EVERY 4 HOURS AS

## 2023-12-15 NOTE — TELEPHONE ENCOUNTER
Pharmacy sent a request to refill pt's medication       Last office visit : 11/30/2023 Doris LAM  Next office visit : 12/15/2023 Snehalmine Rebecca LAM    Requested Prescriptions     Pending Prescriptions Disp Refills    VIIBRYD 20 MG Tablet [Pharmacy Med Name: VIIBRYD 20 MG TAB 20 Tablet] 30 tablet 5     Sig: Take 1 tablet by mouth daily            Niraj Contreras       11/30/23                                         Progress Note     Debora Mendoza 1995                            Chief Complaint   Patient presents with    Medication Check            Subjective:    Patient is a 34 y.o. female diagnosed with MDD, anxiety, PTSD, insomnia, BPD, and presents today for follow-up. Last seen in clinic on 10/10/23  and prior records were reviewed. Last visit: \"I have been feeling a little bit better, I think the Edie Puller is doing okay, but I am still having the seizures. \"   Patient seen in office today, wearing casual clothing, appropriate to season. Her mother is present during interview with patient's permission. Patient is alert and oriented x 4. She is calm, cooperative, very pleasant. Patient and mother are very concerned about patient having seizure-like activity, multiple times a week. Mother reports patient just seems to \"space out, she gets very confused afterwards\". They have met with neurology last month, scans and testing have been ordered, will be in November, and will have follow-up with neurology in December to go over findings. Patient has discontinued Xanax, to have some rebound anxiety, the patient reports anxiety has been much more controlled lately. She denies any active or current suicidal or homicidal ideations, denies hallucinations, and no overt paranoia or delusions appreciated. She reports she is sleeping well Seroquel, has continued gabapentin to help with anxiety and the seizures.   Recommended the patient begin psychotherapy with [facility] to help with anxiety, especially

## 2023-12-18 DIAGNOSIS — Z79.899 LITHIUM USE: ICD-10-CM

## 2023-12-18 DIAGNOSIS — A08.4 VIRAL GASTROENTERITIS: ICD-10-CM

## 2023-12-18 LAB
LIPASE SERPL-CCNC: 41 U/L (ref 13–60)
LITHIUM SERPL-MCNC: 0.1 MMOL/L (ref 0.6–1.2)

## 2023-12-19 DIAGNOSIS — F33.1 MODERATE EPISODE OF RECURRENT MAJOR DEPRESSIVE DISORDER (HCC): ICD-10-CM

## 2023-12-19 RX ORDER — VILAZODONE HYDROCHLORIDE 20 MG/1
20 TABLET ORAL DAILY
Qty: 30 TABLET | Refills: 2 | OUTPATIENT
Start: 2023-12-19

## 2023-12-22 DIAGNOSIS — Z79.899 LITHIUM USE: ICD-10-CM

## 2023-12-22 LAB — LITHIUM SERPL-MCNC: 0.1 MMOL/L (ref 0.6–1.2)

## 2023-12-26 ENCOUNTER — TELEPHONE (OUTPATIENT)
Dept: PSYCHIATRY | Age: 28
End: 2023-12-26

## 2023-12-26 DIAGNOSIS — Z79.899 HIGH RISK MEDICATION USE: Primary | ICD-10-CM

## 2023-12-26 NOTE — TELEPHONE ENCOUNTER
Contacted pt as follow up to her restarting the Lithium. She stated that she is taking the med 2 times a day without any problems.  She was suppose to get a Lithium level after on the med 2 times day for 7 days.  She was informed to restart the Lithium on 12/19/23 and got the Lithium level repeated on 12/22/23.  She stated she got the lab done early due to Kevin.  Lithium level obtained on 12/22/23 was 0.10.  WILLIAN LAM, covering for DEBORAH LAM, was given the above info and per his direction, pt informed that the lab was too early and requested that she get the Lithium level repeated on 1/2/24-pt reminded to hold am dose of [place] on am of lab draw-may take after lab drawn.  Pt verbalized understanding and plans to follow.

## 2023-12-28 DIAGNOSIS — G47.00 INSOMNIA, UNSPECIFIED TYPE: ICD-10-CM

## 2023-12-29 NOTE — TELEPHONE ENCOUNTER
04/28/2023 11:10 AM     GLUCOSE 86 04/28/2023 11:10 AM     CALCIUM 9.0 04/28/2023 11:10 AM      No results found for: \"CHOL\"  No results found for: \"TRIG\"  No results found for: \"HDL\"  No results found for: \"LDLCHOLESTEROL\", \"LDLCALC\"  No results found for: \"LABVLDL\", \"VLDL\"  No results found for: \"CHOLHDLRATIO\"        Lab Results   Component Value Date     LABA1C 4.7 04/28/2023      No results found for: \"EAG\"        Lab Results   Component Value Date     TSHFT4 2.20 03/15/2023     TSH 2.430 03/01/2022      No results found for: \"VITD25\"  No results found for: \"NWUUZVDD10\"   No results found for: \"FOLATE\"      Assessment:    1. Lithium use    2. Insomnia, unspecified type    3. Chronic post-traumatic stress disorder (PTSD)    4. RIVER (generalized anxiety disorder)                No evidence of acute suicidality, homicidality or psychosis observed.  Patient is psychiatrically stable     Plan:     1.    Continue  Viibryd 20 mg daily for depression  Seroquel, increase to 200 mg nightly for sleep/hallucinations  Gabapentin 300 mg TID for anxiety        Start   Lithium 150 mg BID with meals for mood stabilization  Lithium must be maintained within a narrow therapeutic range. Once started on Lithium, blood levels will be drawn one week after starting Lithium and one week after each dose change. Signs/symptoms of Lithium overdose are most commonly nausea, vomiting, diarrhea. If any of these things occur while taking Lithium, call this provider. If it is after office hours or on the weekend, and you know you are not otherwise sick, stop [place] and call the office during regular business hours. Other medications or supplements may affect Lithium levels. It is important when starting or stopping a medication or supplement that you inform this provider and Lithium levels may be drawn to verify that the Lithium level is within therapeutic range.    While you are taking Lithium, do not take NSAID (non-steroidal

## 2024-01-02 ENCOUNTER — TELEPHONE (OUTPATIENT)
Dept: PSYCHIATRY | Age: 29
End: 2024-01-02

## 2024-01-02 RX ORDER — QUETIAPINE FUMARATE 100 MG/1
200 TABLET, FILM COATED ORAL NIGHTLY
Qty: 60 TABLET | Refills: 0 | Status: SHIPPED | OUTPATIENT
Start: 2024-01-02

## 2024-01-02 NOTE — TELEPHONE ENCOUNTER
Called and lvm letting pt know that her script for quetiapine was sent to her pharmacy     Electronically signed by Niraj Contreras on 1/2/2024 at 10:49 AM

## 2024-01-02 NOTE — TELEPHONE ENCOUNTER
Called patient to remind them of their appointment   -Pt confirmed     Reminded patient to complete their visit pre-check/digital registration in [employer].    Electronically signed by Josselyn Teran MA on 1/2/2024 at 2:52 PM

## 2024-01-03 ENCOUNTER — TELEPHONE (OUTPATIENT)
Dept: PSYCHIATRY | Age: 29
End: 2024-01-03

## 2024-01-03 NOTE — TELEPHONE ENCOUNTER
Pt called to cancel/reschedule her appt for today 01/03/24 with Anamika Monique because she has a migraine.    Rescheduled for 01/09/24 @ 4:00.    Electronically signed by Giuliana Lakhani MA on 1/3/2024 at 10:02 AM

## 2024-01-08 ENCOUNTER — OFFICE VISIT (OUTPATIENT)
Dept: FAMILY MEDICINE CLINIC | Age: 29
End: 2024-01-08
Payer: MEDICAID

## 2024-01-08 VITALS
DIASTOLIC BLOOD PRESSURE: 60 MMHG | SYSTOLIC BLOOD PRESSURE: 100 MMHG | OXYGEN SATURATION: 99 % | HEART RATE: 107 BPM | HEIGHT: 61 IN | BODY MASS INDEX: 18.12 KG/M2 | WEIGHT: 96 LBS | TEMPERATURE: 99 F

## 2024-01-08 DIAGNOSIS — Z79.899 HIGH RISK MEDICATION USE: ICD-10-CM

## 2024-01-08 DIAGNOSIS — J02.9 SORE THROAT: Primary | ICD-10-CM

## 2024-01-08 LAB
B PARAP IS1001 DNA NPH QL NAA+NON-PROBE: NOT DETECTED
B PERT.PT PRMT NPH QL NAA+NON-PROBE: NOT DETECTED
C PNEUM DNA NPH QL NAA+NON-PROBE: NOT DETECTED
FLUAV RNA NPH QL NAA+NON-PROBE: NOT DETECTED
FLUBV RNA NPH QL NAA+NON-PROBE: NOT DETECTED
HADV DNA NPH QL NAA+NON-PROBE: NOT DETECTED
HCOV 229E RNA NPH QL NAA+NON-PROBE: NOT DETECTED
HCOV HKU1 RNA NPH QL NAA+NON-PROBE: NOT DETECTED
HCOV NL63 RNA NPH QL NAA+NON-PROBE: NOT DETECTED
HCOV OC43 RNA NPH QL NAA+NON-PROBE: NOT DETECTED
HMPV RNA NPH QL NAA+NON-PROBE: NOT DETECTED
HPIV1 RNA NPH QL NAA+NON-PROBE: NOT DETECTED
HPIV2 RNA NPH QL NAA+NON-PROBE: NOT DETECTED
HPIV3 RNA NPH QL NAA+NON-PROBE: NOT DETECTED
HPIV4 RNA NPH QL NAA+NON-PROBE: NOT DETECTED
LITHIUM SERPL-MCNC: 0.3 MMOL/L (ref 0.6–1.2)
M PNEUMO DNA NPH QL NAA+NON-PROBE: NOT DETECTED
RSV RNA NPH QL NAA+NON-PROBE: NOT DETECTED
RV+EV RNA NPH QL NAA+NON-PROBE: NOT DETECTED
S PYO AG THROAT QL: NORMAL
SARS-COV-2 RNA NPH QL NAA+NON-PROBE: NOT DETECTED

## 2024-01-08 PROCEDURE — 99213 OFFICE O/P EST LOW 20 MIN: CPT | Performed by: NURSE PRACTITIONER

## 2024-01-08 RX ORDER — AZITHROMYCIN 250 MG/1
250 TABLET, FILM COATED ORAL SEE ADMIN INSTRUCTIONS
Qty: 6 TABLET | Refills: 0 | Status: SHIPPED | OUTPATIENT
Start: 2024-01-08 | End: 2024-01-13

## 2024-01-08 ASSESSMENT — ENCOUNTER SYMPTOMS
DIARRHEA: 1
VOMITING: 1
COUGH: 0
NAUSEA: 1
SORE THROAT: 1

## 2024-01-08 NOTE — PROGRESS NOTES
SUBJECTIVE:  Stomach issues , sore throat   Patient ID: Sherie Renae is a 28 y.o. female.    HPI:   Pharyngitis  Associated symptoms include chest pain, chills, congestion, myalgias, nausea, a sore throat and vomiting. Pertinent negatives include no coughing, fever or headaches.   Diarrhea   Associated symptoms include chills, myalgias and vomiting. Pertinent negatives include no coughing, fever or headaches.      Started 3-4 days with the stomach. Diarrhea nausea chest hurting and then sore throat.   No fever but has had chills. No headache, no cough   Has had body aches, no rash.   Dad sick He has had cough congestion.   Past Medical History:   Diagnosis Date    Allergic rhinitis       Prior to Visit Medications    Medication Sig Taking? Authorizing Provider   azithromycin (ZITHROMAX) 250 MG tablet Take 1 tablet by mouth See Admin Instructions for 5 days 500mg on day 1 followed by 250mg on days 2 - 5 Yes Cathy Portillo APRN   QUEtiapine (SEROQUEL) 100 MG tablet Take 2 tablets by mouth nightly Yes Nasir Montoya APRN - CNP   butalbital-aspirin-caffeine (FIORINAL) -40 MG capsule Take 1 capsule by mouth every 4 hours as needed for Headaches for up to 30 days. Max Daily Amount: 6 capsules Yes Valente Le MD   VIIBRYD 20 MG Tablet Take 1 tablet by mouth daily Yes Anamika Monique APRN - CNP   lithium 150 MG capsule Take 1 capsule by mouth 2 times daily (with meals) Yes Anamika Monique APRN - CNP   QULIPTA 60 MG TABS Take 1 tablet by mouth daily Yes Kristy Alvarado APRN - CNP   topiramate (TOPAMAX) 50 MG tablet TAKE ONE TABLET BY MOUTH EVERY MORNING AND TAKE TWO TABLETS BY MOUTH EVERY EVENING Yes Kristy Alvarado APRN - CNP   pantoprazole (PROTONIX) 40 MG tablet Take 1 tablet by mouth daily Yes Valente Le MD   ondansetron (ZOFRAN-ODT) 4 MG disintegrating tablet Take 1 tablet by mouth 3 times daily as needed for Nausea or Vomiting Yes Rolando Lozoya APRN   colestipol

## 2024-01-10 ENCOUNTER — TELEPHONE (OUTPATIENT)
Dept: PSYCHIATRY | Age: 29
End: 2024-01-10

## 2024-01-10 DIAGNOSIS — F41.1 GAD (GENERALIZED ANXIETY DISORDER): ICD-10-CM

## 2024-01-10 RX ORDER — GABAPENTIN 300 MG/1
300 CAPSULE ORAL 3 TIMES DAILY
Qty: 90 CAPSULE | Refills: 0 | Status: SHIPPED | OUTPATIENT
Start: 2024-01-10 | End: 2024-02-09

## 2024-01-10 NOTE — TELEPHONE ENCOUNTER
counseling to help with anxiety, especially social anxiety, and also the possibility of PNES.  Patient agreeable.  We will follow up in 6 weeks.     Today patient states, \"I called to come in a little sooner. I've been having hallucinations, hearing voices that sound like they are in the next room.\"     Patient seen in office today, wearing dress clothing, appropriate to season.  She is alert and oriented x 4.  She is calm, cooperative, very pleasant.  She reports overall her mood is been doing okay, report compliance with her Viibryd.  She feels it has worked well for depressive features.  She reports however \"I was manic the last week, doing a lot of cleaning, talking a lot, lots of energy, not sleeping maybe about 8 hours for the week, was trying to spend $1600 on VIP concert tickets. I feel like I may be starting to crash now\".  We discussed initializing mood stabilizer to help with this, we discussed a few options, patient might initialize lithium, initialize low-dose, discussed benefits, risk, and need for serum level to be drawn often, patient still agreeable to start.  We will order urine pregnancy test today to verify status, patient reports she has an IUD.  Discussed patient to have blood drawn in about 1 week, educated to hold morning dose when she has serum level drawn they have, patient verbalized understanding of this.  Discussed need to increase fluid intake, reports dehydrated on medication, educated to take with food.  We will increase her Seroquel nightly to help with her hallucinations.  She denies current or active suicidal and homicidal ideations, denies current hallucinations, and no overt paranoia or delusions appreciated.  We will follow up in 1 month.     Absent  suicidal ideation.    Reports compliance with medications as fair .      Sleep: sleeping okay     Caffeine use:  tea, coffee, occasional soda, occasional energy drink     PREVIOUS MED TRIALS  Xanax  Zoloft (N/V)  Seroquel  Paxil

## 2024-01-10 NOTE — TELEPHONE ENCOUNTER
Called and lvm letting pt know that her script for gabapentin was sent to her pharmacy     Also let pt know that Anamika LAM got her lithium level back, it is slightly low at 0.30 but is expected and good range for her dose.      Electronically signed by Niraj Contreras on 1/10/2024 at 4:28 PM

## 2024-01-15 ENCOUNTER — TELEPHONE (OUTPATIENT)
Dept: PSYCHIATRY | Age: 29
End: 2024-01-15

## 2024-01-15 NOTE — TELEPHONE ENCOUNTER
Appointment For: Sherie Renae ([id number])   Visit Type: FOLLOW UP (1002)      1/16/2024    3:30 PM  30 mins.  [facility]      Patient Comments:      ----------------------------------   This appointment rescheduled from:   1/9/2024     4:00 PM  30 mins.  [facility]     Pt cancel/rescheduled appt through [employer].    Electronically signed by Giuliana Lakhani MA on 1/15/2024 at 8:10 AM

## 2024-01-15 NOTE — TELEPHONE ENCOUNTER
Called patient to remind them of their appointment   -Pt confirmed     Reminded patient to complete their visit pre-check/digital registration in [employer].    Electronically signed by Josselyn Teran MA on 1/15/2024 at 2:05 PM

## 2024-01-16 ENCOUNTER — OFFICE VISIT (OUTPATIENT)
Dept: PSYCHIATRY | Age: 29
End: 2024-01-16
Payer: MEDICAID

## 2024-01-16 VITALS
HEIGHT: 61 IN | DIASTOLIC BLOOD PRESSURE: 78 MMHG | SYSTOLIC BLOOD PRESSURE: 122 MMHG | TEMPERATURE: 97.8 F | BODY MASS INDEX: 17.59 KG/M2 | WEIGHT: 93.2 LBS | OXYGEN SATURATION: 100 % | HEART RATE: 124 BPM

## 2024-01-16 DIAGNOSIS — F41.1 GAD (GENERALIZED ANXIETY DISORDER): ICD-10-CM

## 2024-01-16 DIAGNOSIS — G47.00 INSOMNIA, UNSPECIFIED TYPE: ICD-10-CM

## 2024-01-16 DIAGNOSIS — F34.9 PERSISTENT MOOD (AFFECTIVE) DISORDER, UNSPECIFIED (HCC): Primary | ICD-10-CM

## 2024-01-16 DIAGNOSIS — F60.3 BORDERLINE PERSONALITY DISORDER (HCC): ICD-10-CM

## 2024-01-16 DIAGNOSIS — Z79.899 LITHIUM USE: ICD-10-CM

## 2024-01-16 DIAGNOSIS — F43.12 CHRONIC POST-TRAUMATIC STRESS DISORDER (PTSD): ICD-10-CM

## 2024-01-16 PROCEDURE — 99214 OFFICE O/P EST MOD 30 MIN: CPT

## 2024-01-16 RX ORDER — VILAZODONE HYDROCHLORIDE 20 MG/1
20 TABLET ORAL DAILY
Qty: 30 TABLET | Refills: 2 | Status: SHIPPED | OUTPATIENT
Start: 2024-01-16

## 2024-01-16 RX ORDER — LITHIUM CARBONATE 150 MG/1
150 CAPSULE ORAL 2 TIMES DAILY WITH MEALS
Qty: 60 CAPSULE | Refills: 1 | Status: SHIPPED | OUTPATIENT
Start: 2024-01-16

## 2024-01-16 RX ORDER — QUETIAPINE FUMARATE 100 MG/1
200 TABLET, FILM COATED ORAL NIGHTLY
Qty: 60 TABLET | Refills: 1 | Status: SHIPPED | OUTPATIENT
Start: 2024-01-16

## 2024-01-16 ASSESSMENT — PATIENT HEALTH QUESTIONNAIRE - PHQ9
4. FEELING TIRED OR HAVING LITTLE ENERGY: 0
2. FEELING DOWN, DEPRESSED OR HOPELESS: 0
5. POOR APPETITE OR OVEREATING: 0
SUM OF ALL RESPONSES TO PHQ QUESTIONS 1-9: 2
9. THOUGHTS THAT YOU WOULD BE BETTER OFF DEAD, OR OF HURTING YOURSELF: 0
6. FEELING BAD ABOUT YOURSELF - OR THAT YOU ARE A FAILURE OR HAVE LET YOURSELF OR YOUR FAMILY DOWN: 0
10. IF YOU CHECKED OFF ANY PROBLEMS, HOW DIFFICULT HAVE THESE PROBLEMS MADE IT FOR YOU TO DO YOUR WORK, TAKE CARE OF THINGS AT HOME, OR GET ALONG WITH OTHER PEOPLE: 0
SUM OF ALL RESPONSES TO PHQ QUESTIONS 1-9: 2
SUM OF ALL RESPONSES TO PHQ QUESTIONS 1-9: 2
7. TROUBLE CONCENTRATING ON THINGS, SUCH AS READING THE NEWSPAPER OR WATCHING TELEVISION: 1
3. TROUBLE FALLING OR STAYING ASLEEP: 1
8. MOVING OR SPEAKING SO SLOWLY THAT OTHER PEOPLE COULD HAVE NOTICED. OR THE OPPOSITE, BEING SO FIGETY OR RESTLESS THAT YOU HAVE BEEN MOVING AROUND A LOT MORE THAN USUAL: 0
SUM OF ALL RESPONSES TO PHQ9 QUESTIONS 1 & 2: 0
1. LITTLE INTEREST OR PLEASURE IN DOING THINGS: 0
SUM OF ALL RESPONSES TO PHQ QUESTIONS 1-9: 2

## 2024-01-16 NOTE — PROGRESS NOTES
Review of Systems - 14 point review:  Negative     Constitutional: (fevers, chills, night sweats, wt loss/gain, change in appetite, fatigue, somnolence)    HEENT: (ear pain or discharge, hearing loss, ear ringing, sinus pressure, nosebleed, nasal discharge, sore throat, oral sores, tooth pain, bleeding gums, hoarse voice, neck pain)      Cardiovascular: (HTN, chest pain, elevated cholesterol/lipids, palpitations, leg swelling, leg pain with walking)    Respiratory: (cough, wheezing, snoring, SOB with activity (dyspnea), SOB while lying flat (orthopnea), awakening with severe SOB (paroxysmal nocturnal dyspnea))    Gastrointestinal: (NVD, constipation, abdominal pain, bright red stools, black tarry stools, stool incontinence)     Genitourinary:  (pelvic pain, burning or frequency of urination, urinary urgency, blood in urine incomplete bladder emptying, urinary incontinence, STD; MEN: testicular pain or swelling, erectile dysfunction; WOMEN: LMP, heavy menstrual bleeding (menorrhagia), irregular periods, postmenopausal bleeding, menstrual pain (dymenorrhea, vaginal discharge)    Musculoskeletal: (bone pain/fracture, joint pain or swelling, musle pain)    Integumentary: (rashes, acne, non-healing sores, itching, breast lumps, breast pain, nipple discharge, hair loss)    Neurologic: (HA, muscle weakness, paresthesias (numbness, coldness, crawling or prickling), memory loss, seizure (possible PNES), dizziness)    Psychiatric:  (anxiety, sadness, irritability/anger, insomnia, suicidality)    Endocrine: (heat or cold intolerance, excessive thirst (polydipsia), excessive hunger (polyphagia))    Immune/Allergic: (hives, seasonal or environmental allergies, HIV exposure)    Hematologic/Lymphatic: (lymph node enlargement, easy bleeding or bruising)    History obtained via chart review and patient    PCP is Valente Le MD       Current Meds:    Prior to Admission medications    Medication Sig Start Date End Date Taking?

## 2024-01-17 ENCOUNTER — TELEPHONE (OUTPATIENT)
Dept: PSYCHIATRY | Age: 29
End: 2024-01-17

## 2024-01-17 NOTE — TELEPHONE ENCOUNTER
Appointment For: Sherie Renae ([id number])   Visit Type: FOLLOW UP (1002)      2/13/2024   11:00 AM  30 mins.  [facility]      Patient Comments:   I have appt. at 9:30 for 30 min at same hospital so time works better   ----------------------------------   This appointment rescheduled from:   2/13/2024   11:30 AM  30 mins.  [facility]     Pt cancel/rescheduled appt through [employer]

## 2024-01-24 DIAGNOSIS — F33.1 MODERATE EPISODE OF RECURRENT MAJOR DEPRESSIVE DISORDER (HCC): ICD-10-CM

## 2024-01-24 RX ORDER — LAMOTRIGINE 25 MG/1
TABLET ORAL
Qty: 78 TABLET | Refills: 0 | OUTPATIENT
Start: 2024-01-24 | End: 2024-02-22

## 2024-01-31 DIAGNOSIS — F34.9 PERSISTENT MOOD (AFFECTIVE) DISORDER, UNSPECIFIED (HCC): ICD-10-CM

## 2024-01-31 DIAGNOSIS — G47.00 INSOMNIA, UNSPECIFIED TYPE: ICD-10-CM

## 2024-01-31 NOTE — TELEPHONE ENCOUNTER
Pharmacy sent a request to refill pt's medication       Last office visit : 1/16/2024 S Kayden LAM  Next office visit : 2/13/2024 S Kayden LAM    Requested Prescriptions     Pending Prescriptions Disp Refills    QUEtiapine (SEROQUEL) 100 MG tablet 60 tablet 1     Sig: Take 2 tablets by mouth nightly            Shameca Ben       1/16/24                                         Progress Note     Sherie Renae 1995                            Chief Complaint   Patient presents with    Medication Check            Subjective:    Patient is a 28 y.o. female diagnosed with persistent mood disorder, anxiety, PTSD, insomnia, BPD, and presents today for follow-up.  Last seen in clinic on 11/30/23  and prior records were reviewed.     Last visit: \"I called to come in a little sooner. I've been having hallucinations, hearing voices that sound like they are in the next room.\"   Patient seen in office today, wearing dress clothing, appropriate to season.  She is alert and oriented x 4.  She is calm, cooperative, very pleasant.  She reports overall her mood is been doing okay, report compliance with her Viibryd.  She feels it has worked well for depressive features.  She reports however \"I was manic the last week, doing a lot of cleaning, talking a lot, lots of energy, not sleeping maybe about 8 hours for the week, was trying to spend $1600 on VIP concert tickets. I feel like I may be starting to crash now\".  We discussed initializing mood stabilizer to help with this, we discussed a few options, patient might initialize lithium, initialize low-dose, discussed benefits, risk, and need for serum level to be drawn often, patient still agreeable to start.  We will order urine pregnancy test today to verify status, patient reports she has an IUD.  Discussed patient to have blood drawn in about 1 week, educated to hold morning dose when she has serum level drawn they have, patient verbalized understanding of this.  Discussed

## 2024-02-01 ENCOUNTER — TELEPHONE (OUTPATIENT)
Dept: PSYCHIATRY | Age: 29
End: 2024-02-01

## 2024-02-01 RX ORDER — QUETIAPINE FUMARATE 100 MG/1
200 TABLET, FILM COATED ORAL NIGHTLY
Qty: 60 TABLET | Refills: 1 | Status: SHIPPED | OUTPATIENT
Start: 2024-02-01

## 2024-02-12 ENCOUNTER — TELEPHONE (OUTPATIENT)
Dept: PSYCHIATRY | Age: 29
End: 2024-02-12

## 2024-02-12 NOTE — TELEPHONE ENCOUNTER
Called patient to remind them of their appointment   -Pt confirmed    Reminded patient to complete their visit pre-check/digital registration in [employer].    Electronically signed by Josselyn Teran MA on 2/12/2024 at 1:42 PM

## 2024-02-13 ENCOUNTER — OFFICE VISIT (OUTPATIENT)
Dept: OBGYN CLINIC | Age: 29
End: 2024-02-13
Payer: MEDICAID

## 2024-02-13 ENCOUNTER — OFFICE VISIT (OUTPATIENT)
Dept: PSYCHIATRY | Age: 29
End: 2024-02-13
Payer: MEDICAID

## 2024-02-13 VITALS
DIASTOLIC BLOOD PRESSURE: 77 MMHG | BODY MASS INDEX: 18.69 KG/M2 | HEART RATE: 83 BPM | HEIGHT: 61 IN | SYSTOLIC BLOOD PRESSURE: 106 MMHG | WEIGHT: 99 LBS

## 2024-02-13 VITALS
TEMPERATURE: 97.7 F | WEIGHT: 95 LBS | SYSTOLIC BLOOD PRESSURE: 111 MMHG | OXYGEN SATURATION: 100 % | HEIGHT: 61 IN | BODY MASS INDEX: 17.94 KG/M2 | HEART RATE: 89 BPM | DIASTOLIC BLOOD PRESSURE: 78 MMHG

## 2024-02-13 DIAGNOSIS — G47.00 INSOMNIA, UNSPECIFIED TYPE: ICD-10-CM

## 2024-02-13 DIAGNOSIS — F60.3 BORDERLINE PERSONALITY DISORDER (HCC): ICD-10-CM

## 2024-02-13 DIAGNOSIS — F33.1 MODERATE EPISODE OF RECURRENT MAJOR DEPRESSIVE DISORDER (HCC): Primary | ICD-10-CM

## 2024-02-13 DIAGNOSIS — Z76.89 ENCOUNTER TO ESTABLISH CARE: Primary | ICD-10-CM

## 2024-02-13 DIAGNOSIS — N92.1 MENORRHAGIA WITH IRREGULAR CYCLE: ICD-10-CM

## 2024-02-13 DIAGNOSIS — F41.1 GAD (GENERALIZED ANXIETY DISORDER): ICD-10-CM

## 2024-02-13 DIAGNOSIS — F43.12 CHRONIC POST-TRAUMATIC STRESS DISORDER (PTSD): ICD-10-CM

## 2024-02-13 DIAGNOSIS — Z30.431 INTRAUTERINE DEVICE SURVEILLANCE: ICD-10-CM

## 2024-02-13 DIAGNOSIS — N94.6 DYSMENORRHEA: ICD-10-CM

## 2024-02-13 PROCEDURE — 99203 OFFICE O/P NEW LOW 30 MIN: CPT

## 2024-02-13 PROCEDURE — 99214 OFFICE O/P EST MOD 30 MIN: CPT

## 2024-02-13 RX ORDER — MISOPROSTOL 100 UG/1
TABLET ORAL
Qty: 4 TABLET | Refills: 0 | Status: SHIPPED | OUTPATIENT
Start: 2024-02-13

## 2024-02-13 NOTE — PROGRESS NOTES
Pt is here for a new patient visit. She states that she needs to have her mirena changed.  Inserted on 3-. She is not having pelvic pain.

## 2024-02-13 NOTE — PROGRESS NOTES
for: \"FOLATE\"     Assessment:   No diagnosis found.            No evidence of acute suicidality, homicidality or psychosis observed.  Patient is psychiatrically stable    Plan:    1.    Continue  Viibryd 20 mg daily for depression  Lithium 150 mg BID for mood stabilization and chronic SI  Seroquel 200 mg nightly for sleep/hallucinations  Gabapentin 300 mg TID for anxiety        The risks, benefits, side effects, indications, contraindications, and adverse effects of the medications have been discussed. Yes.  2. The pt has verbalized understanding and has capacity to give informed consent.  3. The Shiv report has been reviewed according to HB1 regulations.  4. Supportive therapy offered.  5. Follow up: No follow-ups on file.  6. The patient has been advised to call with any problems.  7. Controlled substance Treatment Plan: this is a maintenance dose..  8. The above listed medications have been continued, modifications in meds and other orders/labs as follows:      No orders of the defined types were placed in this encounter.       No orders of the defined types were placed in this encounter.      9. Additional comments: continue therapy, discussed sleep hygiene, discussed the use of coping skills and relaxation strategies to manage symptoms.         10.Over 50% of the total visit time of   30  minutes was spent on counseling and/or coordination of care of:                        No diagnosis found.                          Psychotherapy Topics: mood/medication effectiveness family    Anamika Monique, APRN - CNP    This dictation was generated by voice recognition computer software.  Although all attempts are made to edit the dictation for accuracy, there may be errors in the transcription that are not intended.

## 2024-02-16 ENCOUNTER — TELEPHONE (OUTPATIENT)
Dept: OBGYN CLINIC | Age: 29
End: 2024-02-16

## 2024-02-16 DIAGNOSIS — M79.7 FIBROMYALGIA: ICD-10-CM

## 2024-02-19 RX ORDER — TRAMADOL HYDROCHLORIDE 50 MG/1
50 TABLET ORAL EVERY 6 HOURS PRN
Qty: 30 TABLET | Refills: 2 | Status: SHIPPED | OUTPATIENT
Start: 2024-02-19 | End: 2024-03-20

## 2024-02-26 ENCOUNTER — TELEPHONE (OUTPATIENT)
Dept: PSYCHIATRY | Age: 29
End: 2024-02-26

## 2024-02-26 DIAGNOSIS — F34.9 PERSISTENT MOOD (AFFECTIVE) DISORDER, UNSPECIFIED (HCC): ICD-10-CM

## 2024-02-26 DIAGNOSIS — G47.00 INSOMNIA, UNSPECIFIED TYPE: ICD-10-CM

## 2024-02-26 DIAGNOSIS — R51.9 ACUTE NONINTRACTABLE HEADACHE, UNSPECIFIED HEADACHE TYPE: ICD-10-CM

## 2024-02-26 DIAGNOSIS — R56.9 CONVULSIONS, UNSPECIFIED CONVULSION TYPE (HCC): ICD-10-CM

## 2024-02-26 RX ORDER — LITHIUM CARBONATE 150 MG/1
150 CAPSULE ORAL 2 TIMES DAILY WITH MEALS
Qty: 60 CAPSULE | Refills: 1 | Status: SHIPPED | OUTPATIENT
Start: 2024-02-26 | End: 2024-04-18 | Stop reason: SDUPTHER

## 2024-02-26 RX ORDER — QUETIAPINE FUMARATE 100 MG/1
200 TABLET, FILM COATED ORAL NIGHTLY
Qty: 60 TABLET | Refills: 1 | OUTPATIENT
Start: 2024-02-26

## 2024-02-26 NOTE — TELEPHONE ENCOUNTER
Called and let pt know that her script for lithium was sent to her pharmacy    Electronically signed by Niraj Contreras on 2/26/2024 at 4:43 PM

## 2024-02-26 NOTE — TELEPHONE ENCOUNTER
Requested Prescriptions     Pending Prescriptions Disp Refills    topiramate (TOPAMAX) 50 MG tablet [Pharmacy Med Name: TOPIRAMATE 50 MG TABS 50 Tablet] 60 tablet 5     Sig: TAKE ONE TABLET BY MOUTH EVERY MORNING AND TAKE TWO TABLETS BY MOUTH EVERY EVENING       Last Office Visit: 9/19/2023  Next Office Visit: NONE scheduled   Last Medication Refill: 10/10/2023 with 5 RF

## 2024-02-26 NOTE — TELEPHONE ENCOUNTER
Pharmacy sent a request to refill pt's medication       Last office visit : 2/13/2024 S Kayden LAM  Next office visit : 4/15/2024 S Kayden LAM    Requested Prescriptions     Pending Prescriptions Disp Refills    QUEtiapine (SEROQUEL) 100 MG tablet [Pharmacy Med Name: QUETIAPINE FUMARATE 100  Tablet] 60 tablet 1     Sig: Take 2 tablets by mouth nightly    lithium 150 MG capsule [Pharmacy Med Name: LITHIUM CARBONATE 150 MG  Capsule] 60 capsule 1     Sig: Take 1 capsule by mouth 2 times daily (with meals)            Niraj Contreras       2/13/24                                         Progress Note     Sherie Oc 1995                            Chief Complaint   Patient presents with    Medication Check            Subjective:    Patient is a 28 y.o. female diagnosed with persistent mood disorder, anxiety, PTSD, insomnia, BPD, and presents today for follow-up.  Last seen in clinic on 1/16/24  and prior records were reviewed.     Last visit: \"I've been doing really good, a little irritability but I think the lithium is doing good.\"  Seen in office today, wearing dress clothing, appropriate for season.  She is alert and oriented x 4.  She is calm, cooperative, and very pleasant.  She reports overall she feels mood has been doing pretty well since initiating lithium, does still reports some irritability but denies any negative or unwanted side effects.  Last lithium level checked on 1/8/2024, 0.3, discussed with patient level appropriate for lower dose, symptoms seem well controlled, reports she feels it has helped with chronic suicidal thoughts.  She denies any current or active suicidal thoughts, denies hallucinations, and no overt paranoia or delusions appreciated.  She reports sleep has been doing well with Seroquel, wants to continue.  PCP prescribing Xanax as needed for anxiety. We had previously tapered off this, however was restarted. She continues therapy with [facility], they are

## 2024-02-27 RX ORDER — TOPIRAMATE 50 MG/1
TABLET, FILM COATED ORAL
Qty: 60 TABLET | Refills: 5 | Status: SHIPPED | OUTPATIENT
Start: 2024-02-27

## 2024-02-28 ENCOUNTER — TELEPHONE (OUTPATIENT)
Dept: OBGYN CLINIC | Age: 29
End: 2024-02-28

## 2024-02-28 NOTE — TELEPHONE ENCOUNTER
Called pt and scheduled appt for IUD insertion for 2/29/24. Pt states she has already received cytotec and does not need a new order.

## 2024-02-28 NOTE — TELEPHONE ENCOUNTER
Patients IUD arrived in office. Called pt and unable to reach. Lvm for pt to return our call and schedule appt for insertion.

## 2024-02-29 ENCOUNTER — PROCEDURE VISIT (OUTPATIENT)
Dept: OBGYN CLINIC | Age: 29
End: 2024-02-29

## 2024-02-29 VITALS
SYSTOLIC BLOOD PRESSURE: 105 MMHG | HEART RATE: 85 BPM | WEIGHT: 96 LBS | DIASTOLIC BLOOD PRESSURE: 67 MMHG | HEIGHT: 61 IN | BODY MASS INDEX: 18.12 KG/M2

## 2024-02-29 DIAGNOSIS — N92.0 MENORRHAGIA WITH REGULAR CYCLE: ICD-10-CM

## 2024-02-29 DIAGNOSIS — Z30.433 ENCOUNTER FOR IUD REMOVAL AND REINSERTION: Primary | ICD-10-CM

## 2024-02-29 NOTE — PROGRESS NOTES
[facility]  CNM Office Note    Sherie Renae is a 28 y.o. female who presents today for her medical conditions/ complaints as noted below.  Chief Complaint   Patient presents with    Procedure     HPI  Sherie presents today for IUD removal and reinsertion. Her periods have returned with Mirena after having it 5 years and they have continuously gotten heavier. She desires replacement. She took pre-procedure cytotec.     Problems/Complaints today:  1. Encounter for IUD removal and reinsertion  2. Menorrhagia with regular cycle       Patient Active Problem List   Diagnosis    Allergic rhinitis    IBS (irritable bowel syndrome)    Gastroesophageal reflux disease without esophagitis    Panic attack    Near syncope    Tachycardia    Chest pain    Generalized abdominal cramping    Chronic epigastric pain    Chronic vomiting    Chronic nausea    Chronic diarrhea       Patient's last menstrual period was 2024 (exact date).      Past Medical History:   Diagnosis Date    Allergic rhinitis     Migraines     PTSD (post-traumatic stress disorder)     Schizoaffective disorder (HCC)      Past Surgical History:   Procedure Laterality Date    CHOLECYSTECTOMY      COLONOSCOPY  03/15/2012    Dr Rees    COLONOSCOPY  2014    Dr Rees-infectious colitis (stool samples sent), cipro/flagyl    COLONOSCOPY N/A 2022    Dr Mclean, (-) Micro Colitis,  21 year recall    COLPOSCOPY      TONSILLECTOMY      TYMPANOSTOMY TUBE PLACEMENT  age 6    UPPER GASTROINTESTINAL ENDOSCOPY  2015    Dr Rees reflux    UPPER GASTROINTESTINAL ENDOSCOPY  03/15/2012    Dr Rees, normal,urea (-)    UPPER GASTROINTESTINAL ENDOSCOPY N/A 2022    Dr Mclean, (-)Sprue     Family History   Problem Relation Age of Onset    Asthma Brother     Stroke Maternal Grandmother     High Blood Pressure Maternal Grandmother     Diabetes Maternal Grandmother     Osteoporosis Maternal Grandmother

## 2024-03-01 ASSESSMENT — ENCOUNTER SYMPTOMS
NAUSEA: 0
RESPIRATORY NEGATIVE: 1
DIARRHEA: 0
SHORTNESS OF BREATH: 0
RECTAL PAIN: 0
CONSTIPATION: 0
CHEST TIGHTNESS: 0
GASTROINTESTINAL NEGATIVE: 1
BACK PAIN: 0
ABDOMINAL PAIN: 0

## 2024-03-01 NOTE — PATIENT INSTRUCTIONS
Patient Education        Intrauterine Device (IUD) Insertion: Care Instructions  Overview     An intrauterine device (IUD) is a very effective method of birth control. It is a small, plastic, T-shaped device that uses copper or hormones to prevent pregnancy. The doctor places the IUD into your uterus. Plastic strings tied to the end of the IUD hang down through the cervix into the vagina. Your doctor may teach you how to check the placement of your IUD by feeling the strings.  You can have an IUD inserted at any time, as long as you aren't pregnant and you don't have a pelvic infection. Be sure to tell your doctor about any health problems you have or medicines you take. If you and your doctor discuss it before you give birth, the IUD can be placed right after you deliver.  There are two types of IUDs. The copper IUD works for up to 12 years. The hormonal IUD works for 3 to 8 years, depending on which brand you have. Talk to your doctor about which IUD is right for you and how long you can use it. The hormonal IUD also reduces menstrual bleeding and cramping.  Follow-up care is a key part of your treatment and safety. Be sure to make and go to all appointments, and call your doctor if you are having problems. It's also a good idea to know your test results and keep a list of the medicines you take.  How can you care for yourself at home?  It's safe to use while breastfeeding.  You may experience some mild cramping and light bleeding (spotting) for 1 or 2 days. Use a hot water bottle or a heating pad set on low on your belly for pain.  Take an over-the-counter pain medicine, such as acetaminophen (Tylenol), ibuprofen (Advil, Motrin), and naproxen (Aleve) if needed. Read and follow all instructions on the label.  Do not take two or more pain medicines at the same time unless the doctor told you to. Many pain medicines have acetaminophen, which is Tylenol. Too much acetaminophen (Tylenol) can be harmful.  If you want

## 2024-03-07 DIAGNOSIS — G43.109 MIGRAINE WITH AURA AND WITHOUT STATUS MIGRAINOSUS, NOT INTRACTABLE: ICD-10-CM

## 2024-03-07 DIAGNOSIS — F34.9 PERSISTENT MOOD (AFFECTIVE) DISORDER, UNSPECIFIED (HCC): ICD-10-CM

## 2024-03-07 DIAGNOSIS — R56.9 CONVULSIONS, UNSPECIFIED CONVULSION TYPE (HCC): ICD-10-CM

## 2024-03-07 DIAGNOSIS — K21.9 GASTROESOPHAGEAL REFLUX DISEASE WITHOUT ESOPHAGITIS: ICD-10-CM

## 2024-03-07 RX ORDER — ATOGEPANT 60 MG/1
1 TABLET ORAL DAILY
Qty: 30 TABLET | Refills: 3 | Status: SHIPPED | OUTPATIENT
Start: 2024-03-07

## 2024-03-07 RX ORDER — VILAZODONE HYDROCHLORIDE 20 MG/1
20 TABLET ORAL DAILY
Qty: 30 TABLET | Refills: 2 | Status: SHIPPED | OUTPATIENT
Start: 2024-03-07

## 2024-03-07 NOTE — TELEPHONE ENCOUNTER
Determinants of Health           Financial Resource Strain: Low Risk  (3/28/2022)     Overall Financial Resource Strain (CARDIA)      Difficulty of Paying Living Expenses: Not hard at all            MSE:  Appearance: Appropriately groomed. Made good eye contact.  Appears stated age. Gait stable.  No abnormal movements or tremor.   Behavior: Calm, cooperative, and socially appropriate. No psychomotor retardation/agitation appreciated.   Speech: Normal in tone, volume, and quality. No slurring, dysarthria or pressured speech noted.   Mood: \"I was manic last week\"   Affect: Mood congruent.   Thought Process: Appears linear, logical and goal oriented. Causality appears intact.   Thought Content: Denies active suicidal and homicidal ideations. No overt delusions or paranoia appreciated.   Perceptions: Denies auditory or visual hallucinations at present time. Not responding to internal stimuli.   Concentration: Intact.   Orientation: to person, place, date, and situation.   Language: Intact.   Fund of information: Intact.   Memory: Recent and remote appear intact.   Impulsivity: Limited.   Neurovegitative: Fair appetite and sleep.   Insight: Fair.   Judgment: Fair.     Cognition: Can spell \"world\" backwards: Yes                    Can do serial 7's: Yes           Lab Results   Component Value Date      12/15/2023     K 4.0 12/15/2023      12/15/2023     CO2 19 (L) 12/15/2023     BUN 14 12/15/2023     CREATININE 0.7 12/15/2023     GLUCOSE 90 12/15/2023     CALCIUM 9.1 12/15/2023     PROT 7.3 12/15/2023     LABALBU 4.4 12/15/2023     BILITOT 0.7 12/15/2023     ALKPHOS 76 12/15/2023     AST 52 (H) 12/15/2023     ALT 51 (H) 12/15/2023     LABGLOM >60 12/15/2023     GFRAA >59 09/26/2022     GLOB 3.0 08/10/2016            Lab Results   Component Value Date/Time      12/15/2023 10:18 AM     K 4.0 12/15/2023 10:18 AM      12/15/2023 10:18 AM     CO2 19 12/15/2023 10:18 AM     BUN 14 12/15/2023 10:18 AM

## 2024-03-07 NOTE — TELEPHONE ENCOUNTER
Requested Prescriptions     Pending Prescriptions Disp Refills    QULIPTA 60 MG TABS [Pharmacy Med Name: QULIPTA 60 MG TABS 60 Tablet] 30 tablet 3     Sig: Take 1 tablet by mouth daily       Last Office Visit: 9/19/2023  Next Office Visit: Visit date not found  Last Medication Refill: 11/6/2023 with 3 RF

## 2024-03-08 ENCOUNTER — TELEPHONE (OUTPATIENT)
Dept: PSYCHIATRY | Age: 29
End: 2024-03-08

## 2024-03-08 DIAGNOSIS — Z30.431 INTRAUTERINE DEVICE SURVEILLANCE: Primary | ICD-10-CM

## 2024-03-08 RX ORDER — PANTOPRAZOLE SODIUM 40 MG/1
40 TABLET, DELAYED RELEASE ORAL DAILY
Qty: 30 TABLET | Refills: 5 | Status: SHIPPED | OUTPATIENT
Start: 2024-03-08

## 2024-03-08 NOTE — PROGRESS NOTES
Patient called complaining  of pelvic pain since the IUD was placed. Yenifer is ok to order a transvaginal us.

## 2024-03-14 ENCOUNTER — HOSPITAL ENCOUNTER (OUTPATIENT)
Dept: ULTRASOUND IMAGING | Age: 29
Discharge: HOME | End: 2024-03-14
Payer: MEDICAID

## 2024-03-14 DIAGNOSIS — Z30.431 INTRAUTERINE DEVICE SURVEILLANCE: ICD-10-CM

## 2024-03-14 PROCEDURE — 76830 TRANSVAGINAL US NON-OB: CPT

## 2024-03-18 ENCOUNTER — TELEPHONE (OUTPATIENT)
Dept: PSYCHIATRY | Age: 29
End: 2024-03-18

## 2024-03-18 NOTE — TELEPHONE ENCOUNTER
Pt sent message below over the weekend:  Sherie Medell[facility] Staff (supporting Anamika Monique, APRN - CNP)2 days ago     ES  I was wondering if we could increase my viibryd? I’m having the head buzzing. It’s becoming very uncomfortable. It happens at night about 2 hours before I need to take my medications and then continues to happen hours after I take it making it hard to fall asleep. It started doing this for the past two weeks. I will take another viibryd and it will stop. Making me think I need to increase my dose, that I have become tolerant to the 20mg dose I’m on. Could we increase it to 40mg?           Contacted pt as requested by DEBORAH LAM after she reviewed the above-pt stated that she has not missed doses or change time when she is taking the Viibryd.  Pt informed that the APRN said increasing the dose my help temporarily , but may have same issues at the higher dose, 40 is the max dose so then would have to taper off slowly as this med can cause brain zaps coming off of it.  Also higher doses may cause more side effects.  Pt informed that the APRN would like to see her at an appt to discuss med adjust and pt now has an appt tomorrow at 4 pm.

## 2024-03-20 ENCOUNTER — TELEPHONE (OUTPATIENT)
Dept: PSYCHIATRY | Age: 29
End: 2024-03-20

## 2024-03-20 NOTE — TELEPHONE ENCOUNTER
Appointment For: Sherie Renae ([id number])   Visit Type: FOLLOW UP (1002)      3/25/2024    3:00 PM  30 mins.  [facility]      Patient Comments:      ----------------------------------   This appointment rescheduled from:   3/19/2024    4:00 PM  30 mins.  [facility]     Pt cancel/rescheduled appt through [employer].    Electronically signed by Giuliana Lakhani MA on 3/20/2024 at 11:45 AM     normal...

## 2024-03-21 DIAGNOSIS — M79.7 FIBROMYALGIA: ICD-10-CM

## 2024-03-21 RX ORDER — TRAMADOL HYDROCHLORIDE 50 MG/1
50 TABLET ORAL EVERY 6 HOURS PRN
Qty: 30 TABLET | Refills: 0 | Status: SHIPPED | OUTPATIENT
Start: 2024-03-21 | End: 2024-04-20

## 2024-03-24 ENCOUNTER — TELEPHONE (OUTPATIENT)
Dept: PSYCHIATRY | Age: 29
End: 2024-03-24

## 2024-03-25 ENCOUNTER — TELEPHONE (OUTPATIENT)
Dept: NEUROSURGERY | Age: 29
End: 2024-03-25

## 2024-03-25 ENCOUNTER — TELEPHONE (OUTPATIENT)
Dept: PSYCHIATRY | Age: 29
End: 2024-03-25

## 2024-03-25 DIAGNOSIS — G43.109 MIGRAINE WITH AURA AND WITHOUT STATUS MIGRAINOSUS, NOT INTRACTABLE: ICD-10-CM

## 2024-03-25 DIAGNOSIS — R51.9 NONINTRACTABLE HEADACHE, UNSPECIFIED CHRONICITY PATTERN, UNSPECIFIED HEADACHE TYPE: ICD-10-CM

## 2024-03-25 DIAGNOSIS — G44.009 CLUSTER HEADACHE, NOT INTRACTABLE, UNSPECIFIED CHRONICITY PATTERN: ICD-10-CM

## 2024-03-25 NOTE — TELEPHONE ENCOUNTER
Patient left  stating she wants to schedule a f/u appt w/Dr Davison, patient did not leave any other details. Patient was last seen almost 1 year ago and canceled her f/u visit w/out rescheduling at that time.

## 2024-03-25 NOTE — TELEPHONE ENCOUNTER
Pt called to reschedule her appt for 3/25 @ 3 PM due having a migraine for a week and she is going to the doctor      Appt reschedule for 3/27 @ 3:30 PM      Electronically signed by Niraj Contreras on 3/25/2024 at 12:01 PM

## 2024-03-26 ENCOUNTER — TELEPHONE (OUTPATIENT)
Dept: PSYCHIATRY | Age: 29
End: 2024-03-26

## 2024-03-26 DIAGNOSIS — F41.1 GAD (GENERALIZED ANXIETY DISORDER): ICD-10-CM

## 2024-03-26 DIAGNOSIS — M79.7 FIBROMYALGIA: ICD-10-CM

## 2024-03-26 RX ORDER — GABAPENTIN 300 MG/1
300 CAPSULE ORAL 3 TIMES DAILY
Qty: 90 CAPSULE | Refills: 0 | Status: SHIPPED | OUTPATIENT
Start: 2024-03-26 | End: 2024-04-25

## 2024-03-26 RX ORDER — TRAMADOL HYDROCHLORIDE 50 MG/1
50 TABLET ORAL EVERY 6 HOURS PRN
Qty: 30 TABLET | Refills: 2 | OUTPATIENT
Start: 2024-03-26

## 2024-03-26 NOTE — TELEPHONE ENCOUNTER
Called and lvm letting pt know that her script for lamotrigine was sent to her pharmacy     Electronically signed by Niraj Contreras on 3/26/2024 at 10:13 AM

## 2024-03-26 NOTE — TELEPHONE ENCOUNTER
manage symptoms.            10.Over 50% of the total visit time of   30  minutes was spent on counseling and/or coordination of care of:                         No diagnosis found.                               Psychotherapy Topics: mood/medication effectiveness family     GENARO Orellana - CNP

## 2024-03-26 NOTE — TELEPHONE ENCOUNTER
Requested Prescriptions     Pending Prescriptions Disp Refills    rimegepant sulfate (NURTEC) 75 MG TBDP 8 tablet 3     Sig: Take 1 tablet at the onset of migraine. Do not exceed 1 tablet in 24 hours.    SUMAtriptan (IMITREX) 6 MG/0.5ML SOLN injection 0.5 mL 5     Sig: Inject 0.5 mLs into the skin once as needed for Migraine       Last Office Visit:  9/19/2023  Next Office Visit:  3-  Last Medication Refill:    Shiv up to date:  n/a    *RX updated to reflect   3-  fill date*

## 2024-03-27 ENCOUNTER — TELEPHONE (OUTPATIENT)
Dept: PSYCHIATRY | Age: 29
End: 2024-03-27

## 2024-03-27 RX ORDER — SUMATRIPTAN 6 MG/.5ML
6 INJECTION, SOLUTION SUBCUTANEOUS
Qty: 0.5 ML | Refills: 5 | Status: SHIPPED | OUTPATIENT
Start: 2024-03-27 | End: 2024-03-27

## 2024-03-27 RX ORDER — RIMEGEPANT SULFATE 75 MG/75MG
TABLET, ORALLY DISINTEGRATING ORAL
Qty: 8 TABLET | Refills: 3 | Status: SHIPPED | OUTPATIENT
Start: 2024-03-27

## 2024-03-27 NOTE — TELEPHONE ENCOUNTER
Called pt to cancel/reschedule appt for today 03/27/24 with Anamika Monique because the provider had a family emergency and had to leave.    Cancelled appt  No answer and LVM.  Sent [employer]t message as well.    Electronically signed by Giuliana Lakhani MA on 3/27/2024 at 11:19 AM

## 2024-03-29 RX ORDER — BUTALBITAL, ASPIRIN, AND CAFFEINE 325; 50; 40 MG/1; MG/1; MG/1
1 CAPSULE ORAL EVERY 4 HOURS PRN
Qty: 15 CAPSULE | Refills: 1 | Status: SHIPPED | OUTPATIENT
Start: 2024-03-29 | End: 2024-04-28

## 2024-04-02 ENCOUNTER — OFFICE VISIT (OUTPATIENT)
Dept: NEUROSURGERY | Age: 29
End: 2024-04-02
Payer: MEDICAID

## 2024-04-02 VITALS
HEIGHT: 61 IN | SYSTOLIC BLOOD PRESSURE: 118 MMHG | RESPIRATION RATE: 18 BRPM | HEART RATE: 77 BPM | BODY MASS INDEX: 18.12 KG/M2 | WEIGHT: 96 LBS | DIASTOLIC BLOOD PRESSURE: 78 MMHG

## 2024-04-02 DIAGNOSIS — Z86.69 HX OF MIGRAINES: ICD-10-CM

## 2024-04-02 DIAGNOSIS — G93.5 CHIARI MALFORMATION TYPE I (HCC): Primary | ICD-10-CM

## 2024-04-02 PROCEDURE — 99213 OFFICE O/P EST LOW 20 MIN: CPT | Performed by: NEUROLOGICAL SURGERY

## 2024-04-02 ASSESSMENT — ENCOUNTER SYMPTOMS
GASTROINTESTINAL NEGATIVE: 1
EYES NEGATIVE: 1
RESPIRATORY NEGATIVE: 1

## 2024-04-02 NOTE — PROGRESS NOTES
Review of Systems   Constitutional: Negative.    HENT: Negative.     Eyes: Negative.    Respiratory: Negative.     Cardiovascular: Negative.    Gastrointestinal: Negative.    Genitourinary: Negative.    Musculoskeletal:  Positive for myalgias and neck pain.   Skin: Negative.    Endo/Heme/Allergies: Negative.    Psychiatric/Behavioral: Negative.        
Review of Systems   Constitutional: Negative.    HENT: Negative.     Eyes: Negative.    Respiratory: Negative.     Cardiovascular: Negative.    Gastrointestinal: Negative.    Genitourinary: Negative.    Musculoskeletal:  Positive for myalgias and neck pain.   Skin: Negative.    Neurological:  Positive for focal weakness, weakness and headaches.   Endo/Heme/Allergies: Negative.    Psychiatric/Behavioral: Negative.        
21 tablet 2    colestipol (COLESTID) 1 g tablet Take 1 tablet by mouth 2 times daily 60 tablet 3    ondansetron (ZOFRAN) 4 MG tablet Take 1 tablet by mouth every 8 hours as needed for Nausea or Vomiting 30 tablet 2    naloxone 4 MG/0.1ML LIQD nasal spray 1 spray by Nasal route as needed for Opioid Reversal 1 each 5    loperamide (IMODIUM) 2 MG capsule Take 1 capsule by mouth as needed for Diarrhea      butalbital-APAP-caffeine -40 MG CAPS per capsule TAKE ONE CAPSULE BY MOUTH EVERY 4 HOURS AS NEEDED FOR HEADACHE 15 capsule 1    levonorgestrel (MIRENA, 52 MG,) IUD 52 mg by IntraUTERine route      dicyclomine (BENTYL) 20 MG tablet Take 1 tablet by mouth in the morning and 1 tablet at noon and 1 tablet in the evening and 1 tablet before bedtime. (Patient taking differently: Take 1 tablet by mouth as needed) 120 tablet 5    miSOPROStol (CYTOTEC) 100 MCG tablet Take 2 tablets by mouth the night prior to and morning of procedure (Patient not taking: Reported on 4/2/2024) 4 tablet 0     No current facility-administered medications for this visit.       Allergies:  Latex, Penicillins, Amitriptyline, Dicyclomine, Paxil [paroxetine hcl], Pristiq [desvenlafaxine], Desyrel [trazodone], and Zoloft [sertraline hcl]    Social History:   Social History     Tobacco Use   Smoking Status Never   Smokeless Tobacco Never     Social History     Substance and Sexual Activity   Alcohol Use Not Currently    Comment: maybe 2-3x a yr         Family History:   Family History   Problem Relation Age of Onset    Asthma Brother     Stroke Maternal Grandmother     High Blood Pressure Maternal Grandmother     Diabetes Maternal Grandmother     Osteoporosis Maternal Grandmother     Cancer Maternal Grandmother         female    High Cholesterol Maternal Grandfather     Colon Polyps Father     Colon Cancer Neg Hx     Liver Cancer Neg Hx        REVIEW OF SYSTEMS:  Constitutional: Negative.    HENT: Negative.     Eyes: Negative.    Respiratory:

## 2024-04-09 ENCOUNTER — OFFICE VISIT (OUTPATIENT)
Dept: FAMILY MEDICINE CLINIC | Age: 29
End: 2024-04-09
Payer: MEDICAID

## 2024-04-09 VITALS
WEIGHT: 96.5 LBS | HEIGHT: 61 IN | BODY MASS INDEX: 18.22 KG/M2 | HEART RATE: 107 BPM | OXYGEN SATURATION: 100 % | DIASTOLIC BLOOD PRESSURE: 62 MMHG | TEMPERATURE: 97.3 F | SYSTOLIC BLOOD PRESSURE: 120 MMHG

## 2024-04-09 DIAGNOSIS — M79.7 FIBROMYALGIA: ICD-10-CM

## 2024-04-09 DIAGNOSIS — R53.83 OTHER FATIGUE: ICD-10-CM

## 2024-04-09 DIAGNOSIS — R53.83 OTHER FATIGUE: Primary | ICD-10-CM

## 2024-04-09 LAB
ALBUMIN SERPL-MCNC: 4.6 G/DL (ref 3.5–5.2)
ALP SERPL-CCNC: 57 U/L (ref 35–104)
ALT SERPL-CCNC: 9 U/L (ref 5–33)
ANION GAP SERPL CALCULATED.3IONS-SCNC: 12 MMOL/L (ref 7–19)
AST SERPL-CCNC: 14 U/L (ref 5–32)
BILIRUB SERPL-MCNC: 0.5 MG/DL (ref 0.2–1.2)
BUN SERPL-MCNC: 10 MG/DL (ref 6–20)
CALCIUM SERPL-MCNC: 9.1 MG/DL (ref 8.6–10)
CHLORIDE SERPL-SCNC: 107 MMOL/L (ref 98–111)
CO2 SERPL-SCNC: 22 MMOL/L (ref 22–29)
CREAT SERPL-MCNC: 0.7 MG/DL (ref 0.5–0.9)
CRP SERPL HS-MCNC: <0.3 MG/DL (ref 0–0.5)
ERYTHROCYTE [DISTWIDTH] IN BLOOD BY AUTOMATED COUNT: 12.5 % (ref 11.5–14.5)
ERYTHROCYTE [SEDIMENTATION RATE] IN BLOOD BY WESTERGREN METHOD: 4 MM/HR (ref 0–20)
GLUCOSE SERPL-MCNC: 90 MG/DL (ref 74–109)
HCT VFR BLD AUTO: 40.6 % (ref 37–47)
HGB BLD-MCNC: 13 G/DL (ref 12–16)
MCH RBC QN AUTO: 31.9 PG (ref 27–31)
MCHC RBC AUTO-ENTMCNC: 32 G/DL (ref 33–37)
MCV RBC AUTO: 99.5 FL (ref 81–99)
PLATELET # BLD AUTO: 266 K/UL (ref 130–400)
PMV BLD AUTO: 10.2 FL (ref 9.4–12.3)
POTASSIUM SERPL-SCNC: 3.4 MMOL/L (ref 3.5–5)
PROT SERPL-MCNC: 7.1 G/DL (ref 6.6–8.7)
RBC # BLD AUTO: 4.08 M/UL (ref 4.2–5.4)
RHEUMATOID FACT SER NEPH-ACNC: <10 IU/ML
SODIUM SERPL-SCNC: 141 MMOL/L (ref 136–145)
TSH SERPL DL<=0.005 MIU/L-ACNC: 3.16 UIU/ML (ref 0.27–4.2)
WBC # BLD AUTO: 7 K/UL (ref 4.8–10.8)

## 2024-04-09 PROCEDURE — 99213 OFFICE O/P EST LOW 20 MIN: CPT | Performed by: FAMILY MEDICINE

## 2024-04-09 RX ORDER — TRAMADOL HYDROCHLORIDE 50 MG/1
50 TABLET ORAL EVERY 6 HOURS PRN
Qty: 30 TABLET | Refills: 2 | Status: SHIPPED | OUTPATIENT
Start: 2024-04-09 | End: 2024-05-09

## 2024-04-09 SDOH — ECONOMIC STABILITY: FOOD INSECURITY: WITHIN THE PAST 12 MONTHS, THE FOOD YOU BOUGHT JUST DIDN'T LAST AND YOU DIDN'T HAVE MONEY TO GET MORE.: NEVER TRUE

## 2024-04-09 SDOH — ECONOMIC STABILITY: FOOD INSECURITY: WITHIN THE PAST 12 MONTHS, YOU WORRIED THAT YOUR FOOD WOULD RUN OUT BEFORE YOU GOT MONEY TO BUY MORE.: NEVER TRUE

## 2024-04-09 SDOH — ECONOMIC STABILITY: HOUSING INSECURITY
IN THE LAST 12 MONTHS, WAS THERE A TIME WHEN YOU DID NOT HAVE A STEADY PLACE TO SLEEP OR SLEPT IN A SHELTER (INCLUDING NOW)?: NO

## 2024-04-09 SDOH — ECONOMIC STABILITY: INCOME INSECURITY: HOW HARD IS IT FOR YOU TO PAY FOR THE VERY BASICS LIKE FOOD, HOUSING, MEDICAL CARE, AND HEATING?: NOT HARD AT ALL

## 2024-04-09 NOTE — PROGRESS NOTES
SUBJECTIVE:    Patient ID: Sherie Renae is a 28 y.o.female.    HPI:   Patient here for evaluation of chronic pain and fatigue.  Patient is a 28-year-old female.  She have chronic diffuse pain.  She also complains of severe fatigue.  She also have history of mental illness.  She sees psychiatry.  She is requesting Valium.  She states that psychiatry will not prescribe it to her.  They want family doctors to prescribe it.  I asked her to have psychiatry stain that they need me to prescribe benzodiazepines for her anxiety.  Also she had diffuse generalized pain.  She said that she never been tested for fibromyalgia.  She has never seen pain management.         Past Medical History:   Diagnosis Date    Allergic rhinitis     Migraines     PTSD (post-traumatic stress disorder)     Schizoaffective disorder (HCC)       Current Outpatient Medications   Medication Sig Dispense Refill    traMADol (ULTRAM) 50 MG tablet Take 1 tablet by mouth every 6 hours as needed for Pain for up to 30 days. 30 tablet 2    butalbital-aspirin-caffeine (FIORINAL) -40 MG capsule Take 1 capsule by mouth every 4 hours as needed for Headaches for up to 30 days. Max Daily Amount: 6 capsules 15 capsule 1    rimegepant sulfate (NURTEC) 75 MG TBDP Take 1 tablet at the onset of migraine. Do not exceed 1 tablet in 24 hours. 8 tablet 3    gabapentin (NEURONTIN) 300 MG capsule Take 1 capsule by mouth 3 times daily for 30 days. Intended supply: 30 days Max Daily Amount: 900 mg 90 capsule 0    pantoprazole (PROTONIX) 40 MG tablet Take 1 tablet by mouth daily 30 tablet 5    QULIPTA 60 MG TABS Take 1 tablet by mouth daily 30 tablet 3    VIIBRYD 20 MG Tablet Take 1 tablet by mouth daily 30 tablet 2    topiramate (TOPAMAX) 50 MG tablet TAKE ONE TABLET BY MOUTH EVERY MORNING AND TAKE TWO TABLETS BY MOUTH EVERY EVENING 60 tablet 5    lithium 150 MG capsule Take 1 capsule by mouth 2 times daily (with meals) 60 capsule 1    miSOPROStol (CYTOTEC) 100 MCG tablet

## 2024-04-11 LAB — NUCLEAR IGG SER QL IA: NORMAL

## 2024-04-12 ENCOUNTER — TELEPHONE (OUTPATIENT)
Dept: PSYCHIATRY | Age: 29
End: 2024-04-12

## 2024-04-12 DIAGNOSIS — K21.9 GASTROESOPHAGEAL REFLUX DISEASE WITHOUT ESOPHAGITIS: ICD-10-CM

## 2024-04-12 RX ORDER — PANTOPRAZOLE SODIUM 40 MG/1
40 TABLET, DELAYED RELEASE ORAL DAILY
Qty: 30 TABLET | Refills: 5 | Status: SHIPPED | OUTPATIENT
Start: 2024-04-12

## 2024-04-12 NOTE — TELEPHONE ENCOUNTER
Called and confirmed appt with pt for 4/15 @ 2:30 PM    Electronically signed by Niraj Contreras on 4/12/2024 at 12:03 PM

## 2024-04-15 ENCOUNTER — TELEPHONE (OUTPATIENT)
Dept: PSYCHIATRY | Age: 29
End: 2024-04-15

## 2024-04-15 NOTE — TELEPHONE ENCOUNTER
Pt called to reschedule her appt for 4/15 @ 2:30 PM due to a migraine     Appt reschedule for 4/18 @ 2 PM      Electronically signed by Niraj Contreras on 4/15/2024 at 1:26 PM

## 2024-04-16 LAB
1OH-MIDAZOLAM UR CFM-MCNC: <20 NG/ML
7AMINOCLONAZEPAM UR CFM-MCNC: <5 NG/ML
A-OH ALPRAZ UR CFM-MCNC: 224 NG/ML
ALPRAZ UR CFM-MCNC: 83 NG/ML
AMPHET CTO UR CFM-MCNC: NEGATIVE NG/ML
BARBITURATES CTO UR CFM-MCNC: NEGATIVE NG/ML
BENZODIAZ CTO UR CFM-MCNC: NORMAL NG/ML
BUPRENORPHINE UR QL SCN: NEGATIVE NG/ML
CANNABINOIDS CTO UR CFM-MCNC: NEGATIVE NG/ML
CARISOPRODOL UR QL: NEGATIVE NG/ML
CHLORDIAZEP UR CFM-MCNC: <20 NG/ML
CLONAZEPAM UR CFM-MCNC: <5 NG/ML
COCAINE CTO UR CFM-MCNC: NEGATIVE NG/ML
CREAT UR-MCNC: 237 MG/DL (ref 20–400)
DIAZEPAM UR CFM-MCNC: <20 NG/ML
DRUG SCREEN COMMENT UR-IMP: NORMAL
ETHYL GLUCURONIDE UR QL SCN: NEGATIVE NG/ML
FENTANYL UR QL: NEGATIVE NG/ML
LORAZEPAM UR CFM-MCNC: <20 NG/ML
MEPERIDINE UR QL: NEGATIVE NG/ML
METHADONE CTO UR CFM-MCNC: NEGATIVE NG/ML
MIDAZOLAM UR CFM-MCNC: <20 NG/ML
NORDIAZEPAM UR CFM-MCNC: 230 NG/ML
NORTRAMADOL UR-MCNC: NORMAL NG/ML
OPIATES UR QL SCN: NEGATIVE NG/ML
OXAZEPAM UR CFM-MCNC: 1432 NG/ML
OXYCODONE SCREEN URINE: NEGATIVE NG/ML
PCP CTO UR CFM-MCNC: NEGATIVE NG/ML
PROPOXYPH CTO UR CFM-MCNC: NEGATIVE NG/ML
TAPENTADOL UR QL SCN: NORMAL NG/ML
TAPENTADOL UR-MCNC: <50 NG/ML
TEMAZEPAM UR CFM-MCNC: 1295 NG/ML
TRAMADOL SCREEN URINE: NORMAL NG/ML
TRAMADOL UR-MCNC: NORMAL NG/ML
ZOLPIDEM UR QL SCN: NEGATIVE NG/ML

## 2024-04-18 ENCOUNTER — OFFICE VISIT (OUTPATIENT)
Dept: PSYCHIATRY | Age: 29
End: 2024-04-18
Payer: MEDICAID

## 2024-04-18 VITALS
OXYGEN SATURATION: 100 % | WEIGHT: 99.1 LBS | RESPIRATION RATE: 20 BRPM | HEIGHT: 61 IN | DIASTOLIC BLOOD PRESSURE: 77 MMHG | BODY MASS INDEX: 18.71 KG/M2 | TEMPERATURE: 98.5 F | SYSTOLIC BLOOD PRESSURE: 108 MMHG | HEART RATE: 120 BPM

## 2024-04-18 DIAGNOSIS — Z79.899 LITHIUM USE: ICD-10-CM

## 2024-04-18 DIAGNOSIS — F60.3 BORDERLINE PERSONALITY DISORDER (HCC): ICD-10-CM

## 2024-04-18 DIAGNOSIS — F34.9 PERSISTENT MOOD (AFFECTIVE) DISORDER, UNSPECIFIED (HCC): Primary | ICD-10-CM

## 2024-04-18 DIAGNOSIS — R53.83 OTHER FATIGUE: ICD-10-CM

## 2024-04-18 DIAGNOSIS — M79.7 FIBROMYALGIA: Primary | ICD-10-CM

## 2024-04-18 DIAGNOSIS — G47.00 INSOMNIA, UNSPECIFIED TYPE: ICD-10-CM

## 2024-04-18 DIAGNOSIS — F41.1 GAD (GENERALIZED ANXIETY DISORDER): ICD-10-CM

## 2024-04-18 DIAGNOSIS — F43.12 CHRONIC POST-TRAUMATIC STRESS DISORDER (PTSD): ICD-10-CM

## 2024-04-18 PROCEDURE — 99214 OFFICE O/P EST MOD 30 MIN: CPT

## 2024-04-18 RX ORDER — VILAZODONE HYDROCHLORIDE 20 MG/1
20 TABLET ORAL DAILY
Qty: 30 TABLET | Refills: 2 | Status: SHIPPED | OUTPATIENT
Start: 2024-04-18

## 2024-04-18 RX ORDER — QUETIAPINE FUMARATE 300 MG/1
300 TABLET, FILM COATED ORAL NIGHTLY
Qty: 30 TABLET | Refills: 2 | Status: SHIPPED | OUTPATIENT
Start: 2024-04-18

## 2024-04-18 RX ORDER — LITHIUM CARBONATE 150 MG/1
150 CAPSULE ORAL 2 TIMES DAILY WITH MEALS
Qty: 60 CAPSULE | Refills: 2 | Status: SHIPPED | OUTPATIENT
Start: 2024-04-18

## 2024-04-18 NOTE — PROGRESS NOTES
We faxed had faxed a referral to  in [place] for fibromyalgia. His office faxed us back saying he is not taking new patient with fibromyalgia.  said to fax the referral to Dr.Christopher Rocha at [facility].

## 2024-04-18 NOTE — PROGRESS NOTES
skin once as needed for Migraine 3/27/24 4/2/24  Kristy Alvarado APRN - CNP   gabapentin (NEURONTIN) 300 MG capsule Take 1 capsule by mouth 3 times daily for 30 days. Intended supply: 30 days Max Daily Amount: 900 mg 3/26/24 4/25/24  Kayden AnamikaGENARO Caal CNP   QULIPTA 60 MG TABS Take 1 tablet by mouth daily 3/7/24   Kristy Alvarado APRN - CNP   topiramate (TOPAMAX) 50 MG tablet TAKE ONE TABLET BY MOUTH EVERY MORNING AND TAKE TWO TABLETS BY MOUTH EVERY EVENING 2/27/24   Kristy Alvarado APRN - CNP   miSOPROStol (CYTOTEC) 100 MCG tablet Take 2 tablets by mouth the night prior to and morning of procedure 2/13/24   Yenifer Goldsmith APRN - CNP   ondansetron (ZOFRAN-ODT) 4 MG disintegrating tablet Take 1 tablet by mouth 3 times daily as needed for Nausea or Vomiting 8/17/23   Rolando Lozoya APRN   colestipol (COLESTID) 1 g tablet Take 1 tablet by mouth 2 times daily 7/27/23   Rolando Lozoya APRN   ondansetron (ZOFRAN) 4 MG tablet Take 1 tablet by mouth every 8 hours as needed for Nausea or Vomiting 7/27/23   Rolando Lozoya APRN   naloxone 4 MG/0.1ML LIQD nasal spray 1 spray by Nasal route as needed for Opioid Reversal 7/12/23   Valente Le MD   loperamide (IMODIUM) 2 MG capsule Take 1 capsule by mouth as needed for Diarrhea 10/20/20   Provider, MD Leti   butalbital-APAP-caffeine -40 MG CAPS per capsule TAKE ONE CAPSULE BY MOUTH EVERY 4 HOURS AS NEEDED FOR HEADACHE 4/24/23   Valente Le MD   levonorgestrel (MIRENA, 52 MG,) IUD 52 mg by IntraUTERine route    ProviderLeti MD   dicyclomine (BENTYL) 20 MG tablet Take 1 tablet by mouth in the morning and 1 tablet at noon and 1 tablet in the evening and 1 tablet before bedtime.  Patient taking differently: Take 1 tablet by mouth as needed 8/26/22   Valente Le MD     Social History     Socioeconomic History    Marital status: Single   Tobacco Use    Smoking status: Never    Smokeless tobacco: Never   Vaping Use

## 2024-04-19 DIAGNOSIS — G43.109 MIGRAINE WITH AURA AND WITHOUT STATUS MIGRAINOSUS, NOT INTRACTABLE: ICD-10-CM

## 2024-04-19 DIAGNOSIS — R56.9 CONVULSIONS, UNSPECIFIED CONVULSION TYPE (HCC): ICD-10-CM

## 2024-04-22 RX ORDER — ATOGEPANT 60 MG/1
1 TABLET ORAL DAILY
Qty: 30 TABLET | Refills: 3 | OUTPATIENT
Start: 2024-04-22

## 2024-04-25 ENCOUNTER — TELEPHONE (OUTPATIENT)
Dept: NEUROLOGY | Age: 29
End: 2024-04-25

## 2024-04-25 NOTE — TELEPHONE ENCOUNTER
Called patient back and r/s her missed appointment to next available. Patient voiced understanding of new day and time.

## 2024-04-26 ENCOUNTER — TELEPHONE (OUTPATIENT)
Dept: NEUROLOGY | Age: 29
End: 2024-04-26

## 2024-04-26 NOTE — TELEPHONE ENCOUNTER
FAWAD ROMANO (Key: [id number])  PA Case ID #: [id number]  Need Help? Call us at [phone]  Status  Sent to Plan today  Drug  Nurtec 75MG dispersible tablets    Form  [employer] [place] Medicaid ePA Form 2017 [place]CP[place]

## 2024-04-26 NOTE — TELEPHONE ENCOUNTER
FAWAD ROMANO (Key: [id number])  PA Case ID #: [id number]  Need Help? Call us at [phone]  Outcome  Approved today  The request has been approved. The authorization is effective from 04/26/2024 to 04/26/2025, as long as the member is enrolled in their current health plan. A written notification letter will follow with additional details.  Authorization Expiration Date: 4/25/2025  Drug  Nurtec 75MG dispersible tablets    Form  [employer] [place] Medicaid ePA Form 2017 NCPDP

## 2024-04-29 ENCOUNTER — TELEPHONE (OUTPATIENT)
Dept: NEUROLOGY | Age: 29
End: 2024-04-29

## 2024-04-29 NOTE — TELEPHONE ENCOUNTER
FAWAD ROMANO (Key: [id number])  PA Case ID #: [id number]  Need Help? Call us at [phone]  Status  Sent to Plan today  Drug  Qulipta 60MG tablets    Form  [employer] [place] Medicaid ePA Form 2017 NCPDP

## 2024-05-01 NOTE — TELEPHONE ENCOUNTER
APPEAL APPROVED INFO SCANNED INTO MEDIA             APPEAL WAS SENT 05/01/2024   APPEALS CAN TAKE UP TO 90 DAYS FOR THE INSURANCE TO COMPLETE          FAWAD ROMANO (Key: [id number])  PA Case ID #: [id number]  Need Help? Call us at [phone]  Outcome  Denied on April 29  This request has not been approved. Based on the information sent for review, the requested drug did not meet our guideline rules. To get the request approved, your doctor needs to show that you have met the guideline rules below. If you have questions, please call your doctor. In some cases, the requested drug or alternatives offered may have other guideline rules that need to be met. Our guideline named ANTI-MIGRAINE CGRP INHIBITORS - QULIPTA requires the following rule(s) be met for renewal: 1. The member is not experiencing any treatment-restricting adverse effects [side effects that limit treatment]Your provider told us you have a diagnosis of migraines (a type of headache) and have experienced treatment-restricting adverse effects. This is why your request is denied. Please work with your doctor to use a different medication or get us more information if it will allow us to approve this request. A written notification letter will follow with additional details.  Drug  Qulipta 60MG tablets    Form  [employer] [place] Medicaid ePA Form 2017 NCPDP

## 2024-05-09 DIAGNOSIS — F41.1 GAD (GENERALIZED ANXIETY DISORDER): ICD-10-CM

## 2024-05-09 DIAGNOSIS — M79.7 FIBROMYALGIA: Primary | ICD-10-CM

## 2024-05-09 NOTE — TELEPHONE ENCOUNTER
disorder)    3. Chronic post-traumatic stress disorder (PTSD)    4. Borderline personality disorder (HCC)    5. Insomnia, unspecified type    6. Lithium use    Rule out conversion disorder                 No evidence of acute suicidality, homicidality or psychosis observed.  Patient is psychiatrically stable     Plan:     1.    Continue  Viibryd 20 mg daily for depression  Lithium 150 mg BID for mood stabilization and chronic SI  Seroquel, increase to 300 mg nightly for sleep/mood  Gabapentin 300 mg TID for anxiety  Psychotherapy with [facility]        The risks, benefits, side effects, indications, contraindications, and adverse effects of the medications have been discussed. Yes.  2. The pt has verbalized understanding and has capacity to give informed consent.  3. The Shiv report has been reviewed according to HB1 regulations.  4. Supportive therapy offered.  5. Follow up:    Return in about 4 weeks (around 5/16/2024) for Medication Follow up.  6. The patient has been advised to call with any problems.  7. Controlled substance Treatment Plan: this is a maintenance dose..  8. The above listed medications have been continued, modifications in meds and other orders/labs as follows:                 Encounter Medications        Orders Placed This Encounter   Medications    QUEtiapine (SEROQUEL) 300 MG tablet       Sig: Take 1 tablet by mouth nightly       Dispense:  30 tablet       Refill:  2    VIIBRYD 20 MG Tablet       Sig: Take 1 tablet by mouth daily       Dispense:  30 tablet       Refill:  2    lithium 150 MG capsule       Sig: Take 1 capsule by mouth 2 times daily (with meals)       Dispense:  60 capsule       Refill:  2                             Orders Placed This Encounter   Procedures    Lithium Level       Standing Status:   Future       Standing Expiration Date:   4/18/2025       Order Specific Question:   Time of Last Dose?       Answer:   previous night         9. Additional comments:

## 2024-05-10 ENCOUNTER — TELEPHONE (OUTPATIENT)
Dept: PSYCHIATRY | Age: 29
End: 2024-05-10

## 2024-05-10 RX ORDER — GABAPENTIN 300 MG/1
300 CAPSULE ORAL 3 TIMES DAILY
Qty: 90 CAPSULE | Refills: 0 | Status: SHIPPED | OUTPATIENT
Start: 2024-05-10 | End: 2024-06-09

## 2024-05-10 NOTE — TELEPHONE ENCOUNTER
Called and lvm letting pt know that her script for gabapentin was sent to her pharmacy     Electronically signed by Niraj Contreras on 5/10/2024 at 11:00 AM

## 2024-05-10 NOTE — TELEPHONE ENCOUNTER
Called pt on 05/10/24 for appointment reminder for 05/13/24.     -left voicemail, requesting a return call     Reminded patient to complete their visit pre-check/digital registration in [facility] [employer].

## 2024-05-13 ENCOUNTER — OFFICE VISIT (OUTPATIENT)
Dept: PSYCHIATRY | Age: 29
End: 2024-05-13
Payer: MEDICAID

## 2024-05-13 VITALS
OXYGEN SATURATION: 100 % | HEIGHT: 61 IN | DIASTOLIC BLOOD PRESSURE: 78 MMHG | TEMPERATURE: 98.1 F | WEIGHT: 100.7 LBS | HEART RATE: 110 BPM | RESPIRATION RATE: 20 BRPM | SYSTOLIC BLOOD PRESSURE: 112 MMHG | BODY MASS INDEX: 19.01 KG/M2

## 2024-05-13 DIAGNOSIS — F41.1 GAD (GENERALIZED ANXIETY DISORDER): ICD-10-CM

## 2024-05-13 DIAGNOSIS — F43.12 CHRONIC POST-TRAUMATIC STRESS DISORDER (PTSD): ICD-10-CM

## 2024-05-13 DIAGNOSIS — G47.00 INSOMNIA, UNSPECIFIED TYPE: ICD-10-CM

## 2024-05-13 DIAGNOSIS — F34.9 PERSISTENT MOOD (AFFECTIVE) DISORDER, UNSPECIFIED (HCC): Primary | ICD-10-CM

## 2024-05-13 DIAGNOSIS — F60.3 BORDERLINE PERSONALITY DISORDER (HCC): ICD-10-CM

## 2024-05-13 PROCEDURE — 99214 OFFICE O/P EST MOD 30 MIN: CPT

## 2024-05-13 RX ORDER — HYDROXYZINE HYDROCHLORIDE 25 MG/1
25 TABLET, FILM COATED ORAL 3 TIMES DAILY PRN
Qty: 90 TABLET | Refills: 0 | Status: SHIPPED | OUTPATIENT
Start: 2024-05-13

## 2024-05-13 ASSESSMENT — PATIENT HEALTH QUESTIONNAIRE - PHQ9
SUM OF ALL RESPONSES TO PHQ QUESTIONS 1-9: 3
7. TROUBLE CONCENTRATING ON THINGS, SUCH AS READING THE NEWSPAPER OR WATCHING TELEVISION: NOT AT ALL
1. LITTLE INTEREST OR PLEASURE IN DOING THINGS: SEVERAL DAYS
SUM OF ALL RESPONSES TO PHQ9 QUESTIONS 1 & 2: 1
SUM OF ALL RESPONSES TO PHQ QUESTIONS 1-9: 3
3. TROUBLE FALLING OR STAYING ASLEEP: SEVERAL DAYS
2. FEELING DOWN, DEPRESSED OR HOPELESS: NOT AT ALL
5. POOR APPETITE OR OVEREATING: NOT AT ALL
9. THOUGHTS THAT YOU WOULD BE BETTER OFF DEAD, OR OF HURTING YOURSELF: NOT AT ALL
SUM OF ALL RESPONSES TO PHQ QUESTIONS 1-9: 3
4. FEELING TIRED OR HAVING LITTLE ENERGY: SEVERAL DAYS
6. FEELING BAD ABOUT YOURSELF - OR THAT YOU ARE A FAILURE OR HAVE LET YOURSELF OR YOUR FAMILY DOWN: NOT AT ALL
10. IF YOU CHECKED OFF ANY PROBLEMS, HOW DIFFICULT HAVE THESE PROBLEMS MADE IT FOR YOU TO DO YOUR WORK, TAKE CARE OF THINGS AT HOME, OR GET ALONG WITH OTHER PEOPLE: NOT DIFFICULT AT ALL
SUM OF ALL RESPONSES TO PHQ QUESTIONS 1-9: 3
8. MOVING OR SPEAKING SO SLOWLY THAT OTHER PEOPLE COULD HAVE NOTICED. OR THE OPPOSITE, BEING SO FIGETY OR RESTLESS THAT YOU HAVE BEEN MOVING AROUND A LOT MORE THAN USUAL: NOT AT ALL

## 2024-05-13 NOTE — PROGRESS NOTES
minutes was spent on counseling and/or coordination of care of:                        1. Persistent mood (affective) disorder, unspecified (HCC)    2. RIVER (generalized anxiety disorder)    3. Chronic post-traumatic stress disorder (PTSD)    4. Borderline personality disorder (HCC)    5. Insomnia, unspecified type                                Psychotherapy Topics: mood/medication effectiveness family    Anamika Monique, APRN - CNP    This dictation was generated by voice recognition computer software.  Although all attempts are made to edit the dictation for accuracy, there may be errors in the transcription that are not intended.

## 2024-05-30 ENCOUNTER — OFFICE VISIT (OUTPATIENT)
Dept: FAMILY MEDICINE CLINIC | Age: 29
End: 2024-05-30
Payer: MEDICAID

## 2024-05-30 ENCOUNTER — OFFICE VISIT (OUTPATIENT)
Dept: NEUROLOGY | Age: 29
End: 2024-05-30
Payer: MEDICAID

## 2024-05-30 VITALS
OXYGEN SATURATION: 98 % | HEART RATE: 102 BPM | SYSTOLIC BLOOD PRESSURE: 104 MMHG | WEIGHT: 102 LBS | BODY MASS INDEX: 19.26 KG/M2 | HEIGHT: 61 IN | DIASTOLIC BLOOD PRESSURE: 71 MMHG

## 2024-05-30 VITALS
TEMPERATURE: 97.9 F | WEIGHT: 102 LBS | SYSTOLIC BLOOD PRESSURE: 118 MMHG | BODY MASS INDEX: 19.26 KG/M2 | DIASTOLIC BLOOD PRESSURE: 70 MMHG | HEART RATE: 91 BPM | HEIGHT: 61 IN | OXYGEN SATURATION: 100 %

## 2024-05-30 DIAGNOSIS — G93.5 CHIARI I MALFORMATION (HCC): ICD-10-CM

## 2024-05-30 DIAGNOSIS — J01.90 ACUTE BACTERIAL SINUSITIS: Primary | ICD-10-CM

## 2024-05-30 DIAGNOSIS — G44.009 CLUSTER HEADACHE, NOT INTRACTABLE, UNSPECIFIED CHRONICITY PATTERN: ICD-10-CM

## 2024-05-30 DIAGNOSIS — B96.89 ACUTE BACTERIAL SINUSITIS: Primary | ICD-10-CM

## 2024-05-30 DIAGNOSIS — Z79.899 MEDICATION MANAGEMENT: ICD-10-CM

## 2024-05-30 DIAGNOSIS — R56.9 CONVULSIONS, UNSPECIFIED CONVULSION TYPE (HCC): Primary | ICD-10-CM

## 2024-05-30 DIAGNOSIS — K21.9 GASTROESOPHAGEAL REFLUX DISEASE WITHOUT ESOPHAGITIS: ICD-10-CM

## 2024-05-30 DIAGNOSIS — R55 SYNCOPE, UNSPECIFIED SYNCOPE TYPE: ICD-10-CM

## 2024-05-30 DIAGNOSIS — G43.109 MIGRAINE WITH AURA AND WITHOUT STATUS MIGRAINOSUS, NOT INTRACTABLE: ICD-10-CM

## 2024-05-30 PROCEDURE — 99214 OFFICE O/P EST MOD 30 MIN: CPT | Performed by: FAMILY MEDICINE

## 2024-05-30 PROCEDURE — 99214 OFFICE O/P EST MOD 30 MIN: CPT | Performed by: NURSE PRACTITIONER

## 2024-05-30 RX ORDER — IPRATROPIUM BROMIDE 42 UG/1
2 SPRAY, METERED NASAL 4 TIMES DAILY
Qty: 15 ML | Refills: 3 | Status: SHIPPED | OUTPATIENT
Start: 2024-05-30

## 2024-05-30 RX ORDER — OMEPRAZOLE 40 MG/1
40 CAPSULE, DELAYED RELEASE ORAL DAILY
Qty: 30 CAPSULE | Refills: 5 | Status: SHIPPED | OUTPATIENT
Start: 2024-05-30

## 2024-05-30 RX ORDER — TOPIRAMATE 50 MG/1
100 TABLET, FILM COATED ORAL 2 TIMES DAILY
Qty: 200 TABLET | Refills: 3 | Status: SHIPPED | OUTPATIENT
Start: 2024-05-30

## 2024-05-30 RX ORDER — CEFDINIR 300 MG/1
300 CAPSULE ORAL 2 TIMES DAILY
Qty: 20 CAPSULE | Refills: 0 | Status: SHIPPED | OUTPATIENT
Start: 2024-05-30 | End: 2024-05-30

## 2024-05-30 ASSESSMENT — ENCOUNTER SYMPTOMS
GASTROINTESTINAL NEGATIVE: 1
RESPIRATORY NEGATIVE: 1
EYES NEGATIVE: 1
ALLERGIC/IMMUNOLOGIC NEGATIVE: 1

## 2024-05-30 NOTE — PROGRESS NOTES
REVIEW OF SYSTEMS    Constitutional: []Fever []Sweat []Chills [] Recent Injury [x] Denies all unless marked  HEENT:[x]Headache  [] Head Injury/Hearing Loss  [] Sore Throat  [] Ear Ache/Dizziness  [x] Denies all unless marked  Spine:  [] Neck pain  [] Back pain  [] Sciaticia  [x] Denies all unless marked  Cardiovascular:[]Heart Disease []Chest Pain [] Palpitations  [x] Denies all unless marked  Pulmonary: []Shortness of Breath []Cough   [x] Denies all unless marke  Gastrointestinal: []Nausea  []Vomiting  []Abdominal Pain  []Constipation  []Diarrhea  []Dark Bloody Stools  [x] Denies all unless marked  Psychiatric/Behavioral:[] Depression [] Anxiety [x] Denies all unless marked  Genitourinary:   [] Frequency  [] Urgency  [] Incontinence [] Pain with Urination  [x] Denies all unless marked  Extremities: []Pain  []Swelling  [x] Denies all unless marked  Musculoskeletal: [] Muscle Pain  [] Joint Pain  [] Arthritis [] Muscle Cramps [] Muscle Twitches  [x] Denies all unless marked  Sleep: [] Insomnia [] Snoring [] Restless Legs [] Sleep Apnea  [] Daytime Sleepiness  [x] Denies all unless marked  Skin:[] Rash [] Skin Discoloration [x] Denies all unless marked   Neurological: []Visual Disturbance/Memory Loss [] Loss of Balance [] Slurred Speech/Weakness [x] Seizures  [] Vertigo/Dizziness [x] Denies all unless marked       
medication, side effects, and disease progression.   15. Return in about 3 months (around 8/30/2024) for Schedule with Dr. Clark Davison.     GENARO Burroughs CNP     I spent 38 minutes caring for Sherie Renae on this date of service. This time includes time spent by me in the following activities: preparing for the visit, reviewing tests, performing a medically appropriate examination and/or evaluation , counseling and educating the patient/family/caregiver, ordering medications, tests, or procedures, documenting information in the medical record and care coordination.      This dictation was generated by voice recognition computer software.  Although all attempts were made to edit the dictation for accuracy, there may be errors in the transcription that are not intended.

## 2024-05-30 NOTE — PROGRESS NOTES
SUBJECTIVE:    Patient ID: Sherie Renae is a 28 y.o.female.    HPI:   Patient here for follow-up of multiple medical problems  Patient is 28-year-old female.  She complains of a 1 week history of sinus congestion pressure postnasal drainage and sore throat.  Just do not feel well.  Nausea without vomiting.  No diarrhea.  No sick contacts.  Patient also complains of acid reflux not controlled with Protonix.  Request to be switched to omeprazole.  She used that in the past and she thought she was having better results with that.  I encouraged her to monitor her diet.  She also can use acid neutralizes on demand like Tums or Mylanta.         Past Medical History:   Diagnosis Date    Allergic rhinitis     Migraines     PTSD (post-traumatic stress disorder)     Schizoaffective disorder (HCC)       Current Outpatient Medications   Medication Sig Dispense Refill    cefdinir (OMNICEF) 300 MG capsule Take 1 capsule by mouth 2 times daily for 10 days 20 capsule 0    ipratropium (ATROVENT) 0.06 % nasal spray 2 sprays by Each Nostril route 4 times daily 15 mL 3    omeprazole (PRILOSEC) 40 MG delayed release capsule Take 1 capsule by mouth daily 30 capsule 5    hydrOXYzine HCl (ATARAX) 25 MG tablet Take 1 tablet by mouth 3 times daily as needed for Anxiety 90 tablet 0    gabapentin (NEURONTIN) 300 MG capsule Take 1 capsule by mouth 3 times daily for 30 days. Intended supply: 30 days Max Daily Amount: 900 mg 90 capsule 0    QUEtiapine (SEROQUEL) 300 MG tablet Take 1 tablet by mouth nightly 30 tablet 2    VIIBRYD 20 MG Tablet Take 1 tablet by mouth daily 30 tablet 2    lithium 150 MG capsule Take 1 capsule by mouth 2 times daily (with meals) 60 capsule 2    butalbital-aspirin-caffeine (FIORINAL) -40 MG capsule Take 1 capsule by mouth every 4 hours as needed for Headaches for up to 30 days. Max Daily Amount: 6 capsules 15 capsule 1    rimegepant sulfate (NURTEC) 75 MG TBDP Take 1 tablet at the onset of migraine. Do not

## 2024-05-30 NOTE — PATIENT INSTRUCTIONS
Topamax   Start taking 1 pill in the morning and two pills nightly for 7 days     Increase to two pills in the morning and two pills nightly after this if no side effects.

## 2024-06-10 DIAGNOSIS — F34.9 PERSISTENT MOOD (AFFECTIVE) DISORDER, UNSPECIFIED (HCC): ICD-10-CM

## 2024-06-10 RX ORDER — VILAZODONE HYDROCHLORIDE 20 MG/1
20 TABLET ORAL DAILY
Qty: 30 TABLET | Refills: 2 | OUTPATIENT
Start: 2024-06-10

## 2024-06-24 DIAGNOSIS — F41.1 GAD (GENERALIZED ANXIETY DISORDER): ICD-10-CM

## 2024-06-24 RX ORDER — GABAPENTIN 300 MG/1
300 CAPSULE ORAL 3 TIMES DAILY
Qty: 90 CAPSULE | Refills: 0 | Status: SHIPPED | OUTPATIENT
Start: 2024-06-24 | End: 2024-07-24

## 2024-06-24 NOTE — TELEPHONE ENCOUNTER
up.  6. The patient has been advised to call with any problems.  7. Controlled substance Treatment Plan: this is a maintenance dose..  8. The above listed medications have been continued, modifications in meds and other orders/labs as follows:                 Encounter Medications        Orders Placed This Encounter   Medications    hydrOXYzine HCl (ATARAX) 25 MG tablet       Sig: Take 1 tablet by mouth 3 times daily as needed for Anxiety       Dispense:  90 tablet       Refill:  0                       No orders of the defined types were placed in this encounter.        9. Additional comments: continue therapy, discussed sleep hygiene, discussed the use of coping skills and relaxation strategies to manage symptoms.            10.Over 50% of the total visit time of   30  minutes was spent on counseling and/or coordination of care of:                         1. Persistent mood (affective) disorder, unspecified (HCC)    2. RIVER (generalized anxiety disorder)    3. Chronic post-traumatic stress disorder (PTSD)    4. Borderline personality disorder (HCC)    5. Insomnia, unspecified type                                       Psychotherapy Topics: mood/medication effectiveness family     GENARO Orellana - CNP

## 2024-06-27 DIAGNOSIS — F34.9 PERSISTENT MOOD (AFFECTIVE) DISORDER, UNSPECIFIED (HCC): ICD-10-CM

## 2024-06-27 DIAGNOSIS — G47.00 INSOMNIA, UNSPECIFIED TYPE: ICD-10-CM

## 2024-06-27 RX ORDER — QUETIAPINE FUMARATE 300 MG/1
300 TABLET, FILM COATED ORAL NIGHTLY
Qty: 30 TABLET | Refills: 2 | Status: SHIPPED | OUTPATIENT
Start: 2024-06-27

## 2024-06-27 NOTE — TELEPHONE ENCOUNTER
Controlled substance Treatment Plan: this is a maintenance dose..  8. The above listed medications have been continued, modifications in meds and other orders/labs as follows:                 Encounter Medications        Orders Placed This Encounter   Medications    hydrOXYzine HCl (ATARAX) 25 MG tablet       Sig: Take 1 tablet by mouth 3 times daily as needed for Anxiety       Dispense:  90 tablet       Refill:  0                       No orders of the defined types were placed in this encounter.        9. Additional comments: continue therapy, discussed sleep hygiene, discussed the use of coping skills and relaxation strategies to manage symptoms.            10.Over 50% of the total visit time of   30  minutes was spent on counseling and/or coordination of care of:                         1. Persistent mood (affective) disorder, unspecified (HCC)    2. RIVER (generalized anxiety disorder)    3. Chronic post-traumatic stress disorder (PTSD)    4. Borderline personality disorder (HCC)    5. Insomnia, unspecified type                                       Psychotherapy Topics: mood/medication effectiveness family     Anamika Monique, APRN - CNP

## 2024-07-11 DIAGNOSIS — R51.9 NONINTRACTABLE HEADACHE, UNSPECIFIED CHRONICITY PATTERN, UNSPECIFIED HEADACHE TYPE: ICD-10-CM

## 2024-07-11 DIAGNOSIS — F34.9 PERSISTENT MOOD (AFFECTIVE) DISORDER, UNSPECIFIED (HCC): ICD-10-CM

## 2024-07-12 RX ORDER — BUTALBITAL, ASPIRIN, AND CAFFEINE 325; 50; 40 MG/1; MG/1; MG/1
1 CAPSULE ORAL EVERY 4 HOURS PRN
Qty: 15 CAPSULE | Refills: 1 | Status: SHIPPED | OUTPATIENT
Start: 2024-07-12 | End: 2024-08-11

## 2024-07-12 RX ORDER — VILAZODONE HYDROCHLORIDE 20 MG/1
20 TABLET ORAL DAILY
Qty: 30 TABLET | Refills: 2 | Status: SHIPPED | OUTPATIENT
Start: 2024-07-12

## 2024-07-12 NOTE — TELEPHONE ENCOUNTER
about a year ago, was using about 2-3 times per month  Tobacco: denies   Vape: denies         BP: /78 (Site: Right Upper Arm, Position: Sitting)   Pulse (!) 110   Temp 98.1 °F (36.7 °C) (Temporal)   Resp 20   Ht 1.549 m (5' 1\")   Wt 45.7 kg (100 lb 11.2 oz)   SpO2 100%   BMI 19.03 kg/m²          Review of Systems - 14 point review:  Negative      Constitutional: (fevers, chills, night sweats, wt loss/gain, change in appetite, fatigue, somnolence)     HEENT: (ear pain or discharge, hearing loss, ear ringing, sinus pressure, nosebleed, nasal discharge, sore throat, oral sores, tooth pain, bleeding gums, hoarse voice, neck pain)      Cardiovascular: (HTN, chest pain, elevated cholesterol/lipids, palpitations, leg swelling, leg pain with walking)     Respiratory: (cough, wheezing, snoring, SOB with activity (dyspnea), SOB while lying flat (orthopnea), awakening with severe SOB (paroxysmal nocturnal dyspnea))     Gastrointestinal: (NVD, constipation, abdominal pain, bright red stools, black tarry stools, stool incontinence)     Genitourinary:  (pelvic pain, burning or frequency of urination, urinary urgency, blood in urine incomplete bladder emptying, urinary incontinence, STD; MEN: testicular pain or swelling, erectile dysfunction; WOMEN: LMP, heavy menstrual bleeding (menorrhagia), irregular periods, postmenopausal bleeding, menstrual pain (dymenorrhea, vaginal discharge)     Musculoskeletal: (bone pain/fracture, joint pain or swelling, musle pain)     Integumentary: (rashes, acne, non-healing sores, itching, breast lumps, breast pain, nipple discharge, hair loss)     Neurologic: (HA, muscle weakness, paresthesias (numbness, coldness, crawling or prickling), memory loss, seizure (possible PNES), dizziness)     Psychiatric:  (anxiety, sadness, irritability/anger, insomnia, suicidality)     Endocrine: (heat or cold intolerance, excessive thirst (polydipsia), excessive hunger (polyphagia))

## 2024-07-22 ENCOUNTER — PATIENT MESSAGE (OUTPATIENT)
Dept: FAMILY MEDICINE CLINIC | Age: 29
End: 2024-07-22

## 2024-07-22 DIAGNOSIS — M79.7 FIBROMYALGIA: ICD-10-CM

## 2024-07-23 DIAGNOSIS — R56.9 CONVULSIONS, UNSPECIFIED CONVULSION TYPE (HCC): ICD-10-CM

## 2024-07-23 DIAGNOSIS — M79.7 FIBROMYALGIA: ICD-10-CM

## 2024-07-23 DIAGNOSIS — G43.109 MIGRAINE WITH AURA AND WITHOUT STATUS MIGRAINOSUS, NOT INTRACTABLE: ICD-10-CM

## 2024-07-23 RX ORDER — ATOGEPANT 60 MG/1
1 TABLET ORAL DAILY
Qty: 30 TABLET | Refills: 3 | Status: SHIPPED | OUTPATIENT
Start: 2024-07-23

## 2024-07-23 RX ORDER — TRAMADOL HYDROCHLORIDE 50 MG/1
50 TABLET ORAL EVERY 6 HOURS PRN
Qty: 30 TABLET | Refills: 2 | Status: SHIPPED | OUTPATIENT
Start: 2024-07-23 | End: 2024-08-22

## 2024-07-23 RX ORDER — TRAMADOL HYDROCHLORIDE 50 MG/1
50 TABLET ORAL EVERY 6 HOURS PRN
Qty: 30 TABLET | Refills: 2 | Status: CANCELLED | OUTPATIENT
Start: 2024-07-23 | End: 2024-08-22

## 2024-07-23 NOTE — TELEPHONE ENCOUNTER
From: Sherie Renae  To: Dr. Valente Le  Sent: 7/22/2024 9:46 PM CDT  Subject: Tramadol     Could you send in refills for my tramadol?

## 2024-07-23 NOTE — TELEPHONE ENCOUNTER
Requested Prescriptions     Pending Prescriptions Disp Refills    QULIPTA 60 MG TABS [Pharmacy Med Name: QULIPTA 60 MG TABS 60 Tablet] 30 tablet 3     Sig: Take 1 tablet by mouth daily       Last Office Visit: 5/30/2024  Next Office Visit: 7/30/2024  Last Medication Refill: 3/7/2024 with 3 RF

## 2024-07-25 DIAGNOSIS — G47.00 INSOMNIA, UNSPECIFIED TYPE: ICD-10-CM

## 2024-07-25 DIAGNOSIS — F34.9 PERSISTENT MOOD (AFFECTIVE) DISORDER, UNSPECIFIED (HCC): ICD-10-CM

## 2024-07-26 RX ORDER — QUETIAPINE FUMARATE 300 MG/1
300 TABLET, FILM COATED ORAL NIGHTLY
Qty: 30 TABLET | Refills: 2 | OUTPATIENT
Start: 2024-07-26

## 2024-07-30 ENCOUNTER — TELEPHONE (OUTPATIENT)
Dept: PSYCHIATRY | Age: 29
End: 2024-07-30

## 2024-07-30 NOTE — TELEPHONE ENCOUNTER
Pt sent message stating\"  I have been having more mood swings, crying fits and depression. Days where I stay in bed all day and then days where my mood is very elevated. My family is starting to notice. Do you have any appointments sooner than the one we have scheduled? \"    Above info was giving to Anamika LAM      Per provider lvm and sent message letting pt know that we can get her in on 7/31 @ 3 or 3:30PM.     Pt hasn't responded to either messages.      Electronically signed by Niraj Contreras on 7/30/2024 at 12:06 PM

## 2024-07-31 ENCOUNTER — TELEPHONE (OUTPATIENT)
Dept: PSYCHIATRY | Age: 29
End: 2024-07-31

## 2024-07-31 NOTE — TELEPHONE ENCOUNTER
Patient was a late cancellation for [facility].      Patient's appointment was rescheduled. Discussed the three late cancellation and dismissal policies with the patient. Provided education that after three late cancellations, patients can be dismissed by the provider and practice. Patient verbally understood.    Discussed the three no show and dismissal policies with the patient. Provided education that after three no show, patients can be dismissed by the provider and practice.     Pt does not have a ride to get to the appt.    Rescheduled for 8/6/24 @ 4:00    Electronically signed by Giuliana Lakhani MA on 7/31/2024 at 3:05 PM

## 2024-08-07 ENCOUNTER — TELEPHONE (OUTPATIENT)
Dept: PSYCHIATRY | Age: 29
End: 2024-08-07

## 2024-08-07 NOTE — TELEPHONE ENCOUNTER
Patient no showed to their appointment on [facility]. Office called the patient to check on them and to reschedule their appointment.     Patient's appointment was rescheduled.    Barriers to treatment:  Health Issues       Electronically signed by Ana Alvarado on 8/7/2024 at 2:18 PM

## 2024-08-09 ENCOUNTER — TELEPHONE (OUTPATIENT)
Dept: PSYCHIATRY | Age: 29
End: 2024-08-09

## 2024-08-09 NOTE — TELEPHONE ENCOUNTER
Called pt on 08/08/24 to remind them of their appt for 08/09/24    -Pt confirmed    Electronically signed by Giuliana Lakhani MA on 8/9/2024 at 11:21 AM

## 2024-08-15 ENCOUNTER — TELEPHONE (OUTPATIENT)
Dept: PSYCHIATRY | Age: 29
End: 2024-08-15

## 2024-08-15 NOTE — TELEPHONE ENCOUNTER
Patient reviewed for dismissal by  leadership per GENARO العراقي request. Patient no showed on 8/9/24, 8/6/24 and 11/17/23.  Patient had late cancellations on 7/31/2024, 4/15/2024 and 10/4/2023. Patient will be dismissed from [facility] as of 8/15/24 due to noncompliance with appointments.  Urgent care only until 30 days after 8/15/24. Patient was notified of no show/dismissal policy via phone on 7/31/2024 and via no show letters.

## 2024-08-20 ENCOUNTER — TELEPHONE (OUTPATIENT)
Dept: PSYCHIATRY | Age: 29
End: 2024-08-20

## 2024-08-20 DIAGNOSIS — G47.00 INSOMNIA, UNSPECIFIED TYPE: ICD-10-CM

## 2024-08-20 DIAGNOSIS — F41.1 GAD (GENERALIZED ANXIETY DISORDER): ICD-10-CM

## 2024-08-20 DIAGNOSIS — F34.9 PERSISTENT MOOD (AFFECTIVE) DISORDER, UNSPECIFIED (HCC): ICD-10-CM

## 2024-08-20 RX ORDER — GABAPENTIN 300 MG/1
300 CAPSULE ORAL 3 TIMES DAILY
Qty: 90 CAPSULE | Refills: 0 | Status: SHIPPED | OUTPATIENT
Start: 2024-08-20 | End: 2024-09-19

## 2024-08-20 RX ORDER — QUETIAPINE FUMARATE 300 MG/1
300 TABLET, FILM COATED ORAL NIGHTLY
Qty: 30 TABLET | Refills: 2 | OUTPATIENT
Start: 2024-08-20

## 2024-08-20 RX ORDER — GABAPENTIN 300 MG/1
300 CAPSULE ORAL 3 TIMES DAILY
Qty: 90 CAPSULE | Refills: 0 | Status: CANCELLED | OUTPATIENT
Start: 2024-08-20 | End: 2024-09-19

## 2024-08-20 NOTE — TELEPHONE ENCOUNTER
Sherie Renae called to request a refill on her medication.    Shiv under media and attached.    Last UDS obtained 10/10/23.  UDS from 4/9/24 in [facility] ordered by another provider.      Last office visit : 5/13/2024   Next office visit : Visit date not found -Pt was dismissed on 8/15/24.    Requested Prescriptions     Pending Prescriptions Disp Refills    gabapentin (NEURONTIN) 300 MG capsule 90 capsule 0     Sig: Take 1 capsule by mouth 3 times daily for 30 days. Intended supply: 30 days Max Daily Amount: 900 mg            Nataliia Jones RN     5/13/24                                         Progress Note     Sherie Renae 1995                            Chief Complaint   Patient presents with    Medication Check            Subjective:    Patient is a 28 y.o. female diagnosed with persistent mood disorder, anxiety, PTSD, insomnia, BPD, PNES, and presents today for follow-up.  Last seen in clinic on 4/18/24  and prior records were reviewed.     Seen in the office. Increased Seroquel slightly last time due to patient reporting auditory and visual hallucinations intermittently. Denies any hallucinations today. Is not responding to internal stimuli. Reports compliance with medications, denies side effects. Reports increased anxiety due to PCP no longer prescribing Xanax. Calm and cooperative during interview. Will initiate hydroxyzine as needed for anxiety. Patient is going to therapy for with [facility]. Need lithium level for monitoring, order placed last time, reminded patient about compliance, patient receptive. Will follow up in three months.      Absent  suicidal ideation.    Reports compliance with medications as fair .      Sleep: sleeping well     Caffeine use:  tea, coffee, occasional soda, occasional energy drink     PREVIOUS MED TRIALS  Xanax  Zoloft (N/V)  Seroquel  Paxil (SI)  Amitriptyline (SI)  Trazodone (N/V)  Mirtazapine (Nausea)  Prazosin (dizziness)  Pristiq (\"having

## 2024-08-20 NOTE — TELEPHONE ENCOUNTER
Pt informed that refill RX for Gabapentin 300 mg had been sent to her pharmacy per DEBORAH LAM.  Pt informed that the APRN wanted her informed that this will be the last refill on this medication-will need to talk to PCP or Neurology to continue.  Pt informed that her request for appeal of the dismissal was denied.  Offered to send a referral to another [facility] provider-pt declined.  \"I will find one on my own\".  Pt declined wanting a provider list mailed to her.

## 2024-08-25 DIAGNOSIS — R56.9 CONVULSIONS, UNSPECIFIED CONVULSION TYPE (HCC): ICD-10-CM

## 2024-08-25 DIAGNOSIS — G43.109 MIGRAINE WITH AURA AND WITHOUT STATUS MIGRAINOSUS, NOT INTRACTABLE: ICD-10-CM

## 2024-08-26 RX ORDER — ATOGEPANT 60 MG/1
1 TABLET ORAL DAILY
Qty: 30 TABLET | Refills: 3 | Status: SHIPPED | OUTPATIENT
Start: 2024-08-26

## 2024-08-26 NOTE — TELEPHONE ENCOUNTER
Requested Prescriptions     Pending Prescriptions Disp Refills    Atogepant (QULIPTA) 60 MG TABS 30 tablet 3     Sig: Take 1 tablet by mouth daily       Last Office Visit: 5/30/2024  Next Office Visit: 9/17/2024  Last Medication Refill: 7/23/2024 with 3 RF

## 2024-09-05 ENCOUNTER — TELEPHONE (OUTPATIENT)
Dept: PSYCHIATRY | Age: 29
End: 2024-09-05

## 2024-09-05 NOTE — TELEPHONE ENCOUNTER
Called pt to let her know that her appeal for the dismissal from the clinic will stand and that she is dismissed from the clinic.  There was no answer and left a voicemail.  MA informed pt that we can sent a referral somewhere for her to follow up for her medications.  Also, if she needed care that she can go to the ER.  Also, told pt to give our office a call back if she had any other questions.    Electronically signed by Giuliana Lakhani MA on 9/5/2024 at 10:29 AM

## 2024-09-10 ENCOUNTER — HOSPITAL ENCOUNTER (INPATIENT)
Dept: NEUROLOGY | Age: 29
LOS: 2 days | Discharge: HOME | DRG: 101 | End: 2024-09-12
Attending: PSYCHIATRY & NEUROLOGY | Admitting: PSYCHIATRY & NEUROLOGY
Payer: MEDICAID

## 2024-09-10 DIAGNOSIS — F34.9 PERSISTENT MOOD (AFFECTIVE) DISORDER, UNSPECIFIED (HCC): ICD-10-CM

## 2024-09-10 PROBLEM — R56.9 SEIZURE (HCC): Status: ACTIVE | Noted: 2024-09-10

## 2024-09-10 PROCEDURE — 99222 1ST HOSP IP/OBS MODERATE 55: CPT | Performed by: PSYCHIATRY & NEUROLOGY

## 2024-09-10 PROCEDURE — 94760 N-INVAS EAR/PLS OXIMETRY 1: CPT

## 2024-09-10 PROCEDURE — 6370000000 HC RX 637 (ALT 250 FOR IP): Performed by: PSYCHIATRY & NEUROLOGY

## 2024-09-10 PROCEDURE — 95714 VEEG EA 12-26 HR UNMNTR: CPT

## 2024-09-10 PROCEDURE — 6360000002 HC RX W HCPCS: Performed by: PSYCHIATRY & NEUROLOGY

## 2024-09-10 PROCEDURE — 1200000000 HC SEMI PRIVATE

## 2024-09-10 PROCEDURE — 95718 EEG PHYS/QHP 2-12 HR W/VEEG: CPT | Performed by: PSYCHIATRY & NEUROLOGY

## 2024-09-10 RX ORDER — HYDROXYZINE HYDROCHLORIDE 25 MG/1
25 TABLET, FILM COATED ORAL 3 TIMES DAILY PRN
Status: DISCONTINUED | OUTPATIENT
Start: 2024-09-10 | End: 2024-09-12 | Stop reason: HOSPADM

## 2024-09-10 RX ORDER — SODIUM CHLORIDE 9 MG/ML
INJECTION, SOLUTION INTRAVENOUS PRN
Status: DISCONTINUED | OUTPATIENT
Start: 2024-09-10 | End: 2024-09-12 | Stop reason: HOSPADM

## 2024-09-10 RX ORDER — GABAPENTIN 300 MG/1
300 CAPSULE ORAL 3 TIMES DAILY
Status: DISCONTINUED | OUTPATIENT
Start: 2024-09-10 | End: 2024-09-12 | Stop reason: HOSPADM

## 2024-09-10 RX ORDER — LITHIUM CARBONATE 150 MG/1
150 CAPSULE ORAL NIGHTLY
Status: DISCONTINUED | OUTPATIENT
Start: 2024-09-10 | End: 2024-09-12 | Stop reason: HOSPADM

## 2024-09-10 RX ORDER — PANTOPRAZOLE SODIUM 40 MG/1
40 TABLET, DELAYED RELEASE ORAL
Status: DISCONTINUED | OUTPATIENT
Start: 2024-09-11 | End: 2024-09-12 | Stop reason: HOSPADM

## 2024-09-10 RX ORDER — BUTALBITAL, ACETAMINOPHEN AND CAFFEINE 50; 325; 40 MG/1; MG/1; MG/1
1 TABLET ORAL EVERY 6 HOURS PRN
Status: DISCONTINUED | OUTPATIENT
Start: 2024-09-10 | End: 2024-09-12 | Stop reason: HOSPADM

## 2024-09-10 RX ORDER — POLYETHYLENE GLYCOL 3350 17 G/17G
17 POWDER, FOR SOLUTION ORAL DAILY PRN
Status: DISCONTINUED | OUTPATIENT
Start: 2024-09-10 | End: 2024-09-12 | Stop reason: HOSPADM

## 2024-09-10 RX ORDER — SODIUM CHLORIDE 0.9 % (FLUSH) 0.9 %
5-40 SYRINGE (ML) INJECTION EVERY 12 HOURS SCHEDULED
Status: DISCONTINUED | OUTPATIENT
Start: 2024-09-10 | End: 2024-09-12 | Stop reason: HOSPADM

## 2024-09-10 RX ORDER — DICYCLOMINE HCL 20 MG
20 TABLET ORAL 4 TIMES DAILY PRN
Status: DISCONTINUED | OUTPATIENT
Start: 2024-09-10 | End: 2024-09-12 | Stop reason: HOSPADM

## 2024-09-10 RX ORDER — LOPERAMIDE HCL 2 MG
2 CAPSULE ORAL PRN
Status: DISCONTINUED | OUTPATIENT
Start: 2024-09-10 | End: 2024-09-12 | Stop reason: HOSPADM

## 2024-09-10 RX ORDER — ENOXAPARIN SODIUM 100 MG/ML
30 INJECTION SUBCUTANEOUS EVERY 24 HOURS
Status: DISCONTINUED | OUTPATIENT
Start: 2024-09-10 | End: 2024-09-12 | Stop reason: HOSPADM

## 2024-09-10 RX ORDER — ONDANSETRON 8 MG/1
4 TABLET, FILM COATED ORAL EVERY 8 HOURS PRN
Status: DISCONTINUED | OUTPATIENT
Start: 2024-09-10 | End: 2024-09-12 | Stop reason: HOSPADM

## 2024-09-10 RX ORDER — LORAZEPAM 2 MG/ML
1 INJECTION INTRAMUSCULAR PRN
Status: DISCONTINUED | OUTPATIENT
Start: 2024-09-10 | End: 2024-09-12 | Stop reason: HOSPADM

## 2024-09-10 RX ORDER — VILAZODONE HYDROCHLORIDE 10 MG/1
20 TABLET ORAL DAILY
Status: DISCONTINUED | OUTPATIENT
Start: 2024-09-10 | End: 2024-09-12 | Stop reason: HOSPADM

## 2024-09-10 RX ORDER — SODIUM CHLORIDE 0.9 % (FLUSH) 0.9 %
5-40 SYRINGE (ML) INJECTION PRN
Status: DISCONTINUED | OUTPATIENT
Start: 2024-09-10 | End: 2024-09-12 | Stop reason: HOSPADM

## 2024-09-10 RX ADMIN — QUETIAPINE FUMARATE 300 MG: 100 TABLET ORAL at 22:06

## 2024-09-10 RX ADMIN — GABAPENTIN 300 MG: 300 CAPSULE ORAL at 18:24

## 2024-09-10 RX ADMIN — LITHIUM CARBONATE 150 MG: 150 CAPSULE, GELATIN COATED ORAL at 22:06

## 2024-09-10 RX ADMIN — ENOXAPARIN SODIUM 30 MG: 100 INJECTION SUBCUTANEOUS at 22:06

## 2024-09-10 RX ADMIN — GABAPENTIN 300 MG: 300 CAPSULE ORAL at 22:06

## 2024-09-10 RX ADMIN — VILAZODONE HYDROCHLORIDE 20 MG: 10 TABLET, FILM COATED ORAL at 18:24

## 2024-09-10 SDOH — ECONOMIC STABILITY: FOOD INSECURITY: WITHIN THE PAST 12 MONTHS, YOU WORRIED THAT YOUR FOOD WOULD RUN OUT BEFORE YOU GOT MONEY TO BUY MORE.: NEVER TRUE

## 2024-09-10 SDOH — ECONOMIC STABILITY: INCOME INSECURITY: IN THE PAST 12 MONTHS, HAS THE ELECTRIC, GAS, OIL, OR WATER COMPANY THREATENED TO SHUT OFF SERVICE IN YOUR HOME?: NO

## 2024-09-10 SDOH — ECONOMIC STABILITY: INCOME INSECURITY: HOW HARD IS IT FOR YOU TO PAY FOR THE VERY BASICS LIKE FOOD, HOUSING, MEDICAL CARE, AND HEATING?: NOT HARD AT ALL

## 2024-09-10 ASSESSMENT — PATIENT HEALTH QUESTIONNAIRE - PHQ9
SUM OF ALL RESPONSES TO PHQ QUESTIONS 1-9: 0
SUM OF ALL RESPONSES TO PHQ9 QUESTIONS 1 & 2: 0
1. LITTLE INTEREST OR PLEASURE IN DOING THINGS: NOT AT ALL
SUM OF ALL RESPONSES TO PHQ QUESTIONS 1-9: 0
2. FEELING DOWN, DEPRESSED OR HOPELESS: NOT AT ALL

## 2024-09-10 ASSESSMENT — PAIN SCALES - GENERAL
PAINLEVEL_OUTOF10: 0
PAINLEVEL_OUTOF10: 0

## 2024-09-11 PROCEDURE — 95714 VEEG EA 12-26 HR UNMNTR: CPT

## 2024-09-11 PROCEDURE — 6370000000 HC RX 637 (ALT 250 FOR IP): Performed by: PSYCHIATRY & NEUROLOGY

## 2024-09-11 PROCEDURE — 95720 EEG PHY/QHP EA INCR W/VEEG: CPT | Performed by: PSYCHIATRY & NEUROLOGY

## 2024-09-11 PROCEDURE — 99232 SBSQ HOSP IP/OBS MODERATE 35: CPT | Performed by: PSYCHIATRY & NEUROLOGY

## 2024-09-11 PROCEDURE — 1200000000 HC SEMI PRIVATE

## 2024-09-11 PROCEDURE — 2580000003 HC RX 258: Performed by: PSYCHIATRY & NEUROLOGY

## 2024-09-11 PROCEDURE — 6360000002 HC RX W HCPCS: Performed by: PSYCHIATRY & NEUROLOGY

## 2024-09-11 PROCEDURE — 94760 N-INVAS EAR/PLS OXIMETRY 1: CPT

## 2024-09-11 RX ADMIN — GABAPENTIN 300 MG: 300 CAPSULE ORAL at 10:24

## 2024-09-11 RX ADMIN — QUETIAPINE FUMARATE 300 MG: 100 TABLET ORAL at 20:47

## 2024-09-11 RX ADMIN — SODIUM CHLORIDE, PRESERVATIVE FREE 10 ML: 5 INJECTION INTRAVENOUS at 20:48

## 2024-09-11 RX ADMIN — GABAPENTIN 300 MG: 300 CAPSULE ORAL at 20:47

## 2024-09-11 RX ADMIN — PANTOPRAZOLE SODIUM 40 MG: 40 TABLET, DELAYED RELEASE ORAL at 05:36

## 2024-09-11 RX ADMIN — SODIUM CHLORIDE, PRESERVATIVE FREE 10 ML: 5 INJECTION INTRAVENOUS at 10:26

## 2024-09-11 RX ADMIN — LITHIUM CARBONATE 150 MG: 150 CAPSULE, GELATIN COATED ORAL at 20:47

## 2024-09-11 RX ADMIN — GABAPENTIN 300 MG: 300 CAPSULE ORAL at 12:55

## 2024-09-11 RX ADMIN — ENOXAPARIN SODIUM 30 MG: 100 INJECTION SUBCUTANEOUS at 20:47

## 2024-09-11 RX ADMIN — VILAZODONE HYDROCHLORIDE 20 MG: 10 TABLET, FILM COATED ORAL at 10:23

## 2024-09-12 VITALS
BODY MASS INDEX: 19.26 KG/M2 | TEMPERATURE: 98.3 F | WEIGHT: 102 LBS | OXYGEN SATURATION: 100 % | RESPIRATION RATE: 16 BRPM | HEIGHT: 61 IN | SYSTOLIC BLOOD PRESSURE: 96 MMHG | HEART RATE: 83 BPM | DIASTOLIC BLOOD PRESSURE: 67 MMHG

## 2024-09-12 PROCEDURE — 94760 N-INVAS EAR/PLS OXIMETRY 1: CPT

## 2024-09-12 PROCEDURE — 95718 EEG PHYS/QHP 2-12 HR W/VEEG: CPT | Performed by: PSYCHIATRY & NEUROLOGY

## 2024-09-12 PROCEDURE — 6370000000 HC RX 637 (ALT 250 FOR IP): Performed by: PSYCHIATRY & NEUROLOGY

## 2024-09-12 PROCEDURE — 99238 HOSP IP/OBS DSCHRG MGMT 30/<: CPT | Performed by: PSYCHIATRY & NEUROLOGY

## 2024-09-12 RX ADMIN — VILAZODONE HYDROCHLORIDE 20 MG: 10 TABLET, FILM COATED ORAL at 09:09

## 2024-09-12 RX ADMIN — PANTOPRAZOLE SODIUM 40 MG: 40 TABLET, DELAYED RELEASE ORAL at 05:09

## 2024-09-12 RX ADMIN — GABAPENTIN 300 MG: 300 CAPSULE ORAL at 09:09

## 2024-09-12 ASSESSMENT — PAIN SCALES - GENERAL: PAINLEVEL_OUTOF10: 0

## 2024-09-13 ENCOUNTER — TELEPHONE (OUTPATIENT)
Dept: PSYCHIATRY | Age: 29
End: 2024-09-13

## 2024-09-13 ENCOUNTER — PATIENT MESSAGE (OUTPATIENT)
Dept: FAMILY MEDICINE CLINIC | Age: 29
End: 2024-09-13

## 2024-09-13 DIAGNOSIS — F34.9 PERSISTENT MOOD (AFFECTIVE) DISORDER, UNSPECIFIED (HCC): ICD-10-CM

## 2024-09-13 DIAGNOSIS — G47.00 INSOMNIA, UNSPECIFIED TYPE: ICD-10-CM

## 2024-09-13 DIAGNOSIS — R51.9 NONINTRACTABLE HEADACHE, UNSPECIFIED CHRONICITY PATTERN, UNSPECIFIED HEADACHE TYPE: ICD-10-CM

## 2024-09-13 RX ORDER — BUTALBITAL, ASPIRIN, AND CAFFEINE 325; 50; 40 MG/1; MG/1; MG/1
1 CAPSULE ORAL EVERY 4 HOURS PRN
Qty: 15 CAPSULE | Refills: 0 | Status: SHIPPED | OUTPATIENT
Start: 2024-09-13 | End: 2024-10-13

## 2024-09-13 RX ORDER — LITHIUM CARBONATE 150 MG/1
150 CAPSULE ORAL 2 TIMES DAILY WITH MEALS
Qty: 60 CAPSULE | Refills: 2 | Status: SHIPPED | OUTPATIENT
Start: 2024-09-13

## 2024-09-13 RX ORDER — VILAZODONE HYDROCHLORIDE 20 MG/1
20 TABLET ORAL DAILY
Qty: 30 TABLET | Refills: 0 | Status: SHIPPED | OUTPATIENT
Start: 2024-09-13 | End: 2024-10-13

## 2024-09-13 RX ORDER — QUETIAPINE FUMARATE 300 MG/1
300 TABLET, FILM COATED ORAL NIGHTLY
Qty: 30 TABLET | Refills: 0 | Status: SHIPPED | OUTPATIENT
Start: 2024-09-13 | End: 2024-10-13

## 2024-09-27 DIAGNOSIS — M79.7 FIBROMYALGIA: ICD-10-CM

## 2024-09-27 DIAGNOSIS — G47.00 INSOMNIA, UNSPECIFIED TYPE: ICD-10-CM

## 2024-09-27 DIAGNOSIS — F34.9 PERSISTENT MOOD (AFFECTIVE) DISORDER, UNSPECIFIED (HCC): ICD-10-CM

## 2024-09-27 RX ORDER — QUETIAPINE FUMARATE 300 MG/1
300 TABLET, FILM COATED ORAL NIGHTLY
Qty: 30 TABLET | Refills: 2 | OUTPATIENT
Start: 2024-09-27

## 2024-09-30 RX ORDER — TRAMADOL HYDROCHLORIDE 50 MG/1
50 TABLET ORAL EVERY 6 HOURS PRN
Qty: 30 TABLET | Refills: 2 | Status: SHIPPED | OUTPATIENT
Start: 2024-09-30 | End: 2024-10-30

## 2024-10-08 ENCOUNTER — OFFICE VISIT (OUTPATIENT)
Dept: FAMILY MEDICINE CLINIC | Age: 29
End: 2024-10-08
Payer: MEDICAID

## 2024-10-08 VITALS
RESPIRATION RATE: 16 BRPM | WEIGHT: 107 LBS | OXYGEN SATURATION: 100 % | BODY MASS INDEX: 20.2 KG/M2 | SYSTOLIC BLOOD PRESSURE: 110 MMHG | TEMPERATURE: 98.9 F | DIASTOLIC BLOOD PRESSURE: 68 MMHG | HEIGHT: 61 IN | HEART RATE: 97 BPM

## 2024-10-08 DIAGNOSIS — J02.9 SORE THROAT: ICD-10-CM

## 2024-10-08 DIAGNOSIS — B96.89 ACUTE BACTERIAL SINUSITIS: Primary | ICD-10-CM

## 2024-10-08 DIAGNOSIS — J01.90 ACUTE BACTERIAL SINUSITIS: Primary | ICD-10-CM

## 2024-10-08 LAB — S PYO AG THROAT QL: NORMAL

## 2024-10-08 PROCEDURE — 99213 OFFICE O/P EST LOW 20 MIN: CPT | Performed by: FAMILY MEDICINE

## 2024-10-08 PROCEDURE — 87880 STREP A ASSAY W/OPTIC: CPT | Performed by: FAMILY MEDICINE

## 2024-10-08 RX ORDER — DEXAMETHASONE SODIUM PHOSPHATE 10 MG/ML
4 INJECTION INTRAMUSCULAR; INTRAVENOUS ONCE
Status: COMPLETED | OUTPATIENT
Start: 2024-10-08 | End: 2024-10-08

## 2024-10-08 RX ORDER — IPRATROPIUM BROMIDE 42 UG/1
2 SPRAY, METERED NASAL 4 TIMES DAILY
Qty: 15 ML | Refills: 3 | Status: SHIPPED | OUTPATIENT
Start: 2024-10-08

## 2024-10-08 RX ORDER — CEFTRIAXONE 1 G/1
1000 INJECTION, POWDER, FOR SOLUTION INTRAMUSCULAR; INTRAVENOUS ONCE
Status: COMPLETED | OUTPATIENT
Start: 2024-10-08 | End: 2024-10-08

## 2024-10-08 RX ORDER — CEFDINIR 300 MG/1
300 CAPSULE ORAL 2 TIMES DAILY
Qty: 20 CAPSULE | Refills: 0 | Status: SHIPPED | OUTPATIENT
Start: 2024-10-08 | End: 2024-10-18

## 2024-10-08 RX ORDER — TRIAMCINOLONE ACETONIDE 40 MG/ML
40 INJECTION, SUSPENSION INTRA-ARTICULAR; INTRAMUSCULAR ONCE
Status: COMPLETED | OUTPATIENT
Start: 2024-10-08 | End: 2024-10-08

## 2024-10-08 RX ADMIN — CEFTRIAXONE 1000 MG: 1 INJECTION, POWDER, FOR SOLUTION INTRAMUSCULAR; INTRAVENOUS at 14:33

## 2024-10-08 RX ADMIN — DEXAMETHASONE SODIUM PHOSPHATE 4 MG: 10 INJECTION INTRAMUSCULAR; INTRAVENOUS at 14:34

## 2024-10-08 RX ADMIN — TRIAMCINOLONE ACETONIDE 40 MG: 40 INJECTION, SUSPENSION INTRA-ARTICULAR; INTRAMUSCULAR at 14:35

## 2024-10-08 ASSESSMENT — ENCOUNTER SYMPTOMS
RESPIRATORY NEGATIVE: 1
SINUS PAIN: 1
GASTROINTESTINAL NEGATIVE: 1
SORE THROAT: 1
ALLERGIC/IMMUNOLOGIC NEGATIVE: 1
SINUS PRESSURE: 1
EYES NEGATIVE: 1

## 2024-10-08 NOTE — PROGRESS NOTES
Eyes: Negative.    Respiratory: Negative.     Cardiovascular: Negative.    Gastrointestinal: Negative.    Endocrine: Negative.    Genitourinary: Negative.    Musculoskeletal: Negative.    Skin: Negative.    Allergic/Immunologic: Negative.    Neurological: Negative.    Hematological: Negative.    Psychiatric/Behavioral: Negative.         OBJECTIVE:    Physical Exam  Constitutional:       Appearance: Normal appearance. She is well-developed and well-groomed.   HENT:      Head: Normocephalic and atraumatic.      Right Ear: Tympanic membrane, ear canal and external ear normal. There is no impacted cerumen.      Left Ear: Tympanic membrane, ear canal and external ear normal. There is no impacted cerumen.      Nose: Nose normal.      Mouth/Throat:      Lips: Pink.      Mouth: Mucous membranes are moist.      Dentition: Normal dentition.      Pharynx: Oropharynx is clear. Uvula midline.      Comments: Cobblestone pharynx  Eyes:      General: Lids are normal.         Right eye: No discharge.         Left eye: No discharge.      Extraocular Movements: Extraocular movements intact.      Conjunctiva/sclera: Conjunctivae normal.      Right eye: Right conjunctiva is not injected.      Left eye: Left conjunctiva is not injected.      Pupils: Pupils are equal, round, and reactive to light.   Neck:      Thyroid: No thyromegaly.      Vascular: No carotid bruit or JVD.   Cardiovascular:      Rate and Rhythm: Normal rate and regular rhythm.      Pulses: Normal pulses.           Carotid pulses are 2+ on the right side and 2+ on the left side.       Radial pulses are 2+ on the right side and 2+ on the left side.      Heart sounds: Normal heart sounds, S1 normal and S2 normal. No murmur heard.  Pulmonary:      Effort: Pulmonary effort is normal.      Breath sounds: Normal breath sounds.   Abdominal:      General: Bowel sounds are normal. There is no distension or abdominal bruit.      Palpations: Abdomen is soft. There is no mass.

## 2024-10-09 ENCOUNTER — LAB (OUTPATIENT)
Dept: LAB | Facility: HOSPITAL | Age: 29
End: 2024-10-09
Payer: MEDICAID

## 2024-10-09 ENCOUNTER — TRANSCRIBE ORDERS (OUTPATIENT)
Dept: ADMINISTRATIVE | Facility: HOSPITAL | Age: 29
End: 2024-10-09
Payer: MEDICAID

## 2024-10-09 DIAGNOSIS — G90.50 REFLEX SYMPATHETIC DYSTROPHY: Primary | ICD-10-CM

## 2024-10-09 DIAGNOSIS — G90.50 REFLEX SYMPATHETIC DYSTROPHY: ICD-10-CM

## 2024-10-09 LAB
BASOPHILS # BLD AUTO: 0.04 10*3/MM3 (ref 0–0.2)
BASOPHILS NFR BLD AUTO: 0.4 % (ref 0–1.5)
CRP SERPL-MCNC: <0.3 MG/DL (ref 0–0.5)
DEPRECATED RDW RBC AUTO: 46.8 FL (ref 37–54)
EOSINOPHIL # BLD AUTO: 0.1 10*3/MM3 (ref 0–0.4)
EOSINOPHIL NFR BLD AUTO: 1 % (ref 0.3–6.2)
ERYTHROCYTE [DISTWIDTH] IN BLOOD BY AUTOMATED COUNT: 13.1 % (ref 12.3–15.4)
HCT VFR BLD AUTO: 42.6 % (ref 34–46.6)
HGB BLD-MCNC: 14.2 G/DL (ref 12–15.9)
IMM GRANULOCYTES # BLD AUTO: 0.04 10*3/MM3 (ref 0–0.05)
IMM GRANULOCYTES NFR BLD AUTO: 0.4 % (ref 0–0.5)
LYMPHOCYTES # BLD AUTO: 2.73 10*3/MM3 (ref 0.7–3.1)
LYMPHOCYTES NFR BLD AUTO: 28.4 % (ref 19.6–45.3)
MCH RBC QN AUTO: 32.4 PG (ref 26.6–33)
MCHC RBC AUTO-ENTMCNC: 33.3 G/DL (ref 31.5–35.7)
MCV RBC AUTO: 97.3 FL (ref 79–97)
MONOCYTES # BLD AUTO: 0.63 10*3/MM3 (ref 0.1–0.9)
MONOCYTES NFR BLD AUTO: 6.5 % (ref 5–12)
NEUTROPHILS NFR BLD AUTO: 6.08 10*3/MM3 (ref 1.7–7)
NEUTROPHILS NFR BLD AUTO: 63.3 % (ref 42.7–76)
NRBC BLD AUTO-RTO: 0 /100 WBC (ref 0–0.2)
PLATELET # BLD AUTO: 265 10*3/MM3 (ref 140–450)
PMV BLD AUTO: 10 FL (ref 6–12)
RBC # BLD AUTO: 4.38 10*6/MM3 (ref 3.77–5.28)
URATE SERPL-MCNC: 2.8 MG/DL (ref 2.4–5.7)
WBC NRBC COR # BLD AUTO: 9.62 10*3/MM3 (ref 3.4–10.8)

## 2024-10-09 PROCEDURE — 86200 CCP ANTIBODY: CPT

## 2024-10-09 PROCEDURE — 86140 C-REACTIVE PROTEIN: CPT

## 2024-10-09 PROCEDURE — 87040 BLOOD CULTURE FOR BACTERIA: CPT

## 2024-10-09 PROCEDURE — 86431 RHEUMATOID FACTOR QUANT: CPT

## 2024-10-09 PROCEDURE — 36415 COLL VENOUS BLD VENIPUNCTURE: CPT

## 2024-10-09 PROCEDURE — 84550 ASSAY OF BLOOD/URIC ACID: CPT

## 2024-10-09 PROCEDURE — 85025 COMPLETE CBC W/AUTO DIFF WBC: CPT

## 2024-10-10 LAB
CCP IGA+IGG SERPL IA-ACNC: 7 UNITS (ref 0–19)
RHEUMATOID FACT SERPL-ACNC: <10 IU/ML

## 2024-10-14 LAB — BACTERIA SPEC AEROBE CULT: NORMAL

## 2024-10-17 ENCOUNTER — TELEPHONE (OUTPATIENT)
Dept: OBGYN CLINIC | Age: 29
End: 2024-10-17

## 2024-10-21 ENCOUNTER — PATIENT MESSAGE (OUTPATIENT)
Dept: FAMILY MEDICINE CLINIC | Age: 29
End: 2024-10-21

## 2024-10-21 ENCOUNTER — TELEPHONE (OUTPATIENT)
Dept: PSYCHIATRY | Age: 29
End: 2024-10-21

## 2024-10-21 DIAGNOSIS — F34.9 PERSISTENT MOOD (AFFECTIVE) DISORDER, UNSPECIFIED (HCC): ICD-10-CM

## 2024-10-21 RX ORDER — VILAZODONE HYDROCHLORIDE 20 MG/1
20 TABLET ORAL DAILY
Qty: 30 TABLET | Refills: 0 | Status: SHIPPED | OUTPATIENT
Start: 2024-10-21 | End: 2024-11-20

## 2024-10-21 NOTE — TELEPHONE ENCOUNTER
Pt called stating that she had been dismissed from this office, has appt with a new provider on 10/23/24 but is now out of Viibryd.  Pt is asking if she could get a RX for the Viibryd to cover until the appt above.  Per direction of DEBORAH LAM, pt was informed due to time that has passed since the dismissal (pt signed the certified dismissal on 8/27/24) that she would need to see if her PCP would provide the RX.  Pt verbalized understanding.

## 2024-10-25 ENCOUNTER — PATIENT MESSAGE (OUTPATIENT)
Dept: NEUROSURGERY | Age: 29
End: 2024-10-25

## 2024-11-14 DIAGNOSIS — K21.9 GASTROESOPHAGEAL REFLUX DISEASE WITHOUT ESOPHAGITIS: ICD-10-CM

## 2024-11-14 RX ORDER — OMEPRAZOLE 40 MG/1
40 CAPSULE, DELAYED RELEASE ORAL DAILY
Qty: 30 CAPSULE | Refills: 5 | Status: SHIPPED | OUTPATIENT
Start: 2024-11-14

## 2024-11-22 ENCOUNTER — OFFICE VISIT (OUTPATIENT)
Dept: FAMILY MEDICINE CLINIC | Age: 29
End: 2024-11-22

## 2024-11-22 VITALS
SYSTOLIC BLOOD PRESSURE: 120 MMHG | HEART RATE: 102 BPM | WEIGHT: 111 LBS | TEMPERATURE: 98.1 F | HEIGHT: 61 IN | DIASTOLIC BLOOD PRESSURE: 72 MMHG | OXYGEN SATURATION: 99 % | BODY MASS INDEX: 20.96 KG/M2

## 2024-11-22 DIAGNOSIS — N39.0 RECURRENT UTI: ICD-10-CM

## 2024-11-22 DIAGNOSIS — N39.0 RECURRENT UTI: Primary | ICD-10-CM

## 2024-11-22 DIAGNOSIS — M79.7 FIBROMYALGIA: ICD-10-CM

## 2024-11-22 DIAGNOSIS — Z23 NEED FOR INFLUENZA VACCINATION: ICD-10-CM

## 2024-11-22 LAB
BACTERIA #/AREA URNS HPF: ABNORMAL /HPF
BILIRUB UR QL STRIP: ABNORMAL
CHARACTER UR: ABNORMAL
CLARITY UR: ABNORMAL
COLOR UR: ABNORMAL
GLUCOSE UR STRIP.AUTO-MCNC: NEGATIVE MG/DL
HGB UR STRIP.AUTO-MCNC: NEGATIVE MG/L
KETONES UR STRIP.AUTO-MCNC: 40 MG/DL
LEUKOCYTE ESTERASE UR QL STRIP.AUTO: ABNORMAL
MUCOUS THREADS URNS QL MICRO: ABNORMAL /LPF
NITRITE UR QL STRIP.AUTO: POSITIVE
PH UR STRIP.AUTO: 5 [PH] (ref 5–8)
PROT UR STRIP.AUTO-MCNC: 100 MG/DL
RBC #/AREA URNS HPF: ABNORMAL /HPF (ref 0–2)
SP GR UR STRIP.AUTO: 1.02 (ref 1–1.03)
SQUAMOUS #/AREA URNS HPF: ABNORMAL /HPF
UROBILINOGEN UR STRIP.AUTO-MCNC: 1 E.U./DL
WBC #/AREA URNS HPF: ABNORMAL /HPF (ref 0–5)

## 2024-11-22 RX ORDER — CIPROFLOXACIN 500 MG/1
500 TABLET, FILM COATED ORAL 2 TIMES DAILY
Qty: 10 TABLET | Refills: 0 | Status: SHIPPED | OUTPATIENT
Start: 2024-11-22 | End: 2024-11-27

## 2024-11-22 RX ORDER — TRAMADOL HYDROCHLORIDE 50 MG/1
50 TABLET ORAL EVERY 6 HOURS PRN
Qty: 30 TABLET | Refills: 2 | Status: SHIPPED | OUTPATIENT
Start: 2024-11-22 | End: 2024-12-22

## 2024-11-22 NOTE — PROGRESS NOTES
After obtaining consent, and per orders of Dr. cabezas, injection of Flu vaccine given in Left deltoid by Di Werner MA. Patient instructed to remain in clinic for 20 minutes afterwards, and to report any adverse reaction to me immediately.   
Influenza, FLUCELVAX Trivalent, (age 6 mo+) IM, Preservative Free, 0.5mL           PLAN:    1.  Urine culture.  Cipro.  2.  Tramadol.  3.  Flu shot  Follow-up 3 months

## 2024-11-24 LAB
BACTERIA UR CULT: ABNORMAL
BACTERIA UR CULT: ABNORMAL
ORGANISM: ABNORMAL

## 2024-11-27 DIAGNOSIS — R56.9 CONVULSIONS, UNSPECIFIED CONVULSION TYPE (HCC): ICD-10-CM

## 2024-11-27 DIAGNOSIS — G43.109 MIGRAINE WITH AURA AND WITHOUT STATUS MIGRAINOSUS, NOT INTRACTABLE: ICD-10-CM

## 2024-11-27 NOTE — TELEPHONE ENCOUNTER
Requested Prescriptions     Pending Prescriptions Disp Refills    QULIPTA 60 MG TABS [Pharmacy Med Name: QULIPTA 60 MG TABS 60 Tablet] 30 tablet 3     Sig: Take 1 tablet by mouth daily       Last Office Visit: 5/30/2024  Next Office Visit: Visit date not found  Last Medication Refill: 8/26/2024 with 3 RF

## 2024-11-29 RX ORDER — ATOGEPANT 60 MG/1
1 TABLET ORAL DAILY
Qty: 30 TABLET | Refills: 3 | Status: SHIPPED | OUTPATIENT
Start: 2024-11-29

## 2024-12-05 DIAGNOSIS — R51.9 NONINTRACTABLE HEADACHE, UNSPECIFIED CHRONICITY PATTERN, UNSPECIFIED HEADACHE TYPE: ICD-10-CM

## 2024-12-05 RX ORDER — BUTALBITAL, ASPIRIN, AND CAFFEINE 325; 50; 40 MG/1; MG/1; MG/1
1 CAPSULE ORAL EVERY 4 HOURS PRN
Qty: 15 CAPSULE | Refills: 0 | Status: SHIPPED | OUTPATIENT
Start: 2024-12-05 | End: 2025-01-04

## 2024-12-16 DIAGNOSIS — K21.9 GASTROESOPHAGEAL REFLUX DISEASE WITHOUT ESOPHAGITIS: ICD-10-CM

## 2024-12-16 RX ORDER — OMEPRAZOLE 40 MG/1
40 CAPSULE, DELAYED RELEASE ORAL DAILY
Qty: 30 CAPSULE | Refills: 0 | Status: SHIPPED | OUTPATIENT
Start: 2024-12-16 | End: 2024-12-18 | Stop reason: SDUPTHER

## 2024-12-18 DIAGNOSIS — K21.9 GASTROESOPHAGEAL REFLUX DISEASE WITHOUT ESOPHAGITIS: ICD-10-CM

## 2024-12-19 RX ORDER — OMEPRAZOLE 40 MG/1
40 CAPSULE, DELAYED RELEASE ORAL DAILY
Qty: 30 CAPSULE | Refills: 0 | Status: SHIPPED | OUTPATIENT
Start: 2024-12-19

## 2024-12-25 DIAGNOSIS — G47.00 INSOMNIA, UNSPECIFIED TYPE: ICD-10-CM

## 2024-12-25 DIAGNOSIS — F34.9 PERSISTENT MOOD (AFFECTIVE) DISORDER, UNSPECIFIED: ICD-10-CM

## 2024-12-26 RX ORDER — QUETIAPINE FUMARATE 300 MG/1
300 TABLET, FILM COATED ORAL NIGHTLY
Qty: 30 TABLET | Refills: 0 | OUTPATIENT
Start: 2024-12-26 | End: 2025-01-25

## 2024-12-26 NOTE — TELEPHONE ENCOUNTER
Pharmacy sent a request to refill pt's medication       Last office visit : 5/13/2024   Next office visit : Visit date not found     Requested Prescriptions     Pending Prescriptions Disp Refills    QUEtiapine (SEROQUEL) 300 MG tablet 30 tablet 0     Sig: Take 1 tablet by mouth nightly            Niraj Contreras      5/13/24                                         Progress Note     Sherie Renae 1995                            Chief Complaint   Patient presents with    Medication Check            Subjective:    Patient is a 28 y.o. female diagnosed with persistent mood disorder, anxiety, PTSD, insomnia, BPD, PNES, and presents today for follow-up.  Last seen in clinic on 4/18/24  and prior records were reviewed.     Seen in the office. Increased Seroquel slightly last time due to patient reporting auditory and visual hallucinations intermittently. Denies any hallucinations today. Is not responding to internal stimuli. Reports compliance with medications, denies side effects. Reports increased anxiety due to PCP no longer prescribing Xanax. Calm and cooperative during interview. Will initiate hydroxyzine as needed for anxiety. Patient is going to therapy for with [facility]. Need lithium level for monitoring, order placed last time, reminded patient about compliance, patient receptive. Will follow up in three months.      Absent  suicidal ideation.    Reports compliance with medications as fair .      Sleep: sleeping well     Caffeine use:  tea, coffee, occasional soda, occasional energy drink     PREVIOUS MED TRIALS  Xanax  Zoloft (N/V)  Seroquel  Paxil (SI)  Amitriptyline (SI)  Trazodone (N/V)  Mirtazapine (Nausea)  Prazosin (dizziness)  Pristiq (\"having pseudoseizures\")  Lamictal   Viibryd  Gabapentin  Lithium  Hydroxyzine     SUBSTANCE USE HISTORY  Alcohol: rarely a few times a year  Illicit drug use: denies currently last year was using cocaine, once a week  Marijuana: denies currently , last used

## 2025-01-13 ENCOUNTER — PATIENT MESSAGE (OUTPATIENT)
Age: 30
End: 2025-01-13

## 2025-01-13 DIAGNOSIS — M79.7 FIBROMYALGIA: ICD-10-CM

## 2025-01-13 RX ORDER — TRAMADOL HYDROCHLORIDE 50 MG/1
50 TABLET ORAL EVERY 6 HOURS PRN
Qty: 30 TABLET | Refills: 2 | Status: SHIPPED | OUTPATIENT
Start: 2025-01-13 | End: 2025-02-12

## 2025-01-16 DIAGNOSIS — R51.9 ACUTE NONINTRACTABLE HEADACHE, UNSPECIFIED HEADACHE TYPE: ICD-10-CM

## 2025-01-16 DIAGNOSIS — R56.9 CONVULSIONS, UNSPECIFIED CONVULSION TYPE (HCC): ICD-10-CM

## 2025-01-17 RX ORDER — TOPIRAMATE 50 MG/1
100 TABLET, FILM COATED ORAL DAILY
Qty: 180 TABLET | Refills: 2 | Status: SHIPPED | OUTPATIENT
Start: 2025-01-17 | End: 2025-04-17

## 2025-01-17 NOTE — TELEPHONE ENCOUNTER
Requested Prescriptions     Pending Prescriptions Disp Refills    topiramate (TOPAMAX) 50 MG tablet 60 tablet 5     Sig: TAKE ONE TABLET BY MOUTH EVERY MORNING AND TAKE TWO TABLETS BY MOUTH EVERY EVENING       Last Office Visit: 5/30/2024  Next Office Visit: Visit date not found  Last Medication Refill: 2/27/24 w Trung burkett

## 2025-01-21 ENCOUNTER — TELEPHONE (OUTPATIENT)
Dept: NEUROLOGY | Age: 30
End: 2025-01-21

## 2025-01-21 DIAGNOSIS — F34.9 PERSISTENT MOOD (AFFECTIVE) DISORDER, UNSPECIFIED (HCC): ICD-10-CM

## 2025-01-21 DIAGNOSIS — K21.9 GASTROESOPHAGEAL REFLUX DISEASE WITHOUT ESOPHAGITIS: ICD-10-CM

## 2025-01-21 RX ORDER — VILAZODONE HYDROCHLORIDE 20 MG/1
20 TABLET ORAL DAILY
Qty: 30 TABLET | Refills: 5 | Status: SHIPPED | OUTPATIENT
Start: 2025-01-21 | End: 2025-07-20

## 2025-01-21 RX ORDER — OMEPRAZOLE 40 MG/1
40 CAPSULE, DELAYED RELEASE ORAL DAILY
Qty: 30 CAPSULE | Refills: 5 | Status: SHIPPED | OUTPATIENT
Start: 2025-01-21

## 2025-01-22 NOTE — TELEPHONE ENCOUNTER
Called pt to schedule appt no answer left voice mail to contact office and schedule next available appointment .

## 2025-01-28 ENCOUNTER — TELEPHONE (OUTPATIENT)
Dept: NEUROLOGY | Age: 30
End: 2025-01-28

## 2025-01-28 NOTE — TELEPHONE ENCOUNTER
Patient calling the office back to scheduled a HFU . Please called patient [phone]     Thank you

## 2025-01-28 NOTE — TELEPHONE ENCOUNTER
Attempted to call pt and get her scheduled, phone number that is listed seems like it is not a working number

## 2025-01-31 ENCOUNTER — OFFICE VISIT (OUTPATIENT)
Age: 30
End: 2025-01-31
Payer: MEDICAID

## 2025-01-31 VITALS
TEMPERATURE: 98.5 F | WEIGHT: 113.4 LBS | DIASTOLIC BLOOD PRESSURE: 72 MMHG | SYSTOLIC BLOOD PRESSURE: 114 MMHG | BODY MASS INDEX: 21.41 KG/M2 | HEIGHT: 61 IN | OXYGEN SATURATION: 99 % | HEART RATE: 86 BPM

## 2025-01-31 DIAGNOSIS — F34.9 PERSISTENT MOOD (AFFECTIVE) DISORDER, UNSPECIFIED (HCC): ICD-10-CM

## 2025-01-31 DIAGNOSIS — J06.9 VIRAL URI: Primary | ICD-10-CM

## 2025-01-31 DIAGNOSIS — G47.00 INSOMNIA, UNSPECIFIED TYPE: ICD-10-CM

## 2025-01-31 LAB
INFLUENZA A ANTIGEN, POC: NEGATIVE
INFLUENZA B ANTIGEN, POC: NEGATIVE
LOT EXPIRE DATE: NORMAL
LOT KIT NUMBER: NORMAL
S PYO AG THROAT QL: NORMAL
SARS-COV-2 RNA, POC: NEGATIVE
VALID INTERNAL CONTROL: NORMAL
VENDOR AND KIT NAME POC: NORMAL

## 2025-01-31 PROCEDURE — 99214 OFFICE O/P EST MOD 30 MIN: CPT | Performed by: FAMILY MEDICINE

## 2025-01-31 PROCEDURE — 87880 STREP A ASSAY W/OPTIC: CPT | Performed by: FAMILY MEDICINE

## 2025-01-31 PROCEDURE — 87428 SARSCOV & INF VIR A&B AG IA: CPT | Performed by: FAMILY MEDICINE

## 2025-01-31 RX ORDER — IPRATROPIUM BROMIDE 42 UG/1
2 SPRAY, METERED NASAL 4 TIMES DAILY
Qty: 15 ML | Refills: 3 | Status: SHIPPED | OUTPATIENT
Start: 2025-01-31

## 2025-01-31 RX ORDER — QUETIAPINE FUMARATE 300 MG/1
300 TABLET, FILM COATED ORAL NIGHTLY
Qty: 30 TABLET | Refills: 5 | Status: SHIPPED | OUTPATIENT
Start: 2025-01-31 | End: 2025-07-30

## 2025-01-31 RX ORDER — PREGABALIN 150 MG/1
150 CAPSULE ORAL 2 TIMES DAILY
COMMUNITY
Start: 2025-01-13

## 2025-01-31 SDOH — ECONOMIC STABILITY: FOOD INSECURITY: WITHIN THE PAST 12 MONTHS, YOU WORRIED THAT YOUR FOOD WOULD RUN OUT BEFORE YOU GOT MONEY TO BUY MORE.: NEVER TRUE

## 2025-01-31 SDOH — ECONOMIC STABILITY: FOOD INSECURITY: WITHIN THE PAST 12 MONTHS, THE FOOD YOU BOUGHT JUST DIDN'T LAST AND YOU DIDN'T HAVE MONEY TO GET MORE.: NEVER TRUE

## 2025-01-31 ASSESSMENT — PATIENT HEALTH QUESTIONNAIRE - PHQ9
SUM OF ALL RESPONSES TO PHQ9 QUESTIONS 1 & 2: 0
3. TROUBLE FALLING OR STAYING ASLEEP: NOT AT ALL
9. THOUGHTS THAT YOU WOULD BE BETTER OFF DEAD, OR OF HURTING YOURSELF: NOT AT ALL
SUM OF ALL RESPONSES TO PHQ QUESTIONS 1-9: 0
6. FEELING BAD ABOUT YOURSELF - OR THAT YOU ARE A FAILURE OR HAVE LET YOURSELF OR YOUR FAMILY DOWN: NOT AT ALL
SUM OF ALL RESPONSES TO PHQ QUESTIONS 1-9: 0
2. FEELING DOWN, DEPRESSED OR HOPELESS: NOT AT ALL
5. POOR APPETITE OR OVEREATING: NOT AT ALL
4. FEELING TIRED OR HAVING LITTLE ENERGY: NOT AT ALL
8. MOVING OR SPEAKING SO SLOWLY THAT OTHER PEOPLE COULD HAVE NOTICED. OR THE OPPOSITE, BEING SO FIGETY OR RESTLESS THAT YOU HAVE BEEN MOVING AROUND A LOT MORE THAN USUAL: NOT AT ALL
SUM OF ALL RESPONSES TO PHQ QUESTIONS 1-9: 0
10. IF YOU CHECKED OFF ANY PROBLEMS, HOW DIFFICULT HAVE THESE PROBLEMS MADE IT FOR YOU TO DO YOUR WORK, TAKE CARE OF THINGS AT HOME, OR GET ALONG WITH OTHER PEOPLE: NOT DIFFICULT AT ALL
1. LITTLE INTEREST OR PLEASURE IN DOING THINGS: NOT AT ALL
7. TROUBLE CONCENTRATING ON THINGS, SUCH AS READING THE NEWSPAPER OR WATCHING TELEVISION: NOT AT ALL
SUM OF ALL RESPONSES TO PHQ QUESTIONS 1-9: 0

## 2025-01-31 NOTE — PROGRESS NOTES
SUBJECTIVE:    Patient ID: Sherie Renae is a 29 y.o.female.    HPI:   Patient here for follow-up of multiple medical problems  Patient is a 59-year-old female.  She has a past medical history significant for mood disorder.  Chief complaint of couple of days of sinus congestion, sore throat drainage nausea and abdominal pain.  Headaches.  General malaise.  No cough.  She also have history of mood disorder.  She takes Seroquel and multiple medications.  She has been doing well.  Denies medication side effect.  Seroquel also help her sleep.  She is compliant with therapy.  Request refill of medication         Past Medical History:   Diagnosis Date    Allergic rhinitis     Migraines     PTSD (post-traumatic stress disorder)     Schizoaffective disorder (HCC)       Current Outpatient Medications   Medication Sig Dispense Refill    pregabalin (LYRICA) 150 MG capsule Take 1 capsule by mouth 2 times daily.      QUEtiapine (SEROQUEL) 300 MG tablet Take 1 tablet by mouth nightly 30 tablet 5    VIIBRYD 20 MG Tablet Take 1 tablet by mouth daily 30 tablet 5    omeprazole (PRILOSEC) 40 MG delayed release capsule Take 1 capsule by mouth daily 30 capsule 5    topiramate (TOPAMAX) 50 MG tablet Take 2 tablets by mouth daily Takes at night 180 tablet 2    traMADol (ULTRAM) 50 MG tablet Take 1 tablet by mouth every 6 hours as needed for Pain for up to 30 days. 30 tablet 2    butalbital-aspirin-caffeine (FIORINAL) -40 MG capsule Take 1 capsule by mouth every 4 hours as needed for Headaches for up to 30 days. Max Daily Amount: 6 capsules 15 capsule 0    QULIPTA 60 MG TABS Take 1 tablet by mouth daily 30 tablet 3    ipratropium (ATROVENT) 0.06 % nasal spray 2 sprays by Each Nostril route 4 times daily 15 mL 3    lithium 150 MG capsule Take 1 capsule by mouth 2 times daily (with meals) 60 capsule 2    hydrOXYzine HCl (ATARAX) 25 MG tablet Take 1 tablet by mouth 3 times daily as needed for Anxiety 90 tablet 0    rimegepant sulfate

## 2025-02-10 ENCOUNTER — TELEPHONE (OUTPATIENT)
Dept: NEUROLOGY | Age: 30
End: 2025-02-10

## 2025-02-13 NOTE — TELEPHONE ENCOUNTER
Scheduled next available appt, left v/m with info.also let pt know we need records from hospital visit .

## 2025-03-14 DIAGNOSIS — F34.9 PERSISTENT MOOD (AFFECTIVE) DISORDER, UNSPECIFIED: ICD-10-CM

## 2025-03-14 RX ORDER — LITHIUM CARBONATE 150 MG/1
150 CAPSULE ORAL 2 TIMES DAILY WITH MEALS
Qty: 60 CAPSULE | Refills: 2 | OUTPATIENT
Start: 2025-03-14

## 2025-04-01 ENCOUNTER — OFFICE VISIT (OUTPATIENT)
Dept: NEUROLOGY | Age: 30
End: 2025-04-01
Payer: MEDICAID

## 2025-04-01 VITALS
OXYGEN SATURATION: 97 % | BODY MASS INDEX: 21.34 KG/M2 | HEIGHT: 61 IN | SYSTOLIC BLOOD PRESSURE: 108 MMHG | WEIGHT: 113 LBS | DIASTOLIC BLOOD PRESSURE: 68 MMHG | HEART RATE: 74 BPM

## 2025-04-01 DIAGNOSIS — R56.9 CONVULSIONS, UNSPECIFIED CONVULSION TYPE (HCC): ICD-10-CM

## 2025-04-01 DIAGNOSIS — G93.5 CHIARI I MALFORMATION (HCC): Primary | ICD-10-CM

## 2025-04-01 DIAGNOSIS — R55 SYNCOPE, UNSPECIFIED SYNCOPE TYPE: ICD-10-CM

## 2025-04-01 PROCEDURE — 99214 OFFICE O/P EST MOD 30 MIN: CPT | Performed by: PSYCHIATRY & NEUROLOGY

## 2025-04-01 RX ORDER — TRAMADOL HYDROCHLORIDE 50 MG/1
50 TABLET ORAL PRN
COMMUNITY
Start: 2025-02-21

## 2025-04-01 RX ORDER — LAMOTRIGINE 25 MG/1
100 TABLET ORAL 2 TIMES DAILY
Qty: 240 TABLET | Refills: 5 | Status: SHIPPED | OUTPATIENT
Start: 2025-04-01

## 2025-04-01 NOTE — PROGRESS NOTES
[facility] Office Note      Patient:   Sherie Renae  MR#:    [id number]  Account Number:                         YOB: 1995  Date of Evaluation:  4/1/2025  Time of Note:                          10:31 AM  Primary/Referring Physician:  Valente Le MD   Consulting Physician:  Alonzo Davison,     FOLLOW UP    Chief Complaint   Patient presents with    New Patient     New to provider   Seen with Kristy Alvarado prior     Seizures     Last  seizure was 3-    Results     LTME  9-    Migraine     Migraines are pretty controled        HISTORY OF PRESENT ILLNESS    Sherie Renae is a 29 y.o. year old female here for follow up.  Still noting events.  Video EEG was normal, no events captured.  Events are described as staring, behavioral arrest, possible tonic-clonic activity, with a loss of consciousness. The events are typically brief. They are currently occurring sporadically. She is currently on Topamax and has underlying headache history as well. She has no prior history of traumatic brain injury. She has no family history of seizures. No history of meningitis or encephalitis. She is being admitted to epilepsy monitoring unit for event clarification. Prior MRI with chiari, otherwise nothing acute. MRI C-spine negative for syrinx.  Currently on topamax and lyrica.  PTSD, schizoaffective disorder noted.         Past Medical History:   Diagnosis Date    Allergic rhinitis     Migraines     PTSD (post-traumatic stress disorder)     Schizoaffective disorder (HCC)        Past Surgical History:   Procedure Laterality Date    CHOLECYSTECTOMY  2012    COLONOSCOPY  03/15/2012    Dr Rees    COLONOSCOPY  02/23/2014    Dr Rees-infectious colitis (stool samples sent), cipro/flagyl    COLONOSCOPY N/A 05/18/2022    Dr Mclean, (-) Micro Colitis,  21 year recall    COLPOSCOPY      TONSILLECTOMY      TYMPANOSTOMY TUBE PLACEMENT  age 6    UPPER GASTROINTESTINAL ENDOSCOPY  05/11/2015

## 2025-04-02 ENCOUNTER — CLINICAL DOCUMENTATION (OUTPATIENT)
Dept: NEUROLOGY | Age: 30
End: 2025-04-02

## 2025-04-02 DIAGNOSIS — R56.9 CONVULSIONS, UNSPECIFIED CONVULSION TYPE (HCC): ICD-10-CM

## 2025-04-02 DIAGNOSIS — G43.109 MIGRAINE WITH AURA AND WITHOUT STATUS MIGRAINOSUS, NOT INTRACTABLE: ICD-10-CM

## 2025-04-02 RX ORDER — ATOGEPANT 60 MG/1
1 TABLET ORAL DAILY
Qty: 30 TABLET | Refills: 3 | Status: SHIPPED | OUTPATIENT
Start: 2025-04-02

## 2025-04-02 NOTE — TELEPHONE ENCOUNTER
Sherie Oc has requested a refill on her medication.      Last office visit : 4/1/2025   Next office visit : 7/29/2025   Last medication refill : 11/29/2024 R3         Requested Prescriptions     Pending Prescriptions Disp Refills    QULIPTA 60 MG TABS [Pharmacy Med Name: QULIPTA 60 MG TABS 60 Tablet] 30 tablet 3     Sig: Take 1 tablet by mouth daily

## 2025-04-02 NOTE — PROGRESS NOTES
Approved  PA Detail   Prior authorization approved  Payer: [employer] Patient's Payer Case ID: [id number]    [phone]  Note from payer: The request has been approved. The authorization is effective from 04/02/2025 to 04/02/2026, as long as the member is enrolled in their current health plan. A written notification letter will follow with additional details. - Additional information is available to assist in the completion of the prior authorization. This information is accessible via the PA Detail / Prior Auth Portal link.  Electronic appeal: Not supported  View History     Notes     Time User Attachment    Attachment received from payer. 4/2/2025  3:21 PM Joce, Paulina Outgoing Prescription Prior Authorization Response Document      Medication Being Authorized    QULIPTA 60 MG TABS  Take 1 tablet by mouth daily  Dispense: 30 tablet Refills: 3   Start: 4/2/2025   Class: Normal Diagnoses: Convulsions, unspecified convulsion type (HCC); Migraine with aura and without status migrainosus, not intractable   This order has been released to its destination.  To be filled at: [facility] - [place], [place] - [address] - P [phone] - F [phone]

## 2025-04-08 ENCOUNTER — PATIENT MESSAGE (OUTPATIENT)
Dept: NEUROLOGY | Age: 30
End: 2025-04-08

## 2025-04-11 ENCOUNTER — HOSPITAL ENCOUNTER (OUTPATIENT)
Dept: MRI IMAGING | Age: 30
Discharge: HOME | End: 2025-04-11
Attending: PSYCHIATRY & NEUROLOGY
Payer: MEDICAID

## 2025-04-11 ENCOUNTER — OFFICE VISIT (OUTPATIENT)
Dept: OBGYN CLINIC | Age: 30
End: 2025-04-11

## 2025-04-11 VITALS
SYSTOLIC BLOOD PRESSURE: 115 MMHG | HEART RATE: 83 BPM | HEIGHT: 61 IN | WEIGHT: 114 LBS | DIASTOLIC BLOOD PRESSURE: 78 MMHG | BODY MASS INDEX: 21.52 KG/M2

## 2025-04-11 DIAGNOSIS — N94.10 DYSPAREUNIA IN FEMALE: ICD-10-CM

## 2025-04-11 DIAGNOSIS — G93.5 CHIARI I MALFORMATION (HCC): ICD-10-CM

## 2025-04-11 DIAGNOSIS — N39.0 RECURRENT UTI: ICD-10-CM

## 2025-04-11 DIAGNOSIS — Z01.419 ENCOUNTER FOR WELL WOMAN EXAM WITH ROUTINE GYNECOLOGICAL EXAM: Primary | ICD-10-CM

## 2025-04-11 DIAGNOSIS — Z30.431 INTRAUTERINE DEVICE SURVEILLANCE: ICD-10-CM

## 2025-04-11 DIAGNOSIS — N95.2 VAGINAL ATROPHY: ICD-10-CM

## 2025-04-11 DIAGNOSIS — K30 DELAYED GASTRIC EMPTYING: ICD-10-CM

## 2025-04-11 DIAGNOSIS — R30.0 DYSURIA: ICD-10-CM

## 2025-04-11 DIAGNOSIS — Z12.39 ENCOUNTER FOR SCREENING BREAST EXAMINATION: ICD-10-CM

## 2025-04-11 DIAGNOSIS — Z12.4 SCREENING FOR CERVICAL CANCER: ICD-10-CM

## 2025-04-11 PROCEDURE — A9577 INJ MULTIHANCE: HCPCS | Performed by: PSYCHIATRY & NEUROLOGY

## 2025-04-11 PROCEDURE — 6360000004 HC RX CONTRAST MEDICATION: Performed by: PSYCHIATRY & NEUROLOGY

## 2025-04-11 PROCEDURE — 70553 MRI BRAIN STEM W/O & W/DYE: CPT

## 2025-04-11 RX ORDER — ESTRADIOL 0.1 MG/G
1 CREAM VAGINAL DAILY
Qty: 42.5 G | Refills: 3 | Status: SHIPPED | OUTPATIENT
Start: 2025-04-11

## 2025-04-11 RX ORDER — NITROFURANTOIN 25; 75 MG/1; MG/1
100 CAPSULE ORAL 2 TIMES DAILY
Qty: 14 CAPSULE | Refills: 0 | Status: SHIPPED | OUTPATIENT
Start: 2025-04-11 | End: 2025-04-18

## 2025-04-11 RX ORDER — PHENAZOPYRIDINE HYDROCHLORIDE 100 MG/1
100 TABLET, FILM COATED ORAL 3 TIMES DAILY PRN
Qty: 9 TABLET | Refills: 0 | Status: SHIPPED | OUTPATIENT
Start: 2025-04-11 | End: 2025-04-14

## 2025-04-11 RX ADMIN — GADOBENATE DIMEGLUMINE 10 ML: 529 INJECTION, SOLUTION INTRAVENOUS at 14:36

## 2025-04-11 ASSESSMENT — PATIENT HEALTH QUESTIONNAIRE - PHQ9
1. LITTLE INTEREST OR PLEASURE IN DOING THINGS: NOT AT ALL
SUM OF ALL RESPONSES TO PHQ QUESTIONS 1-9: 1
2. FEELING DOWN, DEPRESSED OR HOPELESS: SEVERAL DAYS

## 2025-04-11 ASSESSMENT — ANXIETY QUESTIONNAIRES
1. FEELING NERVOUS, ANXIOUS, OR ON EDGE: MORE THAN HALF THE DAYS
4. TROUBLE RELAXING: MORE THAN HALF THE DAYS
6. BECOMING EASILY ANNOYED OR IRRITABLE: SEVERAL DAYS
7. FEELING AFRAID AS IF SOMETHING AWFUL MIGHT HAPPEN: NOT AT ALL
2. NOT BEING ABLE TO STOP OR CONTROL WORRYING: NOT AT ALL
5. BEING SO RESTLESS THAT IT IS HARD TO SIT STILL: SEVERAL DAYS
GAD7 TOTAL SCORE: 6
3. WORRYING TOO MUCH ABOUT DIFFERENT THINGS: NOT AT ALL

## 2025-04-11 ASSESSMENT — ENCOUNTER SYMPTOMS
SHORTNESS OF BREATH: 0
RECTAL PAIN: 0
ABDOMINAL PAIN: 0
NAUSEA: 0
CONSTIPATION: 0
BACK PAIN: 0
CHEST TIGHTNESS: 0
DIARRHEA: 0
RESPIRATORY NEGATIVE: 1
GASTROINTESTINAL NEGATIVE: 1

## 2025-04-11 NOTE — PROGRESS NOTES
Pt presents today for pap smear and breast exam. She wants to check for a UTI. She has been taking AZO. C/o burning while urinating. Pt would like to discuss starting estrogen.     Pt is okay with a digital scribe.    Depression screening score: 1  Anxiety screening score: 6  Last mammogram: N/A    Last pap smear: year ago   Contraception: IUD   : 1   Para: 0  AB: 1   Last bone density: N/A   Last colonoscopy:     Sexual Active: Yes

## 2025-04-11 NOTE — PATIENT INSTRUCTIONS
touch.  Unusual thick areas.  Discharge from your nipples if you aren't breastfeeding.  Any changes in the skin of your breasts or nipples, such as puckering or dimpling.  Redness or a change in the skin's usual color.  An unusual increase in the size of one breast.  One breast unusually lower than the other.  Remember that most breast problems or changes are caused by something other than cancer.  Where can you learn more?  Go to https://www.iMedicare.net/patientEd and enter B345 to learn more about \"Learning About Breast Self-Awareness.\"  Current as of: April 30, 2024  Content Version: 14.4  © 0785-5395 [employer]   Care instructions adapted under license by [employer]. If you have questions about a medical condition or this instruction, always ask your healthcare professional. [employer] disclaims any warranty or liability for your use of this information.

## 2025-04-11 NOTE — PROGRESS NOTES
[facility]  CNM Office Note    Sherie Renae is a 29 y.o. female who presents today for her medical conditions/ complaints as noted below.  Chief Complaint   Patient presents with    Annual Exam     Depression screening score: 1  Anxiety screening score: 6  Last mammogram: N/A    Last pap smear: year ago   Contraception: IUD   : 1   Para: 0  AB: 1   Last bone density: N/A   Last colonoscopy:     Sexual Active: Yes    HPI  Sherie Renae presents today for annual exam. She is due for pap smear. She reports history of abnormal pap smears and had colposcopy. She denies family history of breast cancer. She reports her periods are absent with IUD. She reports issues with bladder (recurrent UTI), intercourse can be painful due to poor lubrication, and she has gastroparesis. Her choice of contraception is: IUD. She feels her sleep pattern and quality is Poor. Her GI issues are currently unmanaged. She had a bad experience at the last GI provider and would like to be referred somewhere new.     Problems/Complaints today:  1. Encounter for well woman exam with routine gynecological exam  2. Screening for cervical cancer  -     PAP SMEAR  3. Encounter for screening breast examination  4. Dysuria  -     Culture, Urine; Future  -     nitrofurantoin, macrocrystal-monohydrate, (MACROBID) 100 MG capsule; Take 1 capsule by mouth 2 times daily for 7 days, Disp-14 capsule, R-0Normal  -     phenazopyridine (PYRIDIUM) 100 MG tablet; Take 1 tablet by mouth 3 times daily as needed for Pain, Disp-9 tablet, R-0Normal  5. Vaginal atrophy  -     estradiol (ESTRACE VAGINAL) 0.1 MG/GM vaginal cream; Place 1 g vaginally daily X 2 weeks then twice weekly, Disp-42.5 g, R-3Normal  6. Dyspareunia in female  -     estradiol (ESTRACE VAGINAL) 0.1 MG/GM vaginal cream; Place 1 g vaginally daily X 2 weeks then twice weekly, Disp-42.5 g, R-3Normal  7. Recurrent UTI  -     Culture, Urine; Future  -     estradiol (ESTRACE

## 2025-04-13 LAB — BACTERIA UR CULT: NORMAL

## 2025-04-14 ENCOUNTER — RESULTS FOLLOW-UP (OUTPATIENT)
Dept: OBGYN CLINIC | Age: 30
End: 2025-04-14

## 2025-04-14 ENCOUNTER — PATIENT MESSAGE (OUTPATIENT)
Age: 30
End: 2025-04-14

## 2025-04-14 ENCOUNTER — TELEPHONE (OUTPATIENT)
Dept: OBGYN CLINIC | Age: 30
End: 2025-04-14

## 2025-04-14 DIAGNOSIS — K58.0 IRRITABLE BOWEL SYNDROME WITH DIARRHEA: Primary | ICD-10-CM

## 2025-04-14 DIAGNOSIS — K30 DELAYED GASTRIC EMPTYING: ICD-10-CM

## 2025-04-14 DIAGNOSIS — M79.7 FIBROMYALGIA: Primary | ICD-10-CM

## 2025-04-14 RX ORDER — TRAMADOL HYDROCHLORIDE 50 MG/1
50 TABLET ORAL EVERY 6 HOURS PRN
Qty: 30 TABLET | Refills: 2 | Status: SHIPPED | OUTPATIENT
Start: 2025-04-14 | End: 2025-07-13

## 2025-04-14 NOTE — TELEPHONE ENCOUNTER
----- Message from GENARO Coppola CNP sent at 4/11/2025  3:59 PM CDT -----  Please refer to [facility] for delayed gastric emptying. Had a bad experience here and would like to start fresh there. Thank you

## 2025-04-15 LAB
HPV HR 12 DNA SPEC QL NAA+PROBE: NOT DETECTED
HPV16 DNA SPEC QL NAA+PROBE: NOT DETECTED
HPV16+18+H RISK 12 DNA SPEC-IMP: NORMAL
HPV18 DNA SPEC QL NAA+PROBE: NOT DETECTED

## 2025-04-16 ENCOUNTER — TELEPHONE (OUTPATIENT)
Dept: NEUROLOGY | Age: 30
End: 2025-04-16

## 2025-04-16 NOTE — TELEPHONE ENCOUNTER
Sherie called and stated that she is not currently on any medication for her seizures and she had been having multiple seizures for the past 2 weeks about every other day. Patient wanted to see if there is something new she can take until she sees you in May or what she should do.   If a new medication can be started she would like it sent to [facility].

## 2025-04-17 NOTE — TELEPHONE ENCOUNTER
Called and spoke with patient and she will keep her May appointment and if she still has multiple seizures a day then she will go to the ER.

## 2025-04-18 ENCOUNTER — TELEPHONE (OUTPATIENT)
Dept: NEUROLOGY | Age: 30
End: 2025-04-18

## 2025-04-18 NOTE — TELEPHONE ENCOUNTER
FAWAD ROMANO (Key: [id number])  PA Case ID #: [id number]  Need Help? Call us at [phone]  Outcome  Approved on April 16 by [place] Medicaid MedImpact 2017  The request has been approved. The authorization is effective from 04/16/2025 to 07/16/2025, as long as the member is enrolled in their current health plan. A written notification letter will follow with additional details.  Effective Date: 4/16/2025  Authorization Expiration Date: 7/16/2025  Drug  Nurtec 75MG dispersible tablets    Form  MedImpact [place] Medicaid ePA Form 2017 NCPDP

## 2025-05-05 DIAGNOSIS — F34.9 PERSISTENT MOOD (AFFECTIVE) DISORDER, UNSPECIFIED: ICD-10-CM

## 2025-05-05 DIAGNOSIS — R56.9 CONVULSIONS, UNSPECIFIED CONVULSION TYPE (HCC): ICD-10-CM

## 2025-05-05 DIAGNOSIS — G43.109 MIGRAINE WITH AURA AND WITHOUT STATUS MIGRAINOSUS, NOT INTRACTABLE: ICD-10-CM

## 2025-05-06 RX ORDER — ATOGEPANT 60 MG/1
1 TABLET ORAL DAILY
Qty: 30 TABLET | Refills: 3 | Status: SHIPPED | OUTPATIENT
Start: 2025-05-06

## 2025-05-06 NOTE — TELEPHONE ENCOUNTER
Sherie Oc has requested a refill on her medication.      Last office visit : 4/1/2025   Next office visit : 5/13/2025   Last medication refill : 04/02/2025 R3      Requested Prescriptions     Pending Prescriptions Disp Refills    Atogepant (QULIPTA) 60 MG TABS 30 tablet 3     Sig: Take 1 tablet by mouth daily

## 2025-05-07 RX ORDER — LITHIUM CARBONATE 150 MG/1
150 CAPSULE ORAL 2 TIMES DAILY WITH MEALS
Qty: 60 CAPSULE | Refills: 2 | OUTPATIENT
Start: 2025-05-07

## 2025-05-13 ENCOUNTER — OFFICE VISIT (OUTPATIENT)
Dept: NEUROLOGY | Age: 30
End: 2025-05-13
Payer: MEDICAID

## 2025-05-13 VITALS
SYSTOLIC BLOOD PRESSURE: 110 MMHG | DIASTOLIC BLOOD PRESSURE: 62 MMHG | OXYGEN SATURATION: 97 % | HEART RATE: 74 BPM | BODY MASS INDEX: 21.52 KG/M2 | WEIGHT: 114 LBS | HEIGHT: 61 IN

## 2025-05-13 DIAGNOSIS — R56.9 CONVULSIONS, UNSPECIFIED CONVULSION TYPE (HCC): ICD-10-CM

## 2025-05-13 DIAGNOSIS — F34.9 PERSISTENT MOOD (AFFECTIVE) DISORDER, UNSPECIFIED: ICD-10-CM

## 2025-05-13 DIAGNOSIS — G93.5 CHIARI I MALFORMATION (HCC): Primary | ICD-10-CM

## 2025-05-13 PROCEDURE — 99214 OFFICE O/P EST MOD 30 MIN: CPT | Performed by: PSYCHIATRY & NEUROLOGY

## 2025-05-13 RX ORDER — LITHIUM CARBONATE 150 MG/1
150 CAPSULE ORAL 2 TIMES DAILY WITH MEALS
Qty: 60 CAPSULE | Refills: 2 | OUTPATIENT
Start: 2025-05-13

## 2025-05-13 RX ORDER — LEVETIRACETAM 500 MG/1
500 TABLET ORAL 2 TIMES DAILY
Qty: 60 TABLET | Refills: 5 | Status: SHIPPED | OUTPATIENT
Start: 2025-05-13

## 2025-05-13 RX ORDER — PHENAZOPYRIDINE HYDROCHLORIDE 100 MG/1
100 TABLET, FILM COATED ORAL 3 TIMES DAILY PRN
COMMUNITY
End: 2025-05-13 | Stop reason: ALTCHOICE

## 2025-05-13 NOTE — PROGRESS NOTES
[facility] Office Note      Patient:   Sherie Renae  MR#:    [id number]  Account Number:                         YOB: 1995  Date of Evaluation:  5/13/2025  Time of Note:                          11:41 AM  Primary/Referring Physician:  Valente Le MD   Consulting Physician:  Alonzo Davison,     FOLLOW UP    Chief Complaint   Patient presents with    Follow-up     Chiari I malformation    Seizures     Last seizure was 5- this has been going on for the past 3 days     Migraine    Results     Results from recent MRI    Discuss Medications       HISTORY OF PRESENT ILLNESS    Sherie Renae is a 29 y.o. year old female here for follow up.  Still noting events since last seen.  Video EEG was normal, no events captured.  Events are described as staring, behavioral arrest, possible tonic-clonic activity, with a loss of consciousness. The events are typically brief. They are currently occurring sporadically. Tried Lamictal but unable to tolerate.  Off Topamax, was taking for headaches. She has no prior history of traumatic brain injury. She has no family history of seizures. No history of meningitis or encephalitis. She is being admitted to epilepsy monitoring unit for event clarification. Prior MRI with chiari, otherwise nothing acute. MRI C-spine negative for syrinx.  Off lyrica.  PTSD, schizoaffective disorder noted.         Past Medical History:   Diagnosis Date    Allergic rhinitis     Migraines     PTSD (post-traumatic stress disorder)     Schizoaffective disorder (HCC)        Past Surgical History:   Procedure Laterality Date    CHOLECYSTECTOMY  2012    COLONOSCOPY  03/15/2012    Dr Rees    COLONOSCOPY  02/23/2014    Dr Rees-infectious colitis (stool samples sent), cipro/flagyl    COLONOSCOPY N/A 05/18/2022    Dr Mclean, (-) Micro Colitis,  21 year recall    COLPOSCOPY      TONSILLECTOMY      TYMPANOSTOMY TUBE PLACEMENT  age 6    UPPER GASTROINTESTINAL ENDOSCOPY

## 2025-05-18 ENCOUNTER — PATIENT MESSAGE (OUTPATIENT)
Dept: NEUROLOGY | Age: 30
End: 2025-05-18

## 2025-06-10 NOTE — PROGRESS NOTES
Pt presents today via virtual visit for a 2 month f/u on vaginal estrogen. Pt states it has been going well and has helped.

## 2025-06-11 ENCOUNTER — TELEMEDICINE (OUTPATIENT)
Dept: OBGYN CLINIC | Age: 30
End: 2025-06-11
Payer: MEDICAID

## 2025-06-11 DIAGNOSIS — N94.10 DYSPAREUNIA IN FEMALE: ICD-10-CM

## 2025-06-11 DIAGNOSIS — N95.2 VAGINAL ATROPHY: Primary | ICD-10-CM

## 2025-06-11 DIAGNOSIS — N39.0 RECURRENT UTI: ICD-10-CM

## 2025-06-11 PROCEDURE — 99214 OFFICE O/P EST MOD 30 MIN: CPT

## 2025-06-11 NOTE — PROGRESS NOTES
[facility]  CN Office Note  TELEHEALTH EVALUATION -- Audio/Visual (During COVID-19 public health emergency)    Sherie Renae is a 29 y.o. female who presents today for her medical conditions/ complaints as noted below.  Chief Complaint   Patient presents with    Follow-up     HPI  History of Present Illness  The patient presents via virtual visit for follow-up on vaginal estrogen therapy and a referral to a rheumatologist.    She reports a positive response to the vaginal estrogen therapy, with a notable reduction in urinary tract infections (UTIs) and yeast infections. Since the initiation of this treatment, she has not experienced any UTIs. The therapy has also alleviated her dyspareunia, although she continues to experience intermittent abdominal discomfort. Previously, she would experience post-coital bleeding even with lubrication, but this symptom has not recurred since starting the therapy. She has not experienced any adverse reactions such as rashes. Her insurance covers the cost of the medication, and she has an adequate supply at home. She is currently using the medication twice weekly.    She is seeking a referral to a rheumatologist. She was previously diagnosed with an autoimmune condition while residing in [place], but has been unable to secure a specialist since relocating. She has identified a potential provider in [place], [place] who accepts Medicaid. The previous rheumatologist in [place] was considering a diagnosis of lupus.     Patient Active Problem List   Diagnosis    Allergic rhinitis    IBS (irritable bowel syndrome)    Gastroesophageal reflux disease without esophagitis    Panic attack    Near syncope    Tachycardia    Chest pain    Generalized abdominal cramping    Chronic epigastric pain    Chronic vomiting    Chronic nausea    Chronic diarrhea    Seizure (HCC)       No LMP recorded. (Menstrual status: IUD).      Past Medical History:   Diagnosis Date

## 2025-06-18 DIAGNOSIS — M35.9 AUTOIMMUNE DISEASE: Primary | ICD-10-CM

## 2025-06-18 ASSESSMENT — ENCOUNTER SYMPTOMS
RECTAL PAIN: 0
GASTROINTESTINAL NEGATIVE: 1
SHORTNESS OF BREATH: 0
NAUSEA: 0
DIARRHEA: 0
CONSTIPATION: 0
RESPIRATORY NEGATIVE: 1
CHEST TIGHTNESS: 0
BACK PAIN: 0
ABDOMINAL PAIN: 0

## 2025-06-20 DIAGNOSIS — K21.9 GASTROESOPHAGEAL REFLUX DISEASE WITHOUT ESOPHAGITIS: ICD-10-CM

## 2025-06-20 DIAGNOSIS — M79.7 FIBROMYALGIA: ICD-10-CM

## 2025-06-20 RX ORDER — TRAMADOL HYDROCHLORIDE 50 MG/1
50 TABLET ORAL EVERY 8 HOURS PRN
Qty: 30 TABLET | Refills: 2 | Status: SHIPPED | OUTPATIENT
Start: 2025-06-20 | End: 2025-07-20

## 2025-06-20 RX ORDER — OMEPRAZOLE 40 MG/1
40 CAPSULE, DELAYED RELEASE ORAL DAILY
Qty: 30 CAPSULE | Refills: 5 | Status: SHIPPED | OUTPATIENT
Start: 2025-06-20

## 2025-06-21 ENCOUNTER — HOSPITAL ENCOUNTER (EMERGENCY)
Age: 30
Discharge: HOME | End: 2025-06-21
Attending: PEDIATRICS
Payer: MEDICAID

## 2025-06-21 VITALS
RESPIRATION RATE: 17 BRPM | SYSTOLIC BLOOD PRESSURE: 102 MMHG | OXYGEN SATURATION: 99 % | DIASTOLIC BLOOD PRESSURE: 69 MMHG | TEMPERATURE: 98.1 F | HEART RATE: 95 BPM

## 2025-06-21 DIAGNOSIS — R56.9 SEIZURE (HCC): Primary | ICD-10-CM

## 2025-06-21 LAB
ALBUMIN SERPL-MCNC: 4.8 G/DL (ref 3.5–5.2)
ALP SERPL-CCNC: 76 U/L (ref 35–104)
ALT SERPL-CCNC: 6 U/L (ref 10–35)
ANION GAP SERPL CALCULATED.3IONS-SCNC: 19 MMOL/L (ref 8–16)
AST SERPL-CCNC: 30 U/L (ref 10–35)
BASOPHILS # BLD: 0.1 K/UL (ref 0–0.2)
BASOPHILS NFR BLD: 0.5 % (ref 0–1)
BILIRUB SERPL-MCNC: 0.9 MG/DL (ref 0.2–1.2)
BILIRUB UR QL STRIP: NEGATIVE
BUN SERPL-MCNC: 6 MG/DL (ref 6–20)
CALCIUM SERPL-MCNC: 9.8 MG/DL (ref 8.6–10)
CHLORIDE SERPL-SCNC: 98 MMOL/L (ref 98–107)
CLARITY UR: ABNORMAL
CO2 SERPL-SCNC: 19 MMOL/L (ref 22–29)
COLOR UR: YELLOW
CREAT SERPL-MCNC: 0.6 MG/DL (ref 0.5–0.9)
EOSINOPHIL # BLD: 0 K/UL (ref 0–0.6)
EOSINOPHIL NFR BLD: 0.1 % (ref 0–5)
ERYTHROCYTE [DISTWIDTH] IN BLOOD BY AUTOMATED COUNT: 12.1 % (ref 11.5–14.5)
GLUCOSE SERPL-MCNC: 98 MG/DL (ref 70–99)
GLUCOSE UR STRIP.AUTO-MCNC: NEGATIVE MG/DL
HCG SERPL QL: NEGATIVE
HCT VFR BLD AUTO: 42.7 % (ref 37–47)
HGB BLD-MCNC: 14.7 G/DL (ref 12–16)
HGB UR STRIP.AUTO-MCNC: NEGATIVE MG/L
IMM GRANULOCYTES # BLD: 0 K/UL
KETONES UR STRIP.AUTO-MCNC: =>160 MG/DL
LEUKOCYTE ESTERASE UR QL STRIP.AUTO: NEGATIVE
LYMPHOCYTES # BLD: 2.2 K/UL (ref 1.1–4.5)
LYMPHOCYTES NFR BLD: 21.8 % (ref 20–40)
MCH RBC QN AUTO: 32.2 PG (ref 27–31)
MCHC RBC AUTO-ENTMCNC: 34.4 G/DL (ref 33–37)
MCV RBC AUTO: 93.4 FL (ref 81–99)
MONOCYTES # BLD: 0.8 K/UL (ref 0–0.9)
MONOCYTES NFR BLD: 7.8 % (ref 0–10)
NEUTROPHILS # BLD: 6.9 K/UL (ref 1.5–7.5)
NEUTS SEG NFR BLD: 69.5 % (ref 50–65)
NITRITE UR QL STRIP.AUTO: NEGATIVE
PH UR STRIP.AUTO: 5.5 [PH] (ref 5–8)
PLATELET # BLD AUTO: 299 K/UL (ref 130–400)
PMV BLD AUTO: 10.2 FL (ref 9.4–12.3)
POTASSIUM SERPL-SCNC: 4.5 MMOL/L (ref 3.5–5.1)
PROT SERPL-MCNC: 7.9 G/DL (ref 6.4–8.3)
PROT UR STRIP.AUTO-MCNC: ABNORMAL MG/DL
RBC # BLD AUTO: 4.57 M/UL (ref 4.2–5.4)
SODIUM SERPL-SCNC: 136 MMOL/L (ref 136–145)
SP GR UR STRIP.AUTO: 1.02 (ref 1–1.03)
UROBILINOGEN UR STRIP.AUTO-MCNC: 1 E.U./DL
WBC # BLD AUTO: 10 K/UL (ref 4.8–10.8)

## 2025-06-21 PROCEDURE — 96374 THER/PROPH/DIAG INJ IV PUSH: CPT

## 2025-06-21 PROCEDURE — 84703 CHORIONIC GONADOTROPIN ASSAY: CPT

## 2025-06-21 PROCEDURE — 81003 URINALYSIS AUTO W/O SCOPE: CPT

## 2025-06-21 PROCEDURE — 85025 COMPLETE CBC W/AUTO DIFF WBC: CPT

## 2025-06-21 PROCEDURE — 6360000002 HC RX W HCPCS: Performed by: PEDIATRICS

## 2025-06-21 PROCEDURE — 36415 COLL VENOUS BLD VENIPUNCTURE: CPT

## 2025-06-21 PROCEDURE — 99284 EMERGENCY DEPT VISIT MOD MDM: CPT

## 2025-06-21 PROCEDURE — 93005 ELECTROCARDIOGRAM TRACING: CPT | Performed by: PEDIATRICS

## 2025-06-21 PROCEDURE — 80053 COMPREHEN METABOLIC PANEL: CPT

## 2025-06-21 RX ORDER — LEVETIRACETAM 500 MG/5ML
500 INJECTION, SOLUTION, CONCENTRATE INTRAVENOUS ONCE
Status: COMPLETED | OUTPATIENT
Start: 2025-06-21 | End: 2025-06-21

## 2025-06-21 RX ADMIN — LEVETIRACETAM 500 MG: 100 INJECTION INTRAVENOUS at 01:24

## 2025-06-21 NOTE — DISCHARGE INSTRUCTIONS
Return or seek medical attention with persistent or prolonged seizure activity, difficulty breathing, or other concerns  Take all medications as directed by your doctor

## 2025-06-21 NOTE — ED NOTES
Pt back to room from bathroom. Pt sitting in chair in room at this time, asks that she be allowed to stay in chair for a bit.

## 2025-06-22 LAB
EKG P AXIS: 70 DEGREES
EKG P-R INTERVAL: 138 MS
EKG Q-T INTERVAL: 360 MS
EKG QRS DURATION: 82 MS
EKG QTC CALCULATION (BAZETT): 398 MS
EKG T AXIS: 14 DEGREES

## 2025-06-22 NOTE — ED PROVIDER NOTES
[facility] EMERGENCY DEPARTMENT  eMERGENCY dEPARTMENT eNCOUnter      Pt Name: Sherie Renae  MRN: [id number]  Birthdate 1995  Date of evaluation: 6/21/2025  Provider: Naheed Mota MD    CHIEF COMPLAINT       Chief Complaint   Patient presents with    Seizures         HISTORY OF PRESENT ILLNESS   (Location/Symptom, Timing/Onset,Context/Setting, Quality, Duration, Modifying Factors, Severity)  Note limiting factors.   Sherie Renae is a 29 y.o. female who presents to the emergency department ***     HPI    NursingNotes were reviewed.    REVIEW OF SYSTEMS    (2-9 systems for level 4, 10 or more for level 5)     Review of Systems         PAST MEDICALHISTORY     Past Medical History:   Diagnosis Date    Allergic rhinitis     Migraines     PTSD (post-traumatic stress disorder)     Schizoaffective disorder (HCC)          SURGICAL HISTORY       Past Surgical History:   Procedure Laterality Date    CHOLECYSTECTOMY  2012    COLONOSCOPY  03/15/2012    Dr Rees    COLONOSCOPY  02/23/2014    Dr Rees-infectious colitis (stool samples sent), cipro/flagyl    COLONOSCOPY N/A 05/18/2022    Dr Mclean, (-) Micro Colitis,  21 year recall    COLPOSCOPY      TONSILLECTOMY      TYMPANOSTOMY TUBE PLACEMENT  age 6    UPPER GASTROINTESTINAL ENDOSCOPY  05/11/2015    Dr Rees reflux    UPPER GASTROINTESTINAL ENDOSCOPY  03/15/2012    Dr Rees, normal,urea (-)    UPPER GASTROINTESTINAL ENDOSCOPY N/A 05/18/2022    Dr Mclean, (-)Sprue         CURRENT MEDICATIONS     Discharge Medication List as of 6/21/2025  5:40 AM        CONTINUE these medications which have NOT CHANGED    Details   traMADol (ULTRAM) 50 MG tablet Take 1 tablet by mouth every 8 hours as needed for Pain for up to 30 days. Max Daily Amount: 150 mg, Disp-30 tablet, R-2Normal      omeprazole (PRILOSEC) 40 MG delayed release capsule Take 1 capsule by mouth daily, Disp-30 capsule, R-5Normal      levETIRAcetam (KEPPRA) 500 MG tablet Take 1 tablet by

## 2025-06-23 ENCOUNTER — OFFICE VISIT (OUTPATIENT)
Age: 30
End: 2025-06-23
Payer: MEDICAID

## 2025-06-23 ENCOUNTER — PATIENT MESSAGE (OUTPATIENT)
Dept: NEUROLOGY | Age: 30
End: 2025-06-23

## 2025-06-23 VITALS
DIASTOLIC BLOOD PRESSURE: 56 MMHG | WEIGHT: 108 LBS | BODY MASS INDEX: 20.39 KG/M2 | SYSTOLIC BLOOD PRESSURE: 120 MMHG | OXYGEN SATURATION: 98 % | TEMPERATURE: 97.5 F | HEIGHT: 61 IN | HEART RATE: 96 BPM

## 2025-06-23 DIAGNOSIS — R56.9 SEIZURE (HCC): Primary | ICD-10-CM

## 2025-06-23 DIAGNOSIS — K30 DELAYED GASTRIC EMPTYING: ICD-10-CM

## 2025-06-23 PROCEDURE — 99214 OFFICE O/P EST MOD 30 MIN: CPT | Performed by: FAMILY MEDICINE

## 2025-06-23 RX ORDER — METOCLOPRAMIDE 10 MG/1
10 TABLET ORAL 4 TIMES DAILY
Qty: 120 TABLET | Refills: 5 | Status: SHIPPED | OUTPATIENT
Start: 2025-06-23

## 2025-06-23 RX ORDER — SERTRALINE HYDROCHLORIDE 100 MG/1
100 TABLET, FILM COATED ORAL DAILY
COMMUNITY

## 2025-06-23 ASSESSMENT — ENCOUNTER SYMPTOMS
NAUSEA: 1
EYES NEGATIVE: 1
RESPIRATORY NEGATIVE: 1
ALLERGIC/IMMUNOLOGIC NEGATIVE: 1

## 2025-06-23 NOTE — PROGRESS NOTES
SUBJECTIVE:    Patient ID: Sherie Renae is a 29 y.o.female.    HPI:   Patient here for evaluation of multiple medical problems  Patient is a 29-year-old female.  She was seen in the emergency room secondary to seizures.  She have a history of seizures and seeing neurology.  In the ER they increased her Keppra from once a day to twice a day.  She also have gastroparesis.  She be placed on Reglan long time ago.  She stopped taking the medication.  She has been having more nausea frequently recently.  She has not been seeing GI.         Past Medical History:   Diagnosis Date    Allergic rhinitis     Migraines     PTSD (post-traumatic stress disorder)     Schizoaffective disorder (HCC)       Current Outpatient Medications   Medication Sig Dispense Refill    sertraline (ZOLOFT) 100 MG tablet Take 1 tablet by mouth daily      metoclopramide (REGLAN) 10 MG tablet Take 1 tablet by mouth 4 times daily 120 tablet 5    traMADol (ULTRAM) 50 MG tablet Take 1 tablet by mouth every 8 hours as needed for Pain for up to 30 days. Max Daily Amount: 150 mg 30 tablet 2    omeprazole (PRILOSEC) 40 MG delayed release capsule Take 1 capsule by mouth daily 30 capsule 5    levETIRAcetam (KEPPRA) 500 MG tablet Take 1 tablet by mouth 2 times daily 60 tablet 5    Atogepant (QULIPTA) 60 MG TABS Take 1 tablet by mouth daily 30 tablet 3    estradiol (ESTRACE VAGINAL) 0.1 MG/GM vaginal cream Place 1 g vaginally daily X 2 weeks then twice weekly 42.5 g 3    QUEtiapine (SEROQUEL) 300 MG tablet Take 1 tablet by mouth nightly 30 tablet 5    ipratropium (ATROVENT) 0.06 % nasal spray 2 sprays by Each Nostril route 4 times daily 15 mL 3    lithium 150 MG capsule Take 1 capsule by mouth 2 times daily (with meals) 60 capsule 2    hydrOXYzine HCl (ATARAX) 25 MG tablet Take 1 tablet by mouth 3 times daily as needed for Anxiety 90 tablet 0    rimegepant sulfate (NURTEC) 75 MG TBDP Take 1 tablet at the onset of migraine. Do not exceed 1 tablet in 24 hours. 8

## 2025-06-30 ENCOUNTER — OFFICE VISIT (OUTPATIENT)
Age: 30
End: 2025-06-30
Payer: MEDICAID

## 2025-06-30 VITALS
WEIGHT: 112 LBS | HEIGHT: 61 IN | TEMPERATURE: 98.3 F | SYSTOLIC BLOOD PRESSURE: 118 MMHG | OXYGEN SATURATION: 99 % | BODY MASS INDEX: 21.14 KG/M2 | DIASTOLIC BLOOD PRESSURE: 64 MMHG | HEART RATE: 120 BPM

## 2025-06-30 DIAGNOSIS — B96.89 ACUTE BACTERIAL SINUSITIS: Primary | ICD-10-CM

## 2025-06-30 DIAGNOSIS — F34.9 PERSISTENT MOOD (AFFECTIVE) DISORDER, UNSPECIFIED: ICD-10-CM

## 2025-06-30 DIAGNOSIS — G47.00 INSOMNIA, UNSPECIFIED TYPE: ICD-10-CM

## 2025-06-30 DIAGNOSIS — J01.90 ACUTE BACTERIAL SINUSITIS: Primary | ICD-10-CM

## 2025-06-30 DIAGNOSIS — K30 DELAYED GASTRIC EMPTYING: ICD-10-CM

## 2025-06-30 PROCEDURE — 99214 OFFICE O/P EST MOD 30 MIN: CPT | Performed by: FAMILY MEDICINE

## 2025-06-30 RX ORDER — CEPHALEXIN 500 MG/1
500 CAPSULE ORAL 3 TIMES DAILY
Qty: 30 CAPSULE | Refills: 0 | Status: SHIPPED | OUTPATIENT
Start: 2025-06-30

## 2025-06-30 RX ORDER — QUETIAPINE FUMARATE 300 MG/1
300 TABLET, FILM COATED ORAL NIGHTLY
Qty: 30 TABLET | Refills: 5 | Status: SHIPPED | OUTPATIENT
Start: 2025-06-30 | End: 2025-12-27

## 2025-06-30 RX ORDER — FLUTICASONE PROPIONATE 50 MCG
2 SPRAY, SUSPENSION (ML) NASAL DAILY
Qty: 16 G | Refills: 3 | Status: SHIPPED | OUTPATIENT
Start: 2025-06-30

## 2025-06-30 ASSESSMENT — ENCOUNTER SYMPTOMS
EYES NEGATIVE: 1
ALLERGIC/IMMUNOLOGIC NEGATIVE: 1
GASTROINTESTINAL NEGATIVE: 1
NAUSEA: 1
DIARRHEA: 1
SINUS PAIN: 1
RESPIRATORY NEGATIVE: 1

## 2025-06-30 NOTE — PROGRESS NOTES
SUBJECTIVE:    Patient ID: Sherie Renae is a 29 y.o.female.    HPI:   Patient here for acute problem  Patient is 29-year-old female.  She complained of sinus pressure and congestion.  She has been going on for about a week now.  Complaint of tooth pain from the congestion.  Postnasal drainage.  Just do not feel well.  Last time she was here she was complaining of nausea.  She was placed on Reglan secondary to gastroparesis.  Medication have been very helpful.         Past Medical History:   Diagnosis Date    Allergic rhinitis     Migraines     PTSD (post-traumatic stress disorder)     Schizoaffective disorder (HCC)       Current Outpatient Medications   Medication Sig Dispense Refill    QUEtiapine (SEROQUEL) 300 MG tablet Take 1 tablet by mouth nightly 30 tablet 5    cephALEXin (KEFLEX) 500 MG capsule Take 1 capsule by mouth 3 times daily 30 capsule 0    fluticasone (FLONASE) 50 MCG/ACT nasal spray 2 sprays by Each Nostril route daily 16 g 3    sertraline (ZOLOFT) 100 MG tablet Take 1 tablet by mouth daily      metoclopramide (REGLAN) 10 MG tablet Take 1 tablet by mouth 4 times daily 120 tablet 5    traMADol (ULTRAM) 50 MG tablet Take 1 tablet by mouth every 8 hours as needed for Pain for up to 30 days. Max Daily Amount: 150 mg 30 tablet 2    omeprazole (PRILOSEC) 40 MG delayed release capsule Take 1 capsule by mouth daily 30 capsule 5    levETIRAcetam (KEPPRA) 500 MG tablet Take 1 tablet by mouth 2 times daily 60 tablet 5    Atogepant (QULIPTA) 60 MG TABS Take 1 tablet by mouth daily 30 tablet 3    estradiol (ESTRACE VAGINAL) 0.1 MG/GM vaginal cream Place 1 g vaginally daily X 2 weeks then twice weekly 42.5 g 3    ipratropium (ATROVENT) 0.06 % nasal spray 2 sprays by Each Nostril route 4 times daily 15 mL 3    lithium 150 MG capsule Take 1 capsule by mouth 2 times daily (with meals) 60 capsule 2    hydrOXYzine HCl (ATARAX) 25 MG tablet Take 1 tablet by mouth 3 times daily as needed for Anxiety 90 tablet 0

## 2025-07-09 ENCOUNTER — TELEPHONE (OUTPATIENT)
Dept: NEUROLOGY | Age: 30
End: 2025-07-09

## 2025-07-09 NOTE — TELEPHONE ENCOUNTER
FAWAD ROMANO (Key: [id number])  PA Case ID #: [id number]  Need Help? Call us at [phone]  Outcome  Approved today by [place] [employer] 2017  The request has been approved. The authorization is effective from 07/09/2025 to 07/09/2026, as long as the member is enrolled in their current health plan. A written notification letter will follow with additional details.  Effective Date: 7/9/2025  Authorization Expiration Date: 7/9/2026  Drug  Nurtec 75MG dispersible tablets    Form  [employer] [place] Medicaid ePA Form 2017 NCP[place]

## 2025-07-24 DIAGNOSIS — K21.9 GASTROESOPHAGEAL REFLUX DISEASE WITHOUT ESOPHAGITIS: ICD-10-CM

## 2025-07-24 RX ORDER — OMEPRAZOLE 40 MG/1
40 CAPSULE, DELAYED RELEASE ORAL DAILY
Qty: 30 CAPSULE | Refills: 5 | Status: SHIPPED | OUTPATIENT
Start: 2025-07-24

## 2025-07-31 DIAGNOSIS — R56.9 CONVULSIONS, UNSPECIFIED CONVULSION TYPE (HCC): ICD-10-CM

## 2025-07-31 DIAGNOSIS — G43.109 MIGRAINE WITH AURA AND WITHOUT STATUS MIGRAINOSUS, NOT INTRACTABLE: ICD-10-CM

## 2025-07-31 NOTE — TELEPHONE ENCOUNTER
Requested Prescriptions     Pending Prescriptions Disp Refills    QULIPTA 60 MG TABS [Pharmacy Med Name: QULIPTA 60 MG TABS 60 Tablet] 30 tablet 5     Sig: Take 1 tablet by mouth daily       Last Office Visit: 5/13/2025  Next Office Visit: 8/18/2025  Last Medication Refill:7/31/2025    Pt of Dr GREGORY

## 2025-08-01 RX ORDER — ATOGEPANT 60 MG/1
1 TABLET ORAL DAILY
Qty: 30 TABLET | Refills: 5 | Status: SHIPPED | OUTPATIENT
Start: 2025-08-01

## 2025-08-04 RX ORDER — LEVETIRACETAM 500 MG/1
500 TABLET ORAL 2 TIMES DAILY
Qty: 60 TABLET | Refills: 0 | Status: SHIPPED | OUTPATIENT
Start: 2025-08-04

## 2025-08-11 ENCOUNTER — TELEPHONE (OUTPATIENT)
Dept: NEUROLOGY | Age: 30
End: 2025-08-11

## 2025-08-11 ENCOUNTER — PATIENT MESSAGE (OUTPATIENT)
Dept: NEUROLOGY | Age: 30
End: 2025-08-11

## 2025-08-23 DIAGNOSIS — K21.9 GASTROESOPHAGEAL REFLUX DISEASE WITHOUT ESOPHAGITIS: ICD-10-CM

## 2025-08-23 DIAGNOSIS — R51.9 ACUTE NONINTRACTABLE HEADACHE, UNSPECIFIED HEADACHE TYPE: ICD-10-CM

## 2025-08-23 DIAGNOSIS — G44.009 CLUSTER HEADACHE, NOT INTRACTABLE, UNSPECIFIED CHRONICITY PATTERN: ICD-10-CM

## 2025-08-25 RX ORDER — OMEPRAZOLE 40 MG/1
40 CAPSULE, DELAYED RELEASE ORAL DAILY
Qty: 30 CAPSULE | Refills: 5 | Status: SHIPPED | OUTPATIENT
Start: 2025-08-25

## 2025-08-25 RX ORDER — BUTALBITAL, ACETAMINOPHEN AND CAFFEINE 300; 40; 50 MG/1; MG/1; MG/1
CAPSULE ORAL
Qty: 15 CAPSULE | Refills: 1 | Status: SHIPPED | OUTPATIENT
Start: 2025-08-25

## 2025-08-25 RX ORDER — SUMATRIPTAN 6 MG/.5ML
6 INJECTION, SOLUTION SUBCUTANEOUS
Qty: 0.5 ML | Refills: 5 | Status: SHIPPED | OUTPATIENT
Start: 2025-08-25 | End: 2026-11-09

## (undated) DEVICE — FORCEPS BX 240CM 2.4MM L NDL RAD JAW 4 M00513334

## (undated) DEVICE — ENDO KIT,LOURDES HOSPITAL: Brand: MEDLINE INDUSTRIES, INC.